# Patient Record
Sex: MALE | Race: WHITE | NOT HISPANIC OR LATINO | Employment: OTHER | URBAN - METROPOLITAN AREA
[De-identification: names, ages, dates, MRNs, and addresses within clinical notes are randomized per-mention and may not be internally consistent; named-entity substitution may affect disease eponyms.]

---

## 2017-02-15 ENCOUNTER — OFFICE VISIT (OUTPATIENT)
Dept: DERMATOLOGY | Facility: CLINIC | Age: 57
End: 2017-02-15

## 2017-02-15 DIAGNOSIS — L82.1 SK (SEBORRHEIC KERATOSIS): ICD-10-CM

## 2017-02-15 DIAGNOSIS — B35.3 TINEA PEDIS OF BOTH FEET: Primary | ICD-10-CM

## 2017-02-15 DIAGNOSIS — L30.9 DERMATITIS: ICD-10-CM

## 2017-02-15 RX ORDER — FLUOCINONIDE 0.5 MG/G
OINTMENT TOPICAL 2 TIMES DAILY
Qty: 60 G | Refills: 1 | Status: SHIPPED | OUTPATIENT
Start: 2017-02-15 | End: 2017-07-20

## 2017-02-15 RX ORDER — ECONAZOLE NITRATE 10 MG/G
CREAM TOPICAL DAILY
Qty: 85 G | Refills: 1 | Status: SHIPPED | OUTPATIENT
Start: 2017-02-15 | End: 2017-07-20

## 2017-02-15 ASSESSMENT — PAIN SCALES - GENERAL: PAINLEVEL: NO PAIN (0)

## 2017-02-15 NOTE — PATIENT INSTRUCTIONS
-Use Econazole on feet 1x daily for month - sides of feet, bottom, and between toes  -Get Anti-fungal spray for feet to prevent return of fungus  -Start Lidex ointment on hands 2x and self-titrate down as tolerated

## 2017-02-15 NOTE — NURSING NOTE
"Dermatology Rooming Note    Brice Trevino's goals for this visit include:   Chief Complaint   Patient presents with     Derm Problem     Eczema on hands and feet. He is currently using triamcinolone with slight improvement. Adan also has some \"bumps\" on his face of concern.     Nalini Carrion, CMA    "

## 2017-02-15 NOTE — MR AVS SNAPSHOT
After Visit Summary   2/15/2017    Brice Trevino    MRN: [id number]           Patient Information     Date Of Birth          1960        Visit Information        Provider Department      2/15/2017 7:45 AM Ryan Bañuelos MD [facility] Dermatology        Today's Diagnoses     Tinea pedis of both feet    -  1    Dermatitis           Follow-ups after your visit        Your next 10 appointments already scheduled     Mar 01, 2017  7:30 AM CST   (Arrive by 7:15 AM)   NEW COSMETIC with Ryan Bañuelos MD   [facility] Dermatology ([facility])    [address] [place] [address] [phone]              Who to contact     Please call your clinic at [phone] to:    Ask questions about your health    Make or cancel appointments    Discuss your medicines    Learn about your test results    Speak to your doctor   If you have compliments or concerns about an experience at your clinic, or if you wish to file a complaint, please contact [facility] Patient Relations at [phone] or email us at [email or web address]         Additional Information About Your Visit        MyChart Information     [employer] gives you secure access to your electronic health record. If you see a primary care provider, you can also send messages to your care team and make appointments. If you have questions, please call your primary care clinic.  If you do not have a primary care provider, please call [phone] and they will assist you.      [employer] is an electronic gateway that provides easy, online access to your medical records. With [employer], you can request a clinic appointment, read your test results, renew a prescription or communicate with your care team.     To access your existing account, please contact your [facility] or call [phone] for assistance.        Care EveryWhere ID     This is your  Care EveryWhere ID. This could be used by other organizations to access your [place] medical records  [id number]         Blood Pressure from Last 3 Encounters:   10/12/16 98/65   02/09/16 (!) 80/54   02/01/16 100/60    Weight from Last 3 Encounters:   10/12/16 96.5 kg (212 lb 12.8 oz)   02/09/16 90.7 kg (200 lb)   02/01/16 92 kg (202 lb 12.8 oz)              Today, you had the following     No orders found for display         Today's Medication Changes          These changes are accurate as of: 2/15/17  8:36 AM.  If you have any questions, ask your nurse or doctor.               Start taking these medicines.        Dose/Directions    econazole nitrate 1 % cream   Used for:  Tinea pedis of both feet   Started by:  Ryan Bañuelos MD        Apply topically daily For one month to the soles, sides of feet, and between toes.   Quantity:  85 g   Refills:  1       fluocinonide 0.05 % ointment   Commonly known as:  LIDEX   Used for:  Dermatitis   Started by:  Ryan Bañuelos MD        Apply topically 2 times daily   Quantity:  60 g   Refills:  1            Where to get your medicines      These medications were sent to [facility] [address] [place] [address] AT [address] [address]     Phone:  [phone]     econazole nitrate 1 % cream    fluocinonide 0.05 % ointment                Primary Care Provider Office Phone # Fax #    Leo Bhatia [phone]       [address]  [facility] [address]        Thank you!     Thank you for choosing [facility] DERMATOLOGY  for your care. Our goal is always to provide you with excellent care. Hearing back from our patients is one way we can continue to improve our services. Please take a few minutes to complete the written survey that you may receive in the mail after your visit with us. Thank you!             Your Updated Medication List - Protect others around  you: Learn how to safely use, store and throw away your medicines at www.disposemymeds.org.          This list is accurate as of: 2/15/17  8:36 AM.  Always use your most recent med list.                   Brand Name Dispense Instructions for use    ciclopirox 0.77 % Gel     45 g    Externally apply topically 2 times daily       econazole nitrate 1 % cream     85 g    Apply topically daily For one month to the soles, sides of feet, and between toes.       fluocinonide 0.05 % ointment    LIDEX    60 g    Apply topically 2 times daily       rizatriptan 5 MG tablet    MAXALT    18 tablet    Take 1-2 tablets (5-10 mg) by mouth at onset of headache for migraine May repeat in 2 hours. Max 6 tablets/24 hours.       triamcinolone 0.1 % cream    KENALOG    80 g    Apply sparingly to affected area three times daily as needed

## 2017-02-15 NOTE — LETTER
"2/15/2017       RE: Brice Trevino  [address]   [place] [address]     Dear Colleague,    Thank you for referring your patient, Brice Trevino, to the [facility] DERMATOLOGY at [facility]. Please see a copy of my visit note below.    [facility] Dermatology Note      Dermatology Problem List:  1. Tinea Pedis, both feet  2. Dyshidrotic eczema, right hand  3. Seborrheic keratosis  4. Onychomycosis    Encounter Date: Feb 15, 2017    CC:  Chief Complaint   Patient presents with     Derm Problem     Eczema on hands and feet. He is currently using triamcinolone with slight improvement. Adan also has some \"bumps\" on his face of concern.         History of Present Illness:  Mr. Brice Trevino is a 56 year old male who presents as a referral from PCP Dr. Pisano for ongoing dry skin, cracking, and itching of right hand. Has been using TAC cream for several years with mild benefit, would like to gain better control today. Also states his toes can become involved and are intermittently itchy and blister. Would also like to have 3 skin lesions removed from his face.    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    Family History:  There is no family history of skin cancer.    Medications:  Current Outpatient Prescriptions   Medication Sig Dispense Refill     fluocinonide (LIDEX) 0.05 % ointment Apply topically 2 times daily 60 g 1     econazole nitrate 1 % cream Apply topically daily For one month to the soles, sides of feet, and between " toes. 85 g 1     ciclopirox 0.77 % GEL Externally apply topically 2 times daily 45 g 1     triamcinolone (KENALOG) 0.1 % cream Apply sparingly to affected area three times daily as needed 80 g 2     rizatriptan (MAXALT) 5 MG tablet Take 1-2 tablets (5-10 mg) by mouth at onset of headache for migraine May repeat in 2 hours. Max 6 tablets/24 hours. 18 tablet 3     Allergies   Allergen Reactions     No Known Drug Allergies          Review of Systems:  -As per HPI  -Constitutional: The patient denies fatigue, fevers, chills, unintended weight loss, and night sweats.  -HEENT: Patient denies nonhealing oral sores.  -Skin: As above in HPI. No additional skin concerns.    Physical exam:  Vitals: There were no vitals taken for this visit.  GEN: This is a well developed, well-nourished male in no acute distress, in a pleasant mood.    SKIN: Focused examination of the face, hands, and feet was performed.  -white crusts/film between toes consistent with tinea  -hyperkeratosis of right palm with some fissuring, dry skin overlying knuckles of right hand  -seborrheic keratoses x3, forehead, under right eye, under left eye  -No other lesions of concern on areas examined.     Impression/Plan:  1. Tinea pedis:    KOH stain of skin shavings positive for fungus in feet, negative in hands    Econazole 1% cream once daily for 1 month    Recommend anti-fungal spray for future prevention    2. Dyshidrotic dermatitis, Right hand    Begin Lidex ointment BID and self-titrate down as tolerated      3. Seborrheic Keratosis:    Plan for return between 2-4 weeks for treatment likely with cryotherapy for 3+ lesions on face    Benign nature of lesions discussed with patient      CC Dr. Bhatia on close of this encounter.  Follow-up in 2 weeks, earlier for new or changing lesions.     Staff Involved:  Scribed by Dion Kaur, MS4 for Dr. Bañuelos.      Again, thank you for allowing me to participate in the care of your patient.      Sincerely,    Ryan ROTH  MD Sarmad

## 2017-02-15 NOTE — PROGRESS NOTES
"[facility] Dermatology Note      Dermatology Problem List:  1. Tinea Pedis, both feet  2. Dyshidrotic eczema, right hand  3. Seborrheic keratosis  4. Onychomycosis    Encounter Date: Feb 15, 2017    CC:  Chief Complaint   Patient presents with     Derm Problem     Eczema on hands and feet. He is currently using triamcinolone with slight improvement. Adan also has some \"bumps\" on his face of concern.         History of Present Illness:  Mr. Brice Trevino is a 56 year old male who presents as a referral from PCP Dr. Pisano for ongoing dry skin, cracking, and itching of right hand. Has been using TAC cream for several years with mild benefit, would like to gain better control today. Also states his toes can become involved and are intermittently itchy and blister. Would also like to have 3 skin lesions removed from his face.    Past Medical History:   Patient Active Problem List   Diagnosis     CARDIOVASCULAR SCREENING; LDL GOAL LESS THAN 160     Indigestion     Obesity     Migraine     Past Medical History   Diagnosis Date     Dermatitis      Past Surgical History   Procedure Laterality Date     Colonoscopy  4/19/2013     Procedure: COLONOSCOPY;  Colonoscopy;  Surgeon: Yovany Alcaraz MD;  Location: [facility]       Social History:  The patient works in [employer]. The patient denies use of tanning beds.    Family History:  There is no family history of skin cancer.    Medications:  Current Outpatient Prescriptions   Medication Sig Dispense Refill     fluocinonide (LIDEX) 0.05 % ointment Apply topically 2 times daily 60 g 1     econazole nitrate 1 % cream Apply topically daily For one month to the soles, sides of feet, and between toes. 85 g 1     ciclopirox 0.77 % GEL Externally apply topically 2 times daily 45 g 1     triamcinolone (KENALOG) 0.1 % cream Apply sparingly to affected area three times daily as needed 80 g 2     rizatriptan (MAXALT) 5 MG tablet Take 1-2 tablets (5-10 mg) by mouth at onset of " headache for migraine May repeat in 2 hours. Max 6 tablets/24 hours. 18 tablet 3     Allergies   Allergen Reactions     No Known Drug Allergies          Review of Systems:  -As per HPI  -Constitutional: The patient denies fatigue, fevers, chills, unintended weight loss, and night sweats.  -HEENT: Patient denies nonhealing oral sores.  -Skin: As above in HPI. No additional skin concerns.    Physical exam:  Vitals: There were no vitals taken for this visit.  GEN: This is a well developed, well-nourished male in no acute distress, in a pleasant mood.    SKIN: Focused examination of the face, hands, and feet was performed.  -white crusts/film between toes consistent with tinea  -hyperkeratosis of right palm with some fissuring, dry skin overlying knuckles of right hand  -seborrheic keratoses x3, forehead, under right eye, under left eye  -No other lesions of concern on areas examined.     Impression/Plan:  1. Tinea pedis:    KOH stain of skin shavings positive for fungus in feet, negative in hands    Econazole 1% cream once daily for 1 month    Recommend anti-fungal spray for future prevention    2. Dyshidrotic dermatitis, Right hand    Begin Lidex ointment BID and self-titrate down as tolerated      3. Seborrheic Keratosis:    Plan for return between 2-4 weeks for treatment likely with cryotherapy for 3+ lesions on face    Benign nature of lesions discussed with patient    Follow-up in 2 weeks, earlier for new or changing lesions.     Staff Involved:  Scribed by Dion Kaur MS4 for Dr. Bañuelos.      Staff attestation:  The documentation recorded by the scribe accurately reflects the services I personally performed and the decisions I personally made.    Ryan Bañuelos MD  Staff Dermatologist    Department of Dermatology

## 2017-03-01 ENCOUNTER — OFFICE VISIT (OUTPATIENT)
Dept: DERMATOLOGY | Facility: CLINIC | Age: 57
End: 2017-03-01

## 2017-03-01 DIAGNOSIS — L82.1 SK (SEBORRHEIC KERATOSIS): Primary | ICD-10-CM

## 2017-03-01 ASSESSMENT — PAIN SCALES - GENERAL
PAINLEVEL: NO PAIN (0)
PAINLEVEL: MILD PAIN (2)

## 2017-03-01 NOTE — MR AVS SNAPSHOT
After Visit Summary   3/1/2017    Brice Trevino    MRN: [id number]           Patient Information     Date Of Birth          1960        Visit Information        Provider Department      3/1/2017 7:30 AM Ryan Bañuelos MD [facility] Dermatology        Today's Diagnoses     SK (seborrheic keratosis)    -  1      Care Instructions    Cryotherapy    What is it?    Use of a very cold liquid, such as liquid nitrogen, to freeze and destroy abnormal skin cells that need to be removed    What should I expect?    Tenderness and redness    A small blister that might grow and fill with dark purple blood. There may be crusting.    More than one treatment may be needed if the lesions do not go away.    How do I care for the treated area?    Gently wash the area with your hands when bathing.    Use a thin layer of Vaseline to help with healing. You may use a Band-Aid.     The area should heal within 7-10 days and may leave behind a pink or lighter color.     Do not use an antibiotic or Neosporin ointment.     You may take acetaminophen (Tylenol) for pain.     Call your Doctor if you have:    Severe pain    Signs of infection (warmth, redness, cloudy yellow drainage, and or a bad smell)    Questions or concerns    Who should I call with questions?       [facility]: [phone]       [facility]: [phone]       For urgent needs outside of business hours call the [facility] at [phone]        and ask for the dermatology resident on call          Follow-ups after your visit        Follow-up notes from your care team     Return in about 1 year (around 3/1/2018).      Your next 10 appointments already scheduled     Apr 07, 2017  8:00 AM CDT   PHYSICAL with Leo Bhatia MD   [facility] ([facility])    [address] [place] [address] [phone]              Who to contact     Please  call your clinic at [phone] to:    Ask questions about your health    Make or cancel appointments    Discuss your medicines    Learn about your test results    Speak to your doctor   If you have compliments or concerns about an experience at your clinic, or if you wish to file a complaint, please contact [facility] Patient Relations at [phone] or email us at [email or web address]         Additional Information About Your Visit        MyChart Information     [employer] gives you secure access to your electronic health record. If you see a primary care provider, you can also send messages to your care team and make appointments. If you have questions, please call your primary care clinic.  If you do not have a primary care provider, please call [phone] and they will assist you.      [employer] is an electronic gateway that provides easy, online access to your medical records. With [employer], you can request a clinic appointment, read your test results, renew a prescription or communicate with your care team.     To access your existing account, please contact your [facility] or call [phone] for assistance.        Care EveryWhere ID     This is your Care EveryWhere ID. This could be used by other organizations to access your [place] medical records  [id number]         Blood Pressure from Last 3 Encounters:   10/12/16 98/65   02/09/16 (!) 80/54   02/01/16 100/60    Weight from Last 3 Encounters:   10/12/16 96.5 kg (212 lb 12.8 oz)   02/09/16 90.7 kg (200 lb)   02/01/16 92 kg (202 lb 12.8 oz)              We Performed the Following     DESTRUCT BENIGN LESION, UP TO 14        Primary Care Provider Office Phone # Fax #    Leo Bhatia [phone]       [place] [address]  [facility] [address]        Thank you!     Thank you for choosing [facility] DERMATOLOGY  for your care. Our goal is always to provide you  with excellent care. Hearing back from our patients is one way we can continue to improve our services. Please take a few minutes to complete the written survey that you may receive in the mail after your visit with us. Thank you!             Your Updated Medication List - Protect others around you: Learn how to safely use, store and throw away your medicines at www.disposemymeds.org.          This list is accurate as of: 3/1/17 11:59 PM.  Always use your most recent med list.                   Brand Name Dispense Instructions for use    ciclopirox 0.77 % Gel     45 g    Externally apply topically 2 times daily       econazole nitrate 1 % cream     85 g    Apply topically daily For one month to the soles, sides of feet, and between toes.       fluocinonide 0.05 % ointment    LIDEX    60 g    Apply topically 2 times daily       rizatriptan 5 MG tablet    MAXALT    18 tablet    Take 1-2 tablets (5-10 mg) by mouth at onset of headache for migraine May repeat in 2 hours. Max 6 tablets/24 hours.       triamcinolone 0.1 % cream    KENALOG    80 g    Apply sparingly to affected area three times daily as needed

## 2017-03-01 NOTE — PROGRESS NOTES
[facility] Dermatology Note        Dermatology Problem List:  1. Tinea Pedis, both feet  2. Dyshidrotic eczema, right hand  3. Seborrheic keratosis  4. Onychomycosis     Encounter Date: Feb 15, 2017     CC:       Chief Complaint   Patient presents with     Derm Problem       SK removal            History of Present Illness:  Mr. Brice Trevino is a 56 year old male who presents for removal of several SKs on the face. He has one on each lower eyelid, one on the right forehead, and two on the left cheek. They bother him cosmetically but not symptomatically. He got the lidex last weekend and has been using it with improvement of the skin of his hands.      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     Family History:  There is no family history of skin cancer.     Medications:   Current Outpatient Prescriptions           Current Outpatient Prescriptions   Medication Sig Dispense Refill     fluocinonide (LIDEX) 0.05 % ointment Apply topically 2 times daily 60 g 1     econazole nitrate 1 % cream Apply topically daily For one month to the soles, sides of feet, and between toes. 85 g 1     ciclopirox 0.77 % GEL Externally apply topically 2 times daily 45 g 1     triamcinolone (KENALOG) 0.1 % cream Apply sparingly to affected area three times daily as needed 80 g 2     rizatriptan (MAXALT) 5 MG tablet Take 1-2 tablets (5-10 mg) by mouth at onset of headache for migraine May repeat in 2 hours. Max 6 tablets/24  hours. 18 tablet 3              Allergies   Allergen Reactions     No Known Drug Allergies              Review of Systems:  -As per HPI  -Constitutional: The patient denies fatigue, fevers, chills, unintended weight loss, and night sweats.  -HEENT: Patient denies nonhealing oral sores.  -Skin: As above in HPI. No additional skin concerns.     Physical exam:  Vitals: There were no vitals taken for this visit.  GEN: This is a well developed, well-nourished male in no acute distress, in a pleasant mood.    SKIN: Focused examination of the face was performed.  -seborrheic keratoses x5, forehead, under right eye, under left eye, left cheek  -No other lesions of concern on areas examined.   -Right palm with a small fissure at the base of the palm     Impression/Plan:  1. Tinea pedis, per prior note:    KOH stain of skin shavings previously positive for fungus in feet, negative in hands    Econazole 1% cream once daily for 1 month    Recommend anti-fungal spray for future prevention     2. Dyshidrotic dermatitis, Right hand    Continue Lidex ointment BID and self-titrate down as tolerated        3. Seborrheic Keratosis:  Cryotherapy procedure note: After verbal consent and discussion of risks and benefits including but no limited to dyspigmentation/scar, blister, and pain, 5 was(were) treated with 1-2mm freeze border for 2 cycles with liquid nitrogen with forceps. Post cryotherapy instructions were provided.     Benign nature of lesions discussed with patient        CC Dr. Bhatia on close of this encounter.  Follow-up in 1 year, earlier for new or changing lesions.    Staff Physician Comments:   I saw and evaluated the patient with the resident and I agree with the assessment and plan.  I was present for the entire minor procedure and examination. SK treatments were cosmetic and payment was collected at time of visit.    Ryan Bañuelos MD  Dermatology Staff Physician  , Department of Dermatology

## 2017-03-01 NOTE — PATIENT INSTRUCTIONS
Cryotherapy    What is it?    Use of a very cold liquid, such as liquid nitrogen, to freeze and destroy abnormal skin cells that need to be removed    What should I expect?    Tenderness and redness    A small blister that might grow and fill with dark purple blood. There may be crusting.    More than one treatment may be needed if the lesions do not go away.    How do I care for the treated area?    Gently wash the area with your hands when bathing.    Use a thin layer of Vaseline to help with healing. You may use a Band-Aid.     The area should heal within 7-10 days and may leave behind a pink or lighter color.     Do not use an antibiotic or Neosporin ointment.     You may take acetaminophen (Tylenol) for pain.     Call your Doctor if you have:    Severe pain    Signs of infection (warmth, redness, cloudy yellow drainage, and or a bad smell)    Questions or concerns    Who should I call with questions?       [facility]: [phone]       [facility]: [phone]       For urgent needs outside of business hours call the [facility] at [phone]        and ask for the dermatology resident on call

## 2017-03-01 NOTE — NURSING NOTE
Dermatology Rooming Note    Brice Trevino's goals for this visit include:   Chief Complaint   Patient presents with     Derm Problem     SK removal     Nalini Carrion, CMA

## 2017-03-01 NOTE — PROGRESS NOTES
[facility] Dermatology Note      Dermatology Problem List:  1. Tinea Pedis, both feet  2. Dyshidrotic eczema, right hand  3. Seborrheic keratosis  4. Onychomycosis    Encounter Date: Mar 1, 2017    CC:  Chief Complaint   Patient presents with     Derm Problem     SK removal         History of Present Illness:  Mr. Brice Trevino is a 56 year old male who presents for follow-up to treat multiple seborrheic keratoses. He was last seen on 2/15/17 for the above problems. ***       Past Medical History:   Patient Active Problem List   Diagnosis     CARDIOVASCULAR SCREENING; LDL GOAL LESS THAN 160     Indigestion     Obesity     Migraine     Past Medical History   Diagnosis Date     Dermatitis      Past Surgical History   Procedure Laterality Date     Colonoscopy  4/19/2013     Procedure: COLONOSCOPY;  Colonoscopy;  Surgeon: Yovany Alcaraz MD;  Location: [facility]       Social History:  The patient works in [employer]. The patient denies use of tanning beds.    Family History:  There is no family history of skin cancer.    Medications:  Current Outpatient Prescriptions   Medication Sig Dispense Refill     fluocinonide (LIDEX) 0.05 % ointment Apply topically 2 times daily 60 g 1     econazole nitrate 1 % cream Apply topically daily For one month to the soles, sides of feet, and between toes. 85 g 1     rizatriptan (MAXALT) 5 MG tablet Take 1-2 tablets (5-10 mg) by mouth at onset of headache for migraine May repeat in 2 hours. Max 6 tablets/24 hours. 18 tablet 3     ciclopirox 0.77 % GEL Externally apply topically 2 times daily 45 g 1     triamcinolone (KENALOG) 0.1 % cream Apply sparingly to affected area three times daily as needed 80 g 2     Allergies   Allergen Reactions     No Known Drug Allergies          Review of Systems:  -As per HPI  -Skin: As above in HPI. No additional skin concerns.    Physical exam:  Vitals: There were no vitals taken for this visit.  GEN: This is a well developed, well-nourished  male in no acute distress, in a pleasant mood.    SKIN: Focused examination of the face, hands, and feet was performed.  -white crusts/film between toes consistent with tinea  -hyperkeratosis of right palm with some fissuring, dry skin overlying knuckles of right hand  -seborrheic keratoses x3, forehead, under right eye, under left eye***  -No other lesions of concern on areas examined.     Impression/Plan:  1. Seborrheic keratosis of the ***  Cryotherapy procedure note: After verbal consent and discussion of risks and benefits including but no limited to dyspigmentation/scar, blister, and pain, ***was(were) treated with 1-2mm freeze border for 2 cycles with liquid nitrogen. Post cryotherapy instructions were provided.     2. Tinea pedis with positive KOH of feet on 2/15, negative in hands - not addressed today    Econazole 1% cream once daily for 1 month total    Recommend anti-fungal spray for future prevention    3. Dyshidrotic dermatitis, Right hand - not addressed today    Begin Lidex ointment BID and self-titrate down as tolerated      CC Dr. Bahtia on close of this encounter.  Follow-up in 2 weeks, earlier for new or changing lesions.     Staff Involved:  ***      Ryan Bañuelos MD  Dermatology Staff    [facility] Department of Dermatology

## 2017-03-13 ENCOUNTER — MYC MEDICAL ADVICE (OUTPATIENT)
Dept: DERMATOLOGY | Facility: CLINIC | Age: 57
End: 2017-03-13

## 2017-03-22 ENCOUNTER — OFFICE VISIT (OUTPATIENT)
Dept: DERMATOLOGY | Facility: CLINIC | Age: 57
End: 2017-03-22

## 2017-03-22 DIAGNOSIS — L82.1 SK (SEBORRHEIC KERATOSIS): Primary | ICD-10-CM

## 2017-03-22 ASSESSMENT — PAIN SCALES - GENERAL: PAINLEVEL: NO PAIN (0)

## 2017-03-22 NOTE — PATIENT INSTRUCTIONS
Cryotherapy    What is it?    Use of a very cold liquid, such as liquid nitrogen, to freeze and destroy abnormal skin cells that need to be removed    What should I expect?    Tenderness and redness    A small blister that might grow and fill with dark purple blood. There may be crusting.    More than one treatment may be needed if the lesions do not go away.    How do I care for the treated area?    Gently wash the area with your hands when bathing.    Use a thin layer of Vaseline to help with healing. You may use a Band-Aid.     The area should heal within 7-10 days and may leave behind a pink or lighter color.     Do not use an antibiotic or Neosporin ointment.     You may take acetaminophen (Tylenol) for pain.     Call your Doctor if you have:    Severe pain    Signs of infection (warmth, redness, cloudy yellow drainage, and or a bad smell)    Questions or concerns    Who should I call with questions?       [facility]: [phone]       [facility]: [phone]       For urgent needs outside of business hours call the [facility] at [phone]        and ask for the dermatology resident on call

## 2017-03-22 NOTE — NURSING NOTE
Dermatology Rooming Note    Brice Trevino's goals for this visit include:   Chief Complaint   Patient presents with     Derm Problem     chuck is here today to have some LN2 done     Kate Donohue MA

## 2017-03-22 NOTE — LETTER
3/22/2017       RE: Brice Trevino  [address]   [place] [address]     Dear Colleague,    Thank you for referring your patient, Brice Trevino, to the [facility] DERMATOLOGY at [facility]. Please see a copy of my visit note below.    HPI:  Mr Trevino is here for a persistent seborrheic keratoses on the eyelids. He is here today for another treatment of cryotherapy for the persistent lesions. He states that the other lesions have healed nicely after cryotherapy. The patient reports no other lesions of concern at this time.    Exam: Focused examination of the face was performed.   - Persistent stuck on tan to brown papules on the inferior eyelids bilaterally.   - Resolution other treated papule with postinflammatory pink macules    Impression/Plan  1. Persistent seborrheic keratoses - inferior eyelids bilaterally.   - Cryotherapy procedure note: After verbal consent and discussion of risks and benefits including but no limited to dyspigmentation/scar, blister, and pain, 2 was(were) treated with 1-2mm freeze border for 2 cycles with liquid nitrogen. Post cryotherapy instructions were provided.     Follow up 1 year.     Staff Involved:  Scribe Disclosure:   I, Edgar Katz, am serving as a scribe to document services personally performed by Dr. Ryan Bañuelos, based on data collection and the provider's statements to me.     Staff attestation:  The documentation recorded by the scribe accurately reflects the services I personally performed and the decisions I personally made.    Ryan Bañuelos MD  Staff Dermatologist    Department of Dermatology    Visit performed free of charge (touch-up).

## 2017-03-22 NOTE — MR AVS SNAPSHOT
After Visit Summary   3/22/2017    Brice Trevino    MRN: [id number]           Patient Information     Date Of Birth          1960        Visit Information        Provider Department      3/22/2017 2:00 PM Ryan Bañuelos MD [facility] Dermatology        Today's Diagnoses     SK (seborrheic keratosis)    -  1      Care Instructions    Cryotherapy    What is it?    Use of a very cold liquid, such as liquid nitrogen, to freeze and destroy abnormal skin cells that need to be removed    What should I expect?    Tenderness and redness    A small blister that might grow and fill with dark purple blood. There may be crusting.    More than one treatment may be needed if the lesions do not go away.    How do I care for the treated area?    Gently wash the area with your hands when bathing.    Use a thin layer of Vaseline to help with healing. You may use a Band-Aid.     The area should heal within 7-10 days and may leave behind a pink or lighter color.     Do not use an antibiotic or Neosporin ointment.     You may take acetaminophen (Tylenol) for pain.     Call your Doctor if you have:    Severe pain    Signs of infection (warmth, redness, cloudy yellow drainage, and or a bad smell)    Questions or concerns    Who should I call with questions?       [facility]: [phone]       [facility]: [phone]       For urgent needs outside of business hours call the [facility] at [phone]        and ask for the dermatology resident on call          Follow-ups after your visit        Follow-up notes from your care team     Return in about 1 year (around 3/22/2018).      Who to contact     Please call your clinic at [phone] to:    Ask questions about your health    Make or cancel appointments    Discuss your medicines    Learn about your test results    Speak to your doctor   If you have compliments or concerns about an experience at  your clinic, or if you wish to file a complaint, please contact [facility] Patient Relations at [phone] or email us at [email or web address]         Additional Information About Your Visit        MyChart Information     [employer] gives you secure access to your electronic health record. If you see a primary care provider, you can also send messages to your care team and make appointments. If you have questions, please call your primary care clinic.  If you do not have a primary care provider, please call [phone] and they will assist you.      [employer] is an electronic gateway that provides easy, online access to your medical records. With [employer], you can request a clinic appointment, read your test results, renew a prescription or communicate with your care team.     To access your existing account, please contact your [facility] or call [phone] for assistance.        Care EveryWhere ID     This is your Care EveryWhere ID. This could be used by other organizations to access your [facility] medical records  [id number]         Blood Pressure from Last 3 Encounters:   04/07/17 97/62   10/12/16 98/65   02/09/16 (!) 80/54    Weight from Last 3 Encounters:   04/07/17 89.8 kg (198 lb)   10/12/16 96.5 kg (212 lb 12.8 oz)   02/09/16 90.7 kg (200 lb)              Today, you had the following     No orders found for display       Primary Care Provider Office Phone # Fax #    Leo Bhatia [phone]       [address]  [facility] [address]        Thank you!     Thank you for choosing [facility] DERMATOLOGY  for your care. Our goal is always to provide you with excellent care. Hearing back from our patients is one way we can continue to improve our services. Please take a few minutes to complete the written survey that you may receive in the mail after your visit with us. Thank you!             Your Updated  Medication List - Protect others around you: Learn how to safely use, store and throw away your medicines at www.disposemymeds.org.          This list is accurate as of: 3/22/17 11:59 PM.  Always use your most recent med list.                   Brand Name Dispense Instructions for use    ciclopirox 0.77 % Gel     45 g    Externally apply topically 2 times daily       econazole nitrate 1 % cream     85 g    Apply topically daily For one month to the soles, sides of feet, and between toes.       fluocinonide 0.05 % ointment    LIDEX    60 g    Apply topically 2 times daily       rizatriptan 5 MG tablet    MAXALT    18 tablet    Take 1-2 tablets (5-10 mg) by mouth at onset of headache for migraine May repeat in 2 hours. Max 6 tablets/24 hours.       triamcinolone 0.1 % cream    KENALOG    80 g    Apply sparingly to affected area three times daily as needed

## 2017-03-22 NOTE — PROGRESS NOTES
HPI:  Mr Trevino is here for a persistent seborrheic keratoses on the eyelids. He is here today for another treatment of cryotherapy for the persistent lesions. He states that the other lesions have healed nicely after cryotherapy. The patient reports no other lesions of concern at this time.    Exam: Focused examination of the face was performed.   - Persistent stuck on tan to brown papules on the inferior eyelids bilaterally.   - Resolution other treated papule with postinflammatory pink macules    Impression/Plan  1. Persistent seborrheic keratoses - inferior eyelids bilaterally.   - Cryotherapy procedure note: After verbal consent and discussion of risks and benefits including but no limited to dyspigmentation/scar, blister, and pain, 2 was(were) treated with 1-2mm freeze border for 2 cycles with liquid nitrogen. Post cryotherapy instructions were provided.     Follow up 1 year.     Staff Involved:  Scribe Disclosure:   I, Edgar Katz, am serving as a scribe to document services personally performed by Dr. Ryan Bañuelos, based on data collection and the provider's statements to me.     Staff attestation:  The documentation recorded by the scribe accurately reflects the services I personally performed and the decisions I personally made.    Ryan Bañuelos MD  Staff Dermatologist    Department of Dermatology    Visit performed free of charge (touch-up).

## 2017-03-24 ENCOUNTER — TRANSFERRED RECORDS (OUTPATIENT)
Dept: HEALTH INFORMATION MANAGEMENT | Facility: CLINIC | Age: 57
End: 2017-03-24

## 2017-04-07 ENCOUNTER — OFFICE VISIT (OUTPATIENT)
Dept: FAMILY MEDICINE | Facility: CLINIC | Age: 57
End: 2017-04-07
Payer: COMMERCIAL

## 2017-04-07 VITALS
TEMPERATURE: 98.8 F | HEART RATE: 84 BPM | HEIGHT: 68 IN | WEIGHT: 198 LBS | DIASTOLIC BLOOD PRESSURE: 62 MMHG | OXYGEN SATURATION: 99 % | BODY MASS INDEX: 30.01 KG/M2 | RESPIRATION RATE: 16 BRPM | SYSTOLIC BLOOD PRESSURE: 97 MMHG

## 2017-04-07 DIAGNOSIS — Z00.01 ENCOUNTER FOR ROUTINE ADULT MEDICAL EXAM WITH ABNORMAL FINDINGS: Primary | ICD-10-CM

## 2017-04-07 DIAGNOSIS — Z13.220 LIPID SCREENING: ICD-10-CM

## 2017-04-07 DIAGNOSIS — R73.9 ELEVATED BLOOD SUGAR: ICD-10-CM

## 2017-04-07 LAB
CHOLEST SERPL-MCNC: 202 MG/DL
HBA1C MFR BLD: 6.2 % (ref 4.3–6)
HDLC SERPL-MCNC: 42 MG/DL
LDLC SERPL CALC-MCNC: 128 MG/DL
NONHDLC SERPL-MCNC: 160 MG/DL
TRIGL SERPL-MCNC: 159 MG/DL

## 2017-04-07 PROCEDURE — 83036 HEMOGLOBIN GLYCOSYLATED A1C: CPT | Performed by: FAMILY MEDICINE

## 2017-04-07 PROCEDURE — 99396 PREV VISIT EST AGE 40-64: CPT | Performed by: FAMILY MEDICINE

## 2017-04-07 PROCEDURE — 36415 COLL VENOUS BLD VENIPUNCTURE: CPT | Performed by: FAMILY MEDICINE

## 2017-04-07 PROCEDURE — 80061 LIPID PANEL: CPT | Performed by: FAMILY MEDICINE
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
## 2017-04-07 NOTE — MR AVS SNAPSHOT
After Visit Summary   4/7/2017    Brice Trevino    MRN: [id number]           Patient Information     Date Of Birth          1960        Visit Information        Provider Department      4/7/2017 8:00 AM Leo Bhatia MD [facility]        Today's Diagnoses     Encounter for routine adult medical exam with abnormal findings    -  1    Elevated blood sugar        Lipid screening          Care Instructions      Preventive Health Recommendations  Male Ages 50 - 64    Yearly exam:             See your health care provider every year in order to  o   Review health changes.   o   Discuss preventive care.    o   Review your medicines if your doctor has prescribed any.     Have a cholesterol test every 5 years, or more frequently if you are at risk for high cholesterol/heart disease.     Have a diabetes test (fasting glucose) every three years. If you are at risk for diabetes, you should have this test more often.     Have a colonoscopy at age 50, or have a yearly FIT test (stool test). These exams will check for colon cancer.      Talk with your health care provider about whether or not a prostate cancer screening test (PSA) is right for you.    You should be tested each year for STDs (sexually transmitted diseases), if you re at risk.     Shots: Get a flu shot each year. Get a tetanus shot every 10 years.     Nutrition:    Eat at least 5 servings of fruits and vegetables daily.     Eat whole-grain bread, whole-wheat pasta and brown rice instead of white grains and rice.     Talk to your provider about Calcium and Vitamin D.     Lifestyle    Exercise for at least 150 minutes a week (30 minutes a day, 5 days a week). This will help you control your weight and prevent disease.     Limit alcohol to one drink per day.     No smoking.     Wear sunscreen to prevent skin cancer.     See your dentist every six months for an exam and cleaning.     See your eye doctor every 1 to 2 years.        "   Follow-ups after your visit        Who to contact     If you have questions or need follow up information about today's clinic visit or your schedule please contact [facility] directly at [phone].  Normal or non-critical lab and imaging results will be communicated to you by MyChart, letter or phone within 4 business days after the clinic has received the results. If you do not hear from us within 7 days, please contact the clinic through MyChart or phone. If you have a critical or abnormal lab result, we will notify you by phone as soon as possible.  Submit refill requests through [employer] or call your pharmacy and they will forward the refill request to us. Please allow 3 business days for your refill to be completed.          Additional Information About Your Visit        [email or web address] Information     [employer] gives you secure access to your electronic health record. If you see a primary care provider, you can also send messages to your care team and make appointments. If you have questions, please call your primary care clinic.  If you do not have a primary care provider, please call [phone] and they will assist you.        Care EveryWhere ID     This is your Care EveryWhere ID. This could be used by other organizations to access your [facility] medical records  [id number]        Your Vitals Were     Pulse Temperature Respirations Height Pulse Oximetry BMI (Body Mass Index)    84 98.8  F (37.1  C) (Oral) 16 5' 8\" (1.727 m) 99% 30.11 kg/m2       Blood Pressure from Last 3 Encounters:   04/07/17 (!) 64/57   10/12/16 98/65   02/09/16 (!) 80/54    Weight from Last 3 Encounters:   04/07/17 198 lb (89.8 kg)   10/12/16 212 lb 12.8 oz (96.5 kg)   02/09/16 200 lb (90.7 kg)              We Performed the Following     Hemoglobin A1c     Lipid panel reflex to direct LDL        Primary Care Provider Office Phone # Fax #    Leo Bhatia [phone]       [facility] " [address]  [facility] [address]        Thank you!     Thank you for choosing [facility]  for your care. Our goal is always to provide you with excellent care. Hearing back from our patients is one way we can continue to improve our services. Please take a few minutes to complete the written survey that you may receive in the mail after your visit with us. Thank you!             Your Updated Medication List - Protect others around you: Learn how to safely use, store and throw away your medicines at www.disposemymeds.org.          This list is accurate as of: 4/7/17  8:42 AM.  Always use your most recent med list.                   Brand Name Dispense Instructions for use    ciclopirox 0.77 % Gel     45 g    Externally apply topically 2 times daily       econazole nitrate 1 % cream     85 g    Apply topically daily For one month to the soles, sides of feet, and between toes.       fluocinonide 0.05 % ointment    LIDEX    60 g    Apply topically 2 times daily       rizatriptan 5 MG tablet    MAXALT    18 tablet    Take 1-2 tablets (5-10 mg) by mouth at onset of headache for migraine May repeat in 2 hours. Max 6 tablets/24 hours.       triamcinolone 0.1 % cream    KENALOG    80 g    Apply sparingly to affected area three times daily as needed

## 2017-04-07 NOTE — NURSING NOTE
"Chief Complaint   Patient presents with     Physical       Initial BP (!) 64/57 (BP Location: Right arm, Patient Position: Chair, Cuff Size: Adult Large)  Pulse 84  Temp 98.8  F (37.1  C) (Oral)  Resp 16  Ht 5' 8\" (1.727 m)  Wt 198 lb (89.8 kg)  SpO2 99%  BMI 30.11 kg/m2 Estimated body mass index is 30.11 kg/(m^2) as calculated from the following:    Height as of this encounter: 5' 8\" (1.727 m).    Weight as of this encounter: 198 lb (89.8 kg).  Medication Reconciliation: complete     Sara Sanchez CMA      "

## 2017-07-20 DIAGNOSIS — B35.3 TINEA PEDIS OF BOTH FEET: ICD-10-CM

## 2017-07-20 DIAGNOSIS — L30.9 DERMATITIS: ICD-10-CM

## 2017-07-21 RX ORDER — FLUOCINONIDE 0.5 MG/G
OINTMENT TOPICAL 2 TIMES DAILY
Qty: 60 G | Refills: 1 | Status: SHIPPED | OUTPATIENT
Start: 2017-07-21 | End: 2018-05-04

## 2017-07-21 RX ORDER — ECONAZOLE NITRATE 10 MG/G
CREAM TOPICAL DAILY
Qty: 85 G | Refills: 1 | Status: SHIPPED | OUTPATIENT
Start: 2017-07-21 | End: 2018-05-04

## 2017-07-21 NOTE — TELEPHONE ENCOUNTER
Refill request received for lidex ointment and econazole 1%. Last note from 3/17 with Dr. Bañuelos reviewed. Initially planned for 1 month of econazole, though if recurrent, additional treatment topically seems reasonable. Will prescribe both as requested.     Prasanth Thomas MD  PGY3 Dermatology  [phone]

## 2017-11-07 ENCOUNTER — OFFICE VISIT (OUTPATIENT)
Dept: FAMILY MEDICINE | Facility: CLINIC | Age: 57
End: 2017-11-07
Payer: COMMERCIAL

## 2017-11-07 VITALS
RESPIRATION RATE: 14 BRPM | SYSTOLIC BLOOD PRESSURE: 102 MMHG | WEIGHT: 199.4 LBS | TEMPERATURE: 98.9 F | DIASTOLIC BLOOD PRESSURE: 66 MMHG | HEART RATE: 80 BPM | BODY MASS INDEX: 30.32 KG/M2 | OXYGEN SATURATION: 95 %

## 2017-11-07 DIAGNOSIS — Z23 NEED FOR PROPHYLACTIC VACCINATION AND INOCULATION AGAINST INFLUENZA: ICD-10-CM

## 2017-11-07 DIAGNOSIS — J20.9 ACUTE BRONCHITIS WITH SYMPTOMS > 10 DAYS: Primary | ICD-10-CM

## 2017-11-07 PROCEDURE — 90471 IMMUNIZATION ADMIN: CPT | Performed by: FAMILY MEDICINE

## 2017-11-07 PROCEDURE — 90686 IIV4 VACC NO PRSV 0.5 ML IM: CPT | Performed by: FAMILY MEDICINE

## 2017-11-07 PROCEDURE — 99213 OFFICE O/P EST LOW 20 MIN: CPT | Mod: 25 | Performed by: FAMILY MEDICINE

## 2017-11-07 RX ORDER — AZITHROMYCIN 250 MG/1
TABLET, FILM COATED ORAL
Qty: 6 TABLET | Refills: 0 | Status: SHIPPED | OUTPATIENT
Start: 2017-11-07 | End: 2018-05-04

## 2017-11-07 RX ORDER — CODEINE PHOSPHATE/GUAIFENESIN 10-100MG/5
5 LIQUID (ML) ORAL EVERY 4 HOURS PRN
COMMUNITY
Start: 2017-03-24 | End: 2018-05-04

## 2017-11-07 NOTE — PROGRESS NOTES
SUBJECTIVE:   Brice Trevino is a 57 year old male who presents to clinic today for the following health issues:    RESPIRATORY SYMPTOMS      Duration: one week    Description  cough and fever    Severity: moderate    Accompanying signs and symptoms: Fever and cough    History (predisposing factors):  none    Precipitating or alleviating factors: None    Therapies tried and outcome:  Guaifenesin for cough.    Worsening symptoms in past 7-10 days.  Pharmacist recommended he be seen today with worsening cough and fever last week.  No fever today.  No known sick contacts.  Unable to take deep breath without cough.  Productive cough- yellow sputum.  Nonsmoker.    Problem list and histories reviewed & adjusted, as indicated.  Additional history: as documented    Patient Active Problem List   Diagnosis     Indigestion     Obesity     Migraine     Past Surgical History:   Procedure Laterality Date     COLONOSCOPY  4/19/2013    Procedure: COLONOSCOPY;  Colonoscopy;  Surgeon: Yovany Alcaraz MD;  Location:  GI       Social History   Substance Use Topics     Smoking status: Never Smoker     Smokeless tobacco: Never Used     Alcohol use No     Family History   Problem Relation Age of Onset     DIABETES Father      type 2     GASTROINTESTINAL DISEASE Mother      reflux     Eye Disorder Maternal Grandfather      cateracts     HEART DISEASE Paternal Grandfather      heart attack age 60's     Melanoma No family hx of      Skin Cancer No family hx of          Current Outpatient Prescriptions   Medication Sig Dispense Refill     guaiFENesin-codeine (GUAIFENESIN AC) 100-10 MG/5ML SYRP syrup Take 5 mLs by mouth every 4 hours as needed       azithromycin (ZITHROMAX) 250 MG tablet Two tablets first day, then one tablet daily for four days. 6 tablet 0     econazole nitrate 1 % cream Apply topically daily For one month to the soles, sides of feet, and between toes. (Patient not taking: Reported on 11/7/2017) 85 g 1     fluocinonide  (LIDEX) 0.05 % ointment Apply topically 2 times daily (Patient not taking: Reported on 11/7/2017) 60 g 1     rizatriptan (MAXALT) 5 MG tablet Take 1-2 tablets (5-10 mg) by mouth at onset of headache for migraine May repeat in 2 hours. Max 6 tablets/24 hours. (Patient not taking: Reported on 11/7/2017) 18 tablet 3     ciclopirox 0.77 % GEL Externally apply topically 2 times daily (Patient not taking: Reported on 11/7/2017) 45 g 1     triamcinolone (KENALOG) 0.1 % cream Apply sparingly to affected area three times daily as needed (Patient not taking: Reported on 11/7/2017) 80 g 2     Allergies   Allergen Reactions     No Known Drug Allergies      BP Readings from Last 3 Encounters:   11/07/17 102/66   04/07/17 97/62   10/12/16 98/65    Wt Readings from Last 3 Encounters:   11/07/17 199 lb 6.4 oz (90.4 kg)   04/07/17 198 lb (89.8 kg)   10/12/16 212 lb 12.8 oz (96.5 kg)        Social History   Nonsmoker.  Works [employer].    Reviewed and updated as needed this visit by clinical staff     Reviewed and updated as needed this visit by Provider         ROS:  Constitutional, HEENT, cardiovascular, pulmonary, gi and gu systems are negative, except as otherwise noted.      OBJECTIVE:   /66 (BP Location: Right arm, Patient Position: Sitting, Cuff Size: Adult Regular)  Pulse 80  Temp 98.9  F (37.2  C) (Oral)  Resp 14  Wt 199 lb 6.4 oz (90.4 kg)  SpO2 95%  BMI 30.32 kg/m2  Body mass index is 30.32 kg/(m^2).     GENERAL: healthy, alert and no distress  NECK: no adenopathy, no asymmetry, masses, or scars and thyroid normal to palpation  RESP: lungs clear to auscultation - no rales, rhonchi or wheezes.  Unable to take deep breath in without cough.  Harsh, productive cough on exam.  CV: regular rate and rhythm, normal S1 S2, no S3 or S4, no murmur, click or rub, no peripheral edema and peripheral pulses strong  ABDOMEN: soft, nontender, no hepatosplenomegaly, no masses and bowel sounds normal  MS: no gross musculoskeletal  defects noted, no edema  SKIN: no suspicious lesions or rashes  PSYCH: mentation appears normal, affect normal/bright    Diagnostic Test Results:  none     ASSESSMENT/PLAN:       1. Acute bronchitis with symptoms > 10 days    Prescribed azithromycin (ZITHROMAX) 250 MG tablet; Two tablets first day, then one tablet daily for four days.  Dispense: 6 tablet; Refill: 0    FU if no change or worsening symptoms prn.    2. Need for prophylactic vaccination and inoculation against influenza    - HC FLU VAC PRESRV FREE QUAD SPLIT VIR 3+YRS IM  [12110]  -      ADMIN VACCINE, FIRST [41501]    Flu vaccine today.    Keyona Whiting MD  [facility]  Injectable Influenza Immunization Documentation    1.  Is the person to be vaccinated sick today?   No    2. Does the person to be vaccinated have an allergy to a component   of the vaccine?   No  Egg Allergy Algorithm Link    3. Has the person to be vaccinated ever had a serious reaction   to influenza vaccine in the past?   No    4. Has the person to be vaccinated ever had Guillain-Barré syndrome?   No    Form completed by Sarah Longoria MA

## 2017-11-07 NOTE — MR AVS SNAPSHOT
After Visit Summary   11/7/2017    Brice Trevino    MRN: [id number]           Patient Information     Date Of Birth          1960        Visit Information        Provider Department      11/7/2017 8:00 AM Keyona Whiting MD [facility]        Today's Diagnoses     Acute bronchitis with symptoms > 10 days    -  1    Need for prophylactic vaccination and inoculation against influenza           Follow-ups after your visit        Follow-up notes from your care team     Return if symptoms worsen or fail to improve.      Who to contact     If you have questions or need follow up information about today's clinic visit or your schedule please contact [facility] directly at [phone].  Normal or non-critical lab and imaging results will be communicated to you by Edventoryhart, letter or phone within 4 business days after the clinic has received the results. If you do not hear from us within 7 days, please contact the clinic through Edventoryhart or phone. If you have a critical or abnormal lab result, we will notify you by phone as soon as possible.  Submit refill requests through Crosswise or call your pharmacy and they will forward the refill request to us. Please allow 3 business days for your refill to be completed.          Additional Information About Your Visit        MyChart Information     Crosswise gives you secure access to your electronic health record. If you see a primary care provider, you can also send messages to your care team and make appointments. If you have questions, please call your primary care clinic.  If you do not have a primary care provider, please call [phone] and they will assist you.        Care EveryWhere ID     This is your Care EveryWhere ID. This could be used by other organizations to access your [facility] medical records  [id number]        Your Vitals Were     Pulse Temperature Respirations Pulse Oximetry BMI (Body  Mass Index)       80 98.9  F (37.2  C) (Oral) 14 95% 30.32 kg/m2        Blood Pressure from Last 3 Encounters:   11/07/17 102/66   04/07/17 97/62   10/12/16 98/65    Weight from Last 3 Encounters:   11/07/17 199 lb 6.4 oz (90.4 kg)   04/07/17 198 lb (89.8 kg)   10/12/16 212 lb 12.8 oz (96.5 kg)              We Performed the Following          ADMIN VACCINE, FIRST [96866]     HC FLU VAC PRESRV FREE QUAD SPLIT VIR 3+YRS IM  [30107]          Today's Medication Changes          These changes are accurate as of: 11/7/17  8:49 AM.  If you have any questions, ask your nurse or doctor.               Start taking these medicines.        Dose/Directions    azithromycin 250 MG tablet   Commonly known as:  ZITHROMAX   Used for:  Acute bronchitis with symptoms > 10 days   Started by:  Keyona Whiting MD        Two tablets first day, then one tablet daily for four days.   Quantity:  6 tablet   Refills:  0            Where to get your medicines      These medications were sent to [facility] - [place], [place] - [address]  [address], [place] [address]     Phone:  [phone]     azithromycin 250 MG tablet                Primary Care Provider Office Phone # Fax #    Leo Grady Bhatia [phone]       [address]  [facility] [address]        Equal Access to Services     KATHERINE JOHNSON AH: Hadii aad ku hadasho Soomaali, waaxda luqadaha, qaybta kaalmada adeegyada, waxay marika treadwell adestanley miller. So [facility] [phone].    ATENCIÓN: Si habla español, tiene a lazo disposición servicios gratuitos de asistencia lingüística. Llame al [phone].    We comply with applicable federal civil rights laws and [place] laws. We do not discriminate on the basis of race, color, national origin, age, disability, sex, sexual orientation, or gender identity.            Thank you!     Thank you for choosing [facility]  for your care. Our goal is always to  provide you with excellent care. Hearing back from our patients is one way we can continue to improve our services. Please take a few minutes to complete the written survey that you may receive in the mail after your visit with us. Thank you!             Your Updated Medication List - Protect others around you: Learn how to safely use, store and throw away your medicines at www.disposemymeds.org.          This list is accurate as of: 11/7/17  8:49 AM.  Always use your most recent med list.                   Brand Name Dispense Instructions for use Diagnosis    azithromycin 250 MG tablet    ZITHROMAX    6 tablet    Two tablets first day, then one tablet daily for four days.    Acute bronchitis with symptoms > 10 days       ciclopirox 0.77 % Gel     45 g    Externally apply topically 2 times daily    Onychomycosis       econazole nitrate 1 % cream     85 g    Apply topically daily For one month to the soles, sides of feet, and between toes.    Tinea pedis of both feet       fluocinonide 0.05 % ointment    LIDEX    60 g    Apply topically 2 times daily    Dermatitis       guaiFENesin-codeine 100-10 MG/5ML Syrp syrup    guaiFENesin AC     Take 5 mLs by mouth every 4 hours as needed        rizatriptan 5 MG tablet    MAXALT    18 tablet    Take 1-2 tablets (5-10 mg) by mouth at onset of headache for migraine May repeat in 2 hours. Max 6 tablets/24 hours.    Migraine with aura and without status migrainosus, not intractable       triamcinolone 0.1 % cream    KENALOG    80 g    Apply sparingly to affected area three times daily as needed    Eczema, unspecified type

## 2017-11-07 NOTE — NURSING NOTE
"Chief Complaint   Patient presents with     URI     cough and congested        Initial /66 (BP Location: Right arm, Patient Position: Sitting, Cuff Size: Adult Regular)  Pulse 80  Temp 98.9  F (37.2  C) (Oral)  Resp 14  Wt 199 lb 6.4 oz (90.4 kg)  SpO2 95%  BMI 30.32 kg/m2 Estimated body mass index is 30.32 kg/(m^2) as calculated from the following:    Height as of 4/7/17: 5' 8\" (1.727 m).    Weight as of this encounter: 199 lb 6.4 oz (90.4 kg).  Medication Reconciliation: complete     Sarah Longoria MA      "

## 2018-05-04 ENCOUNTER — OFFICE VISIT (OUTPATIENT)
Dept: FAMILY MEDICINE | Facility: CLINIC | Age: 58
End: 2018-05-04
Payer: COMMERCIAL

## 2018-05-04 VITALS
HEART RATE: 71 BPM | RESPIRATION RATE: 16 BRPM | HEIGHT: 68 IN | SYSTOLIC BLOOD PRESSURE: 101 MMHG | DIASTOLIC BLOOD PRESSURE: 68 MMHG | TEMPERATURE: 97.9 F | WEIGHT: 200.6 LBS | BODY MASS INDEX: 30.4 KG/M2 | OXYGEN SATURATION: 96 %

## 2018-05-04 DIAGNOSIS — L30.9 DERMATITIS: ICD-10-CM

## 2018-05-04 DIAGNOSIS — M79.675 PAIN OF TOE OF LEFT FOOT: ICD-10-CM

## 2018-05-04 DIAGNOSIS — Z12.5 SPECIAL SCREENING FOR MALIGNANT NEOPLASM OF PROSTATE: ICD-10-CM

## 2018-05-04 DIAGNOSIS — Z00.00 ROUTINE GENERAL MEDICAL EXAMINATION AT A HEALTH CARE FACILITY: Primary | ICD-10-CM

## 2018-05-04 DIAGNOSIS — E66.9 OBESITY WITHOUT SERIOUS COMORBIDITY, UNSPECIFIED CLASSIFICATION, UNSPECIFIED OBESITY TYPE: ICD-10-CM

## 2018-05-04 LAB
ALBUMIN SERPL-MCNC: 4.2 G/DL (ref 3.4–5)
ALP SERPL-CCNC: 71 U/L (ref 40–150)
ALT SERPL W P-5'-P-CCNC: 39 U/L (ref 0–70)
ANION GAP SERPL CALCULATED.3IONS-SCNC: 2 MMOL/L (ref 3–14)
AST SERPL W P-5'-P-CCNC: 18 U/L (ref 0–45)
BILIRUB SERPL-MCNC: 0.5 MG/DL (ref 0.2–1.3)
BUN SERPL-MCNC: 11 MG/DL (ref 7–30)
CALCIUM SERPL-MCNC: 9.4 MG/DL (ref 8.5–10.1)
CHLORIDE SERPL-SCNC: 106 MMOL/L (ref 94–109)
CHOLEST SERPL-MCNC: 221 MG/DL
CO2 SERPL-SCNC: 31 MMOL/L (ref 20–32)
CREAT SERPL-MCNC: 0.93 MG/DL (ref 0.66–1.25)
GFR SERPL CREATININE-BSD FRML MDRD: 84 ML/MIN/1.7M2
GLUCOSE SERPL-MCNC: 108 MG/DL (ref 70–99)
HBA1C MFR BLD: 5.9 % (ref 0–5.6)
HDLC SERPL-MCNC: 43 MG/DL
LDLC SERPL CALC-MCNC: 156 MG/DL
NONHDLC SERPL-MCNC: 178 MG/DL
POTASSIUM SERPL-SCNC: 4.4 MMOL/L (ref 3.4–5.3)
PROT SERPL-MCNC: 7.3 G/DL (ref 6.8–8.8)
PSA SERPL-ACNC: 3.57 UG/L (ref 0–4)
SODIUM SERPL-SCNC: 139 MMOL/L (ref 133–144)
TRIGL SERPL-MCNC: 110 MG/DL

## 2018-05-04 PROCEDURE — 80061 LIPID PANEL: CPT | Performed by: FAMILY MEDICINE

## 2018-05-04 PROCEDURE — 80053 COMPREHEN METABOLIC PANEL: CPT | Performed by: FAMILY MEDICINE

## 2018-05-04 PROCEDURE — 99396 PREV VISIT EST AGE 40-64: CPT | Performed by: FAMILY MEDICINE

## 2018-05-04 PROCEDURE — 36415 COLL VENOUS BLD VENIPUNCTURE: CPT | Performed by: FAMILY MEDICINE

## 2018-05-04 PROCEDURE — 83036 HEMOGLOBIN GLYCOSYLATED A1C: CPT | Performed by: FAMILY MEDICINE

## 2018-05-04 PROCEDURE — G0103 PSA SCREENING: HCPCS | Performed by: FAMILY MEDICINE

## 2018-05-04 RX ORDER — FLUOCINONIDE 0.5 MG/G
OINTMENT TOPICAL 2 TIMES DAILY
Qty: 60 G | Refills: 1 | Status: SHIPPED | OUTPATIENT
Start: 2018-05-04 | End: 2018-11-09

## 2018-05-04 NOTE — PROGRESS NOTES
SUBJECTIVE:   CC: Brice Trevino is an 57 year old male who presents for preventative health visit.     Physical   Annual:     Getting at least 3 servings of Calcium per day::  NO    Bi-annual eye exam::  Yes    Dental care twice a year::  Yes    Sleep apnea or symptoms of sleep apnea::  Daytime drowsiness    Diet::  Low salt, Low fat/cholesterol and Carbohydrate counting    Frequency of exercise::  2-3 days/week    Duration of exercise::  Less than 15 minutes    Taking medications regularly::  Yes    Medication side effects::  None    Additional concerns today::  No              Today's PHQ-2 Score:   PHQ-2 ( 1999 Pfizer) 11/7/2017   Q1: Little interest or pleasure in doing things 0   Q2: Feeling down, depressed or hopeless 0   PHQ-2 Score 0   Q1: Little interest or pleasure in doing things -   Q2: Feeling down, depressed or hopeless -   PHQ-2 Score -       Abuse: Current or Past(Physical, Sexual or Emotional)- No  Do you feel safe in your environment - Yes    Social History   Substance Use Topics     Smoking status: Never Smoker     Smokeless tobacco: Never Used     Alcohol use No     No flowsheet data found.    Last PSA:   PSA   Date Value Ref Range Status   02/09/2016 1.12 0 - 4 ug/L Final       Reviewed orders with patient. Reviewed health maintenance and updated orders accordingly - Yes  Labs reviewed in EPIC    Reviewed and updated as needed this visit by clinical staff         Reviewed and updated as needed this visit by Provider            Review of Systems  C: NEGATIVE for fever, chills, change in weight  I: NEGATIVE for worrisome rashes, moles or lesions  E: NEGATIVE for vision changes or irritation  ENT: NEGATIVE for ear, mouth and throat problems  R: NEGATIVE for significant cough or SOB  CV: NEGATIVE for chest pain, palpitations or peripheral edema  GI: NEGATIVE for nausea, abdominal pain, heartburn, or change in bowel habits   male: negative for dysuria, hematuria, decreased urinary stream, erectile  "dysfunction, urethral discharge  M: NEGATIVE for significant arthralgias or myalgia  N: NEGATIVE for weakness, dizziness or paresthesias  P: NEGATIVE for changes in mood or affect    OBJECTIVE:   /68  Pulse 71  Temp 97.9  F (36.6  C) (Oral)  Resp 16  Ht 5' 8.25\" (1.734 m)  Wt 200 lb 9.6 oz (91 kg)  SpO2 96%  BMI 30.28 kg/m2    Physical Exam  GENERAL: healthy, alert and no distress  EYES: Eyes grossly normal to inspection, PERRL and conjunctivae and sclerae normal  HENT: ear canals and TM's normal, nose and mouth without ulcers or lesions  NECK: no adenopathy, no asymmetry, masses, or scars and thyroid normal to palpation  RESP: lungs clear to auscultation - no rales, rhonchi or wheezes  CV: regular rate and rhythm, normal S1 S2, no S3 or S4, no murmur, click or rub, no peripheral edema and peripheral pulses strong  ABDOMEN: soft, nontender, no hepatosplenomegaly, no masses and bowel sounds normal  MS: no gross musculoskeletal defects noted, no edema  SKIN: no suspicious lesions or rashes  NEURO: Normal strength and tone, mentation intact and speech normal  PSYCH: mentation appears normal, affect normal/bright    ASSESSMENT/PLAN:       ICD-10-CM    1. Routine general medical examination at a health care facility Z00.00 PSA, screen     Lipid panel reflex to direct LDL Fasting     Comprehensive metabolic panel     JUST IN CASE     Hemoglobin A1c   2. Obesity without serious comorbidity, unspecified classification, unspecified obesity type E66.9 Hemoglobin A1c   3. Special screening for malignant neoplasm of prostate Z12.5 PSA, screen   4. Dermatitis L30.9 fluocinonide (LIDEX) 0.05 % ointment   5. Pain of toe of left foot M79.675 PODIATRY/FOOT & ANKLE SURGERY REFERRAL       COUNSELING:   Reviewed preventive health counseling, as reflected in patient instructions       Regular exercise       Healthy diet/nutrition       HIV screeninx in teen years, 1x in adult years, and at intervals if high risk       " "Colon cancer screening       Prostate cancer screening         reports that he has never smoked. He has never used smokeless tobacco.    Estimated body mass index is 30.28 kg/(m^2) as calculated from the following:    Height as of this encounter: 5' 8.25\" (1.734 m).    Weight as of this encounter: 200 lb 9.6 oz (91 kg).   Weight management plan: Discussed healthy diet and exercise guidelines and patient will follow up in 12 months in clinic to re-evaluate.    Counseling Resources:  ATP IV Guidelines  Pooled Cohorts Equation Calculator  FRAX Risk Assessment  ICSI Preventive Guidelines  Dietary Guidelines for Americans, 2010  USDA's MyPlate  ASA Prophylaxis  Lung CA Screening    Leo Bhatia MD  [facility]  "

## 2018-05-04 NOTE — MR AVS SNAPSHOT
After Visit Summary   5/4/2018    Brice Trevino    MRN: [id number]           Patient Information     Date Of Birth          1960        Visit Information        Provider Department      5/4/2018 9:20 AM Leo Bhatia MD [facility]        Today's Diagnoses     Routine general medical examination at a health care facility    -  1    Obesity without serious comorbidity, unspecified classification, unspecified obesity type        Special screening for malignant neoplasm of prostate        Dermatitis        Pain of toe of left foot          Care Instructions      Preventive Health Recommendations  Male Ages 50 - 64    Yearly exam:             See your health care provider every year in order to  o   Review health changes.   o   Discuss preventive care.    o   Review your medicines if your doctor has prescribed any.     Have a cholesterol test every 5 years, or more frequently if you are at risk for high cholesterol/heart disease.     Have a diabetes test (fasting glucose) every three years. If you are at risk for diabetes, you should have this test more often.     Have a colonoscopy at age 50, or have a yearly FIT test (stool test). These exams will check for colon cancer.      Talk with your health care provider about whether or not a prostate cancer screening test (PSA) is right for you.    You should be tested each year for STDs (sexually transmitted diseases), if you re at risk.     Shots: Get a flu shot each year. Get a tetanus shot every 10 years.     Nutrition:    Eat at least 5 servings of fruits and vegetables daily.     Eat whole-grain bread, whole-wheat pasta and brown rice instead of white grains and rice.     Talk to your provider about Calcium and Vitamin D.     Lifestyle    Exercise for at least 150 minutes a week (30 minutes a day, 5 days a week). This will help you control your weight and prevent disease.     Limit alcohol to one drink per day.     No smoking.      Wear sunscreen to prevent skin cancer.     See your dentist every six months for an exam and cleaning.     See your eye doctor every 1 to 2 years.            Follow-ups after your visit        Additional Services     PODIATRY/FOOT & ANKLE SURGERY REFERRAL       Your provider has referred you to: FM[facility]: [facility] [place] [phone]   [email or web address]  FMG: [facility] [place] [phone]   [email or web address]  FMG: [facility] [place] [phone]   [email or web address]  FMG: [facility] [place] [phone]   [email or web address]  FMG: [facility] [place] [phone]   [email or web address]  FMG: [facility] [phone]   [email or web address]  [facility]: [facility]: [address] [place] [address] Patient Clinic Line: [phone]     Please be aware that coverage of these services is subject to the terms and limitations of your health insurance plan.  Call member services at your health plan with any benefit or coverage questions.      Please bring the following to your appointment:  >>   Any x-rays, CTs or MRIs which have been performed.  Contact the facility where they were done to arrange for  prior to your scheduled appointment.    >>   List of current medications   >>   This referral request   >>   Any documents/labs given to you for this referral                  Who to contact     If you have questions or need follow up information about today's clinic visit or your schedule please contact [facility] directly at [phone].  Normal or non-critical lab and imaging results will be communicated to you by MyChart, letter or phone within 4 business days after the  "clinic has received the results. If you do not hear from us within 7 days, please contact the clinic through [employer] or phone. If you have a critical or abnormal lab result, we will notify you by phone as soon as possible.  Submit refill requests through [employer] or call your pharmacy and they will forward the refill request to us. Please allow 3 business days for your refill to be completed.          Additional Information About Your Visit        PhotoBox Information     [employer] gives you secure access to your electronic health record. If you see a primary care provider, you can also send messages to your care team and make appointments. If you have questions, please call your primary care clinic.  If you do not have a primary care provider, please call [phone] and they will assist you.        Care EveryWhere ID     This is your Care EveryWhere ID. This could be used by other organizations to access your [place] medical records  [id number]        Your Vitals Were     Pulse Temperature Respirations Height Pulse Oximetry BMI (Body Mass Index)    71 97.9  F (36.6  C) (Oral) 16 5' 8.25\" (1.734 m) 96% 30.28 kg/m2       Blood Pressure from Last 3 Encounters:   05/04/18 101/68   11/07/17 102/66   04/07/17 97/62    Weight from Last 3 Encounters:   05/04/18 200 lb 9.6 oz (91 kg)   11/07/17 199 lb 6.4 oz (90.4 kg)   04/07/17 198 lb (89.8 kg)              We Performed the Following     Comprehensive metabolic panel     Hemoglobin A1c     JUST IN CASE     Lipid panel reflex to direct LDL Fasting     PODIATRY/FOOT & ANKLE SURGERY REFERRAL     PSA, screen          Today's Medication Changes          These changes are accurate as of 5/4/18 10:10 AM.  If you have any questions, ask your nurse or doctor.               Stop taking these medicines if you haven't already. Please contact your care team if you have questions.     azithromycin 250 MG tablet   Commonly known as:  ZITHROMAX   Stopped by:  Leo Bhatia MD        "    ciclopirox 0.77 % Gel   Stopped by:  Leo Bhatia MD           econazole nitrate 1 % cream   Stopped by:  Leo Bhatia MD           guaiFENesin-codeine 100-10 MG/5ML Syrp syrup   Commonly known as:  guaiFENesin AC   Stopped by:  Leo Bhatia MD                Where to get your medicines      These medications were sent to [facility] [address] [place], [place] [address] [address] [place] [address], [facility] [address]     Phone:  [phone]     fluocinonide 0.05 % ointment                Primary Care Provider Office Phone # Fax #    Leo Bhatia [phone]       [address] [facility] [address]        Equal Access to Services     [facility] : Hadii aad ku hadasho Soomaali, waaxda luqadaha, qaybta kaalmada adeegyada, job hairston hayaagilberto khoury . So [facility] [phone].    ATENCIÓN: Si habla español, tiene a lazo disposición servicios gratuitos de asistencia lingüística. [facility] [phone].    We comply with applicable federal civil rights laws and [place] laws. We do not discriminate on the basis of race, color, national origin, age, disability, sex, sexual orientation, or gender identity.            Thank you!     Thank you for choosing [facility]  for your care. Our goal is always to provide you with excellent care. Hearing back from our patients is one way we can continue to improve our services. Please take a few minutes to complete the written survey that you may receive in the mail after your visit with us. Thank you!             Your Updated Medication List - Protect others around you: Learn how to safely use, store and throw away your medicines at www.disposemymeds.org.          This list is accurate as of 5/4/18 10:10 AM.  Always use your most recent med list.                   Brand Name Dispense Instructions for use Diagnosis    fluocinonide 0.05 % ointment    LIDEX    60 g    Apply  topically 2 times daily    Dermatitis       rizatriptan 5 MG tablet    MAXALT    18 tablet    Take 1-2 tablets (5-10 mg) by mouth at onset of headache for migraine May repeat in 2 hours. Max 6 tablets/24 hours.    Migraine with aura and without status migrainosus, not intractable       triamcinolone 0.1 % cream    KENALOG    80 g    Apply sparingly to affected area three times daily as needed    Eczema, unspecified type

## 2018-07-30 ENCOUNTER — OFFICE VISIT (OUTPATIENT)
Dept: FAMILY MEDICINE | Facility: CLINIC | Age: 58
End: 2018-07-30
Payer: COMMERCIAL

## 2018-07-30 VITALS
DIASTOLIC BLOOD PRESSURE: 68 MMHG | RESPIRATION RATE: 16 BRPM | WEIGHT: 200.2 LBS | HEIGHT: 68 IN | SYSTOLIC BLOOD PRESSURE: 105 MMHG | TEMPERATURE: 98.3 F | BODY MASS INDEX: 30.34 KG/M2 | OXYGEN SATURATION: 94 % | HEART RATE: 96 BPM

## 2018-07-30 DIAGNOSIS — R05.9 COUGH: ICD-10-CM

## 2018-07-30 DIAGNOSIS — J01.90 ACUTE SINUSITIS WITH SYMPTOMS > 10 DAYS: Primary | ICD-10-CM

## 2018-07-30 PROCEDURE — 99213 OFFICE O/P EST LOW 20 MIN: CPT | Performed by: PHYSICIAN ASSISTANT

## 2018-07-30 RX ORDER — BENZONATATE 200 MG/1
200 CAPSULE ORAL 3 TIMES DAILY PRN
Qty: 20 CAPSULE | Refills: 0 | Status: SHIPPED | OUTPATIENT
Start: 2018-07-30 | End: 2018-11-09

## 2018-07-30 RX ORDER — CODEINE PHOSPHATE AND GUAIFENESIN 10; 100 MG/5ML; MG/5ML
1-2 SOLUTION ORAL EVERY 4 HOURS PRN
Qty: 180 ML | Refills: 0 | Status: SHIPPED | OUTPATIENT
Start: 2018-07-30 | End: 2018-11-09

## 2018-07-30 RX ORDER — DOXYCYCLINE 100 MG/1
1 TABLET ORAL EVERY 12 HOURS
Qty: 14 TABLET | Refills: 0 | Status: SHIPPED | OUTPATIENT
Start: 2018-07-30 | End: 2018-08-06

## 2018-07-30 ASSESSMENT — PAIN SCALES - GENERAL: PAINLEVEL: MILD PAIN (2)

## 2018-07-30 NOTE — PATIENT INSTRUCTIONS
At [facility], we strive to deliver an exceptional experience to you, every time we see you.  If you receive a survey in the mail, please send us back your thoughts. We really do value your feedback.    Based on your medical history, these are the current health maintenance/preventive care services that you are due for (some may have been done at this visit.)  Health Maintenance Due   Topic Date Due     ADVANCE DIRECTIVE PLANNING Q5 YRS  08/23/2015       Suggested websites for health information:  Www.[employer].org : Up to date and easily searchable information on multiple topics.  Www.[place].gov : medication info, interactive tutorials, watch real surgeries online  Www.familydoctor.org : good info from the Academy of Family Physicians  Www.cdc.gov : public health info, travel advisories, epidemics (H1N1)  Www.aap.org : children's health info, normal development, vaccinations  Www.health.[facility].[place].us : [place] dept of health, public health issues in [place], N1N1    Your care team:                            Family Medicine Internal Medicine   MD Pepe Berman MD Shantel Branch-Fleming, MD Katya Georgiev PA-C Megan Hill, APRN CNP    Paul Baker MD Pediatrics   Henrry Mcnamara, PAJoeC  Randee Maguire, CNP MD Arelis Garner APRN CNP   MD Manisha Cruz MD Deborah Mielke, MD Kim Thein, APRN CNP      Clinic hours: Monday - Thursday 7 am-7 pm; Fridays 7 am-5 pm.   Urgent care: Monday - Friday 11 am-9 pm; Saturday and Sunday 9 am-5 pm.  Pharmacy : Monday -Thursday 8 am-8 pm; Friday 8 am-6 pm; Saturday and Sunday 9 am-5 pm.     Clinic: [phone]   Pharmacy: [phone]        Sinusitis [Abx Tx]    The sinuses are air-filled spaces within the bones of the face. They connect to the inside of the nose. Sinusitis is an inflammation of the tissue lining the sinus cavity. Sinus inflammation can occur during a cold or hay-fever (allergies to pollens and  other particles in the air) and cause symptoms of sinus congestion and fullness. A sinus infection causes fever, headache and facial pain. There is usually green or yellow drainage from the nose or into the back of the throat (post-nasal drip). Antibiotics are prescribed to treat this condition.  Home Care:  Drink plenty of water, hot tea, and other liquids to stay well hydrated. This thins the mucus and promotes sinus drainage.  Apply heat to the painful areas of the face. Use a towel soaked in hot water. Or,  the shower and direct the hot spray onto your face. This is a good way to inhale warm water vapor and get heat on your face at the same time. (Cover your mouth and nose with your hands so you can still breathe as you do this.)  Use a vaporizer with products such as Vicks VapoRub (contains menthol) at night. Suck on peppermint, menthol or eucalyptus hard candies during the day.  An expectorant containing guaifenesin (such as Robitussin), helps to thin the mucus and promote drainage from the sinuses.  Over-the-counter decongestants may be used unless a similar medicine was prescribed. Nasal sprays work the fastest. Use one that contains phenylephrine (Dmitriy-synephrine, Sinex and others) or oxymetazoline (Afrin). First blow the nose gently to remove mucus, then apply the drops. Do not use these medicines more often than directed on the label or for more than three days or symptoms may worsen. You may also use tablets containing pseudoephedrine (Sudafed). Many sinus remedies combine ingredients, which may increase side effects. Read the labels or ask the pharmacist for help. NOTE: Persons with high blood pressure should not use decongestants. They can raise blood pressure.  Antihistamines are useful if allergies are a cause of your sinusitis. The mildest one is chlorpheniramine (available without a prescription). The dose for adults is 8-12mg three times a day. [NOTE: Do not use chlorpheniramine if you have  glaucoma or if you are a man with trouble urinating due to an enlarged prostate.] Claritin (loratidine) is an antihistamine that causes less drowsiness and is a good alternative for daytime use.  Do not use nasal rinses or irrigation during an acute sinus infection, unless advised by your doctor. Rinsing may spread the infection to other sinuses.  You may use acetaminophen (Tylenol) or ibuprofen (Motrin, Advil) to control pain, unless another pain medicine was prescribed. [ NOTE: If you have chronic liver or kidney disease or ever had a stomach ulcer, talk with your doctor before using these medicines.] (Aspirin should never be used in anyone under 18 years of age who is ill with a fever. It may cause severe liver damage.)  Finish the full course, even if you are feeling better after a few days.  Follow Up  with your doctor or this facility in one week or as instructed by our staff if not improving.  Get Prompt Medical Attention  if any of the following occur:  Facial pain or headache becomes more severe  Stiff neck  Unusual drowsiness or confusion, or not acting like your normal self  Swelling of the forehead or eyelids  Vision problems including blurred or double vision  Fever of 100.4 F (38 C) or higher, or as directed by your healthcare provider  Seizure    [id number] [facility], [address] [place] [address], [place] [address]. All rights reserved. This information is not intended as a substitute for professional medical care. Always follow your healthcare professional's instructions.

## 2018-07-30 NOTE — PROGRESS NOTES
SUBJECTIVE:   Brice Trevino is a 57 year old male who presents to clinic today for the following health issues:        CHEST Problem     Onset: about a week ago Saturday (10 days ago)started with rhinitis, cough, body aches, difficulyy sleeping, initially seemed to be improving but now     Description:   Location:  entire chest  Character: achey  Radiation: none  Duration: 15-20 minutes     Intensity: severe    Progression of Symptoms:  worsening    Accompanying Signs & Symptoms:  Shortness of breath: no  Sweating: no  Nausea/vomiting: no  Lightheadedness: Yes  Palpitations: no  Fever/Chills: no  Cough: YES  Heartburn: YES    History:   Family history of heart disease YES- grandfather  Tobacco use: no    Precipitating factors:   Worse with exertion: no  Worse with deep breaths :  no  Related to food: no         Therapies Tried and outcome: Afrin for 4 4-5 days, no relief          Allergies   Allergen Reactions     No Known Drug Allergies        Patient Active Problem List   Diagnosis     Indigestion     Obesity     Migraine       Past Medical History:   Diagnosis Date     Dermatitis          Current Outpatient Prescriptions on File Prior to Visit:  fluocinonide (LIDEX) 0.05 % ointment Apply topically 2 times daily   rizatriptan (MAXALT) 5 MG tablet Take 1-2 tablets (5-10 mg) by mouth at onset of headache for migraine May repeat in 2 hours. Max 6 tablets/24 hours.   triamcinolone (KENALOG) 0.1 % cream Apply sparingly to affected area three times daily as needed     No current facility-administered medications on file prior to visit.     Social History   Substance Use Topics     Smoking status: Never Smoker     Smokeless tobacco: Never Used     Alcohol use No       Family History   Problem Relation Age of Onset     Diabetes Father      type 2     GASTROINTESTINAL DISEASE Mother      reflux     Eye Disorder Maternal Grandfather      cateracts     HEART DISEASE Paternal Grandfather      heart attack age 60's      "Melanoma No family hx of      Skin Cancer No family hx of        ROS:  Consitutional: As above  ENT: As above  Respiratory: As above    OBJECTIVE:  /68 (BP Location: Right arm, Patient Position: Sitting, Cuff Size: Adult Large)  Pulse 96  Temp 98.3  F (36.8  C) (Oral)  Resp 16  Ht 5' 8.25\" (1.734 m)  Wt 200 lb 3.2 oz (90.8 kg)  SpO2 94%  BMI 30.22 kg/m2  GENERAL APPEARANCE: healthy, alert and no distress  EYES: conjunctiva clear  HENT  TMs w/o erythema, effusion or bulging.  Nose and mouth without ulcers, erythema or lesions.  NO tonsillar enlargement erythema or exudates.   NECK: supple, nontender, no lymphadenopathy  RESP: lungs clear to auscultation - no rales, rhonchi or wheezes  CV: regular rates and rhythm, normal S1 S2, no murmur noted  NEURO: awake, alert          ASSESSMENT: Well appearing.    ICD-10-CM    1. Acute sinusitis with symptoms > 10 days J01.90 doxycycline Monohydrate 100 MG TABS   2. Cough R05 guaiFENesin-codeine (ROBITUSSIN AC) 100-10 MG/5ML SOLN solution     benzonatate (TESSALON) 200 MG capsule         PLAN:  Lots of rest and fluids.  RTC if any worsening symptoms or if not improving.    Henrry Mcnamara PA-C             "

## 2018-07-30 NOTE — MR AVS SNAPSHOT
After Visit Summary   7/30/2018    Brice Trevino    MRN: [id number]           Patient Information     Date Of Birth          1960        Visit Information        Provider Department      7/30/2018 10:00 AM Alfredo Mcnamara PA [facility] [address]        Today's Diagnoses     Acute sinusitis with symptoms > 10 days    -  1    Cough          Care Instructions    At [facility], we strive to deliver an exceptional experience to you, every time we see you.  If you receive a survey in the mail, please send us back your thoughts. We really do value your feedback.    Based on your medical history, these are the current health maintenance/preventive care services that you are due for (some may have been done at this visit.)  Health Maintenance Due   Topic Date Due     ADVANCE DIRECTIVE PLANNING Q5 YRS  08/23/2015       Suggested websites for health information:  Www.T-Quad 22 : Up to date and easily searchable information on multiple topics.  Www.SeeSaw.com.gov : medication info, interactive tutorials, watch real surgeries online  Www.familydoctor.org : good info from the Academy of Family Physicians  Www.cdc.gov : public health info, travel advisories, epidemics (H1N1)  Www.aap.org : children's health info, normal development, vaccinations  Www.health.[facility].[place].us : [place] dept of health, public health issues in [place], N1N1    Your care team:                            Family Medicine Internal Medicine   MD Pepe Berman MD Shantel Branch-Fleming, MD Katya Georgiev PA-C Megan Hill, LENARD Baker MD Pediatrics   DAWOOD Mackay, MD Arelis Sheth APRN CNP   MD Manisha Cruz MD Deborah Mielke, MD Kim Thein, APRN CNP      Clinic hours: Monday - Thursday 7 am-7 pm; Fridays 7 am-5 pm.   Urgent care: Monday - Friday 11 am-9 pm; Saturday and Sunday 9 am-5 pm.  Pharmacy : Monday -Thursday  8 am-8 pm; Friday 8 am-6 pm; Saturday and Sunday 9 am-5 pm.     Clinic: [phone]   Pharmacy: [phone]        Sinusitis [Abx Tx]    The sinuses are air-filled spaces within the bones of the face. They connect to the inside of the nose. Sinusitis is an inflammation of the tissue lining the sinus cavity. Sinus inflammation can occur during a cold or hay-fever (allergies to pollens and other particles in the air) and cause symptoms of sinus congestion and fullness. A sinus infection causes fever, headache and facial pain. There is usually green or yellow drainage from the nose or into the back of the throat (post-nasal drip). Antibiotics are prescribed to treat this condition.  Home Care:  Drink plenty of water, hot tea, and other liquids to stay well hydrated. This thins the mucus and promotes sinus drainage.  Apply heat to the painful areas of the face. Use a towel soaked in hot water. Or,  the shower and direct the hot spray onto your face. This is a good way to inhale warm water vapor and get heat on your face at the same time. (Cover your mouth and nose with your hands so you can still breathe as you do this.)  Use a vaporizer with products such as Vicks VapoRub (contains menthol) at night. Suck on peppermint, menthol or eucalyptus hard candies during the day.  An expectorant containing guaifenesin (such as Robitussin), helps to thin the mucus and promote drainage from the sinuses.  Over-the-counter decongestants may be used unless a similar medicine was prescribed. Nasal sprays work the fastest. Use one that contains phenylephrine (Dmitriy-synephrine, Sinex and others) or oxymetazoline (Afrin). First blow the nose gently to remove mucus, then apply the drops. Do not use these medicines more often than directed on the label or for more than three days or symptoms may worsen. You may also use tablets containing pseudoephedrine (Sudafed). Many sinus remedies combine ingredients, which may increase  side effects. Read the labels or ask the pharmacist for help. NOTE: Persons with high blood pressure should not use decongestants. They can raise blood pressure.  Antihistamines are useful if allergies are a cause of your sinusitis. The mildest one is chlorpheniramine (available without a prescription). The dose for adults is 8-12mg three times a day. [NOTE: Do not use chlorpheniramine if you have glaucoma or if you are a man with trouble urinating due to an enlarged prostate.] Claritin (loratidine) is an antihistamine that causes less drowsiness and is a good alternative for daytime use.  Do not use nasal rinses or irrigation during an acute sinus infection, unless advised by your doctor. Rinsing may spread the infection to other sinuses.  You may use acetaminophen (Tylenol) or ibuprofen (Motrin, Advil) to control pain, unless another pain medicine was prescribed. [ NOTE: If you have chronic liver or kidney disease or ever had a stomach ulcer, talk with your doctor before using these medicines.] (Aspirin should never be used in anyone under 18 years of age who is ill with a fever. It may cause severe liver damage.)  Finish the full course, even if you are feeling better after a few days.  Follow Up  with your doctor or this facility in one week or as instructed by our staff if not improving.  Get Prompt Medical Attention  if any of the following occur:  Facial pain or headache becomes more severe  Stiff neck  Unusual drowsiness or confusion, or not acting like your normal self  Swelling of the forehead or eyelids  Vision problems including blurred or double vision  Fever of 100.4 F (38 C) or higher, or as directed by your healthcare provider  Seizure    [id number] Hammad Faustin, [address] [place] [address], [place] [address]. All rights reserved. This information is not intended as a substitute for professional medical care. Always follow your healthcare professional's instructions.                Follow-ups after your  "visit        Follow-up notes from your care team     Return if symptoms worsen or fail to improve.      Who to contact     If you have questions or need follow up information about today's clinic visit or your schedule please contact [facility] directly at [phone].  Normal or non-critical lab and imaging results will be communicated to you by MyChart, letter or phone within 4 business days after the clinic has received the results. If you do not hear from us within 7 days, please contact the clinic through [email or web address] or phone. If you have a critical or abnormal lab result, we will notify you by phone as soon as possible.  Submit refill requests through [employer] or call your pharmacy and they will forward the refill request to us. Please allow 3 business days for your refill to be completed.          Additional Information About Your Visit        MyChart Information     [employer] gives you secure access to your electronic health record. If you see a primary care provider, you can also send messages to your care team and make appointments. If you have questions, please call your primary care clinic.  If you do not have a primary care provider, please call [phone] and they will assist you.        Care EveryWhere ID     This is your Care EveryWhere ID. This could be used by other organizations to access your [place] medical records  [id number]        Your Vitals Were     Pulse Temperature Respirations Height Pulse Oximetry BMI (Body Mass Index)    96 98.3  F (36.8  C) (Oral) 16 5' 8.25\" (1.734 m) 94% 30.22 kg/m2       Blood Pressure from Last 3 Encounters:   07/30/18 105/68   05/04/18 101/68   11/07/17 102/66    Weight from Last 3 Encounters:   07/30/18 200 lb 3.2 oz (90.8 kg)   05/04/18 200 lb 9.6 oz (91 kg)   11/07/17 199 lb 6.4 oz (90.4 kg)              Today, you had the following     No orders found for display         Today's Medication Changes          These changes are accurate as of " 7/30/18 10:14 AM.  If you have any questions, ask your nurse or doctor.               Start taking these medicines.        Dose/Directions    benzonatate 200 MG capsule   Commonly known as:  TESSALON   Used for:  Cough   Started by:  Alfredo Mcnamara PA        Dose:  200 mg   Take 1 capsule (200 mg) by mouth 3 times daily as needed for cough   Quantity:  20 capsule   Refills:  0       doxycycline Monohydrate 100 MG Tabs   Used for:  Acute sinusitis with symptoms > 10 days   Started by:  Alfredo Mcnamara PA        Dose:  1 tablet   Take 1 tablet by mouth every 12 hours for 7 days   Quantity:  14 tablet   Refills:  0       guaiFENesin-codeine 100-10 MG/5ML Soln solution   Commonly known as:  ROBITUSSIN AC   Used for:  Cough   Started by:  Alfredo Mcnamara PA        Dose:  1-2 tsp.   Take 5-10 mLs by mouth every 4 hours as needed for cough   Quantity:  180 mL   Refills:  0            Where to get your medicines      These medications were sent to [facility] [address] [place], [place] [address] [employer] [address] [place] [address] [employer] [facility] [address]     Phone:  [phone]     benzonatate 200 MG capsule    doxycycline Monohydrate 100 MG Tabs         Some of these will need a paper prescription and others can be bought over the counter.  Ask your nurse if you have questions.     Bring a paper prescription for each of these medications     guaiFENesin-codeine 100-10 MG/5ML Soln solution                Primary Care Provider Office Phone # Fax #    Leo Grady Bhatia [phone]       [facility] [address]        Equal Access to Services     [facility] AH: Hadii fredy curiel hadasho Soomaali, waaxda luqadaha, qaybta kaalmada aderazia, job miller. So [facility] [phone].    ATENCIÓN: Si habla español, tiene a lazo disposición servicios gratuitos de asistencia lingüística. Anam bright [phone].    We comply  with applicable federal civil rights laws and [place] laws. We do not discriminate on the basis of race, color, national origin, age, disability, sex, sexual orientation, or gender identity.            Thank you!     Thank you for choosing [facility]  for your care. Our goal is always to provide you with excellent care. Hearing back from our patients is one way we can continue to improve our services. Please take a few minutes to complete the written survey that you may receive in the mail after your visit with us. Thank you!             Your Updated Medication List - Protect others around you: Learn how to safely use, store and throw away your medicines at www.disposemymeds.org.          This list is accurate as of 7/30/18 10:14 AM.  Always use your most recent med list.                   Brand Name Dispense Instructions for use Diagnosis    benzonatate 200 MG capsule    TESSALON    20 capsule    Take 1 capsule (200 mg) by mouth 3 times daily as needed for cough    Cough       doxycycline Monohydrate 100 MG Tabs     14 tablet    Take 1 tablet by mouth every 12 hours for 7 days    Acute sinusitis with symptoms > 10 days       fluocinonide 0.05 % ointment    LIDEX    60 g    Apply topically 2 times daily    Dermatitis       guaiFENesin-codeine 100-10 MG/5ML Soln solution    ROBITUSSIN AC    180 mL    Take 5-10 mLs by mouth every 4 hours as needed for cough    Cough       rizatriptan 5 MG tablet    MAXALT    18 tablet    Take 1-2 tablets (5-10 mg) by mouth at onset of headache for migraine May repeat in 2 hours. Max 6 tablets/24 hours.    Migraine with aura and without status migrainosus, not intractable       triamcinolone 0.1 % cream    KENALOG    80 g    Apply sparingly to affected area three times daily as needed    Eczema, unspecified type

## 2018-11-09 ENCOUNTER — OFFICE VISIT (OUTPATIENT)
Dept: FAMILY MEDICINE | Facility: CLINIC | Age: 58
End: 2018-11-09
Payer: COMMERCIAL

## 2018-11-09 VITALS
DIASTOLIC BLOOD PRESSURE: 65 MMHG | SYSTOLIC BLOOD PRESSURE: 111 MMHG | OXYGEN SATURATION: 96 % | WEIGHT: 191.5 LBS | HEART RATE: 82 BPM | TEMPERATURE: 98.3 F | RESPIRATION RATE: 16 BRPM | BODY MASS INDEX: 28.9 KG/M2

## 2018-11-09 DIAGNOSIS — R73.03 PREDIABETES: Primary | ICD-10-CM

## 2018-11-09 DIAGNOSIS — R97.20 INCREASING PROSTATE SPECIFIC ANTIGEN LEVEL: ICD-10-CM

## 2018-11-09 DIAGNOSIS — R21 RASH: ICD-10-CM

## 2018-11-09 DIAGNOSIS — E78.2 MIXED HYPERLIPIDEMIA: ICD-10-CM

## 2018-11-09 LAB — HBA1C MFR BLD: 5.7 % (ref 0–5.6)

## 2018-11-09 PROCEDURE — 99213 OFFICE O/P EST LOW 20 MIN: CPT | Performed by: FAMILY MEDICINE

## 2018-11-09 PROCEDURE — 84153 ASSAY OF PSA TOTAL: CPT | Performed by: FAMILY MEDICINE

## 2018-11-09 PROCEDURE — 83036 HEMOGLOBIN GLYCOSYLATED A1C: CPT | Performed by: FAMILY MEDICINE

## 2018-11-09 PROCEDURE — 36415 COLL VENOUS BLD VENIPUNCTURE: CPT | Performed by: FAMILY MEDICINE

## 2018-11-09 PROCEDURE — 80061 LIPID PANEL: CPT | Performed by: FAMILY MEDICINE

## 2018-11-09 RX ORDER — PRENATAL VIT 91/IRON/FOLIC/DHA 28-975-200
COMBINATION PACKAGE (EA) ORAL 2 TIMES DAILY
Qty: 12 G | Refills: 1 | Status: SHIPPED | OUTPATIENT
Start: 2018-11-09 | End: 2019-01-15

## 2018-11-09 NOTE — MR AVS SNAPSHOT
After Visit Summary   11/9/2018    Brice Trevino    MRN: [id number]           Patient Information     Date Of Birth          1960        Visit Information        Provider Department      11/9/2018 2:40 PM Leo Bhatia MD [facility]        Today's Diagnoses     Prediabetes    -  1    Mixed hyperlipidemia        Increasing prostate specific antigen level        Rash           Follow-ups after your visit        Follow-up notes from your care team     Return in about 6 months (around 5/9/2019) for Physical Exam.      Who to contact     If you have questions or need follow up information about today's clinic visit or your schedule please contact [facility] directly at [phone].  Normal or non-critical lab and imaging results will be communicated to you by MyChart, letter or phone within 4 business days after the clinic has received the results. If you do not hear from us within 7 days, please contact the clinic through Zairgehart or phone. If you have a critical or abnormal lab result, we will notify you by phone as soon as possible.  Submit refill requests through [employer] or call your pharmacy and they will forward the refill request to us. Please allow 3 business days for your refill to be completed.          Additional Information About Your Visit        MyChart Information     [employer] gives you secure access to your electronic health record. If you see a primary care provider, you can also send messages to your care team and make appointments. If you have questions, please call your primary care clinic.  If you do not have a primary care provider, please call [phone] and they will assist you.        Care EveryWhere ID     This is your Care EveryWhere ID. This could be used by other organizations to access your [place] medical records  [id number]        Your Vitals Were     Pulse Temperature Respirations Pulse Oximetry BMI (Body Mass Index)        82 98.3  F (36.8  C) (Oral) 16 96% 28.9 kg/m2        Blood Pressure from Last 3 Encounters:   11/09/18 111/65   07/30/18 105/68   05/04/18 101/68    Weight from Last 3 Encounters:   11/09/18 191 lb 8 oz (86.9 kg)   07/30/18 200 lb 3.2 oz (90.8 kg)   05/04/18 200 lb 9.6 oz (91 kg)              We Performed the Following     Hemoglobin A1c     Lipid panel reflex to direct LDL Fasting     PSA, tumor marker          Today's Medication Changes          These changes are accurate as of 11/9/18  3:34 PM.  If you have any questions, ask your nurse or doctor.               Start taking these medicines.        Dose/Directions    terbinafine 1 % cream   Commonly known as:  lamISIL AT   Used for:  Rash   Started by:  Leo Bhatia MD        Apply topically 2 times daily For fungal infection not resolved with other antifungals (e.g. Clotrimazole)   Quantity:  12 g   Refills:  1         Stop taking these medicines if you haven't already. Please contact your care team if you have questions.     benzonatate 200 MG capsule   Commonly known as:  TESSALON   Stopped by:  Leo Bhatia MD           fluocinonide 0.05 % ointment   Commonly known as:  LIDEX   Stopped by:  Leo Bhatia MD           guaiFENesin-codeine 100-10 MG/5ML Soln solution   Commonly known as:  ROBITUSSIN AC   Stopped by:  Leo Bhatia MD           rizatriptan 5 MG tablet   Commonly known as:  MAXALT   Stopped by:  Leo Bhatia MD           triamcinolone 0.1 % cream   Commonly known as:  KENALOG   Stopped by:  Leo Bhatia MD                Where to get your medicines      These medications were sent to [facility] [address] [place], [place] [address] [employer] [address] [place] [address] [employer] [address] [facility] [address]     Phone:  [phone]     terbinafine 1 % cream                Primary Care Provider Office Phone # Fax #    Leo Bhatia [phone]       [address] [place] [address]         Equal Access to Services     [facility] : Hadii aad ku hadgalinashamar Webb, waulyssesda ludeidreleonardha, qagersonnestor hughesdavionjob bedoya. So [facility] [phone].    ATENCIÓN: Si habla español, tiene a lazo disposición servicios gratuitos de asistencia lingüística. Llame al [phone].    We comply with applicable federal civil rights laws and [place] laws. We do not discriminate on the basis of race, color, national origin, age, disability, sex, sexual orientation, or gender identity.            Thank you!     Thank you for choosing [facility]  for your care. Our goal is always to provide you with excellent care. Hearing back from our patients is one way we can continue to improve our services. Please take a few minutes to complete the written survey that you may receive in the mail after your visit with us. Thank you!             Your Updated Medication List - Protect others around you: Learn how to safely use, store and throw away your medicines at www.disposemymeds.org.          This list is accurate as of 11/9/18  3:34 PM.  Always use your most recent med list.                   Brand Name Dispense Instructions for use Diagnosis    terbinafine 1 % cream    lamISIL AT    12 g    Apply topically 2 times daily For fungal infection not resolved with other antifungals (e.g. Clotrimazole)    Rash

## 2018-11-09 NOTE — PROGRESS NOTES
Subjective: Since last visit he has changed his diet to a more keto diet and as a result has lost around 10 pounds.  He feels better. no side effects with this diet.    He is also here to recheck his cholesterol A1c and to recheck his PSA.  Has mild increased frequency since last check.    He has had some rash on his heels and some similar rash on his legs.  These are whitish little plaques that he is noted that he can scrape off.  He thinks they might be fungal in nature.  They are asymptomatic    OBJECTIVE:  /65  Pulse 82  Temp 98.3  F (36.8  C) (Oral)  Resp 16  Wt 191 lb 8 oz (86.9 kg)  SpO2 96%  BMI 28.9 kg/m2   Skin reveals 3-4 mm millimeter stuck on appearing whitish plaques on his heel  Lungs are clear heart regular rate and rhythm without murmur      ICD-10-CM    1. Prediabetes R73.03 Hemoglobin A1c   2. Mixed hyperlipidemia E78.2 Lipid panel reflex to direct LDL Fasting   3. Increasing prostate specific antigen level R97.20 PSA, tumor marker   4. Rash R21 terbinafine (LAMISIL AT) 1 % cream   We will recheck his labs for diabetes lipids and PSA.  The rash seems more consistent with stucco keratoses then fungal.  despite this will treat with antifungal for 2 weeks.  He is due for follow-up with his dermatologist and he plans to see them anyhow.

## 2018-11-10 LAB
CHOLEST SERPL-MCNC: 245 MG/DL
HDLC SERPL-MCNC: 43 MG/DL
LDLC SERPL CALC-MCNC: 157 MG/DL
NONHDLC SERPL-MCNC: 202 MG/DL
PSA SERPL-MCNC: 4.52 UG/L (ref 0–4)
TRIGL SERPL-MCNC: 223 MG/DL

## 2018-11-12 ENCOUNTER — TELEPHONE (OUTPATIENT)
Dept: FAMILY MEDICINE | Facility: CLINIC | Age: 58
End: 2018-11-12

## 2018-11-12 DIAGNOSIS — R97.20 ELEVATED PROSTATE SPECIFIC ANTIGEN (PSA): Primary | ICD-10-CM

## 2018-11-12 NOTE — TELEPHONE ENCOUNTER
Left message for patient to call back and speak with a nurse.    Geri Lopez, JENELLEN, RN  [facility]

## 2018-11-12 NOTE — TELEPHONE ENCOUNTER
Dr. Bhatia --     Pt called back; [facility] and [facility] questions.    Referral placed to urology per Dr. Bhatia. Pt given scheduling phone number.    Pt declining medication for cholesterol at this time. Will continue to work on weight loss and will follow up here in 3-6 months for recheck.    JENELLE SpenceN, RN  [facility]

## 2018-11-12 NOTE — TELEPHONE ENCOUNTER
PSA (prostate cancer screening test) is slightly elevated. I would recommend follow up with a urologist to discuss options for this. My nurse will issue you a referral.     Your hemoglobin A1C level (a measure of your average blood sugar over the past three months.) is in the prediabetic range. This is better than last year.  The best way to prevent the progression to diabetes would be to lose a little weight if overweight (about 7% body weight lose make a huge difference.) and doing aerobic exercise at least 30 minutes the majority of the days of the week. We should recheck this or yourblood sugar approximately a year from now.      Your Total and LDL (bad) cholesterol levels are moderately elevated.     Your calculated risk of having a cardiovascular event such as a heart attack or stroke in the next 10 years is listed below: Usually we recommend medication such as lipitor for people whose calculated risk is above 7.5%. For you I would recommend that we consider medication. I think we could give your another 3-6 months to work on diet and recheck. Another test to consider that is not covered by insurance and not routinely done is a coronary calcium score. Higher levels of calcium are associated with higher risk and sometimes pushes us to be more aggressive with medications. This test costs about 99$ at [facility]. To schedule call [facility] or for [facility] cardiology procedure scheduling [phone]      I will have my nurse try to call you with the results to make sure you get the message. Leo    The 10-year ASCVD risk score (Brenna US Jr, et al., 2013) is: 7.9%    Values used to calculate the score:      Age: 58 years      Sex: Male      Is Non- : No      Diabetic: No      Tobacco smoker: No      Systolic Blood Pressure: 111 mmHg      Is BP treated: No      HDL Cholesterol: 43 mg/dL      Total Cholesterol: 245 mg/dL

## 2018-11-13 ENCOUNTER — PRE VISIT (OUTPATIENT)
Dept: UROLOGY | Facility: CLINIC | Age: 58
End: 2018-11-13

## 2018-11-13 ENCOUNTER — TELEPHONE (OUTPATIENT)
Dept: FAMILY MEDICINE | Facility: CLINIC | Age: 58
End: 2018-11-13

## 2018-11-13 DIAGNOSIS — E78.2 MIXED HYPERLIPIDEMIA: Primary | ICD-10-CM

## 2018-11-13 NOTE — TELEPHONE ENCOUNTER
Reason for Call:  Other referral    Detailed comments: Patient is requesting a referral for a Cardiology Calcium Exam and IAM Nava. Please assist. Thanks!    Phone Number Patient can be reached at: Home number on file [phone] (home)    Best Time: Any    Can we leave a detailed message on this number? YES    Call taken on 11/13/2018 at 12:19 PM by Paulette Love

## 2018-11-13 NOTE — TELEPHONE ENCOUNTER
MEDICAL RECORDS REQUEST   [facility]  Urology Clinic  [address] [place] [address]  PHONE: [phone]  Fax: [phone]        FUTURE VISIT INFORMATION                                                   Brice Trevino, : 1960 scheduled for future visit at [facility]    APPOINTMENT INFORMATION:    Date: 2018    Provider:  Zoe Carrillo    Reason for Visit/Diagnosis: Elevated PSA    REFERRAL INFORMATION:    Referring provider:  Leo Bhatia    Specialty: MD    Referring providers clinic:  [facility] contact number:  [phone];     RECORDS REQUESTED FOR VISIT                                                     NOTES  STATUS/DETAILS   OFFICE NOTE from referring provider  yes   OFFICE NOTE from other specialist  no   DISCHARGE SUMMARY from hospital  no   DISCHARGE REPORT from the ER  no   OPERATIVE REPORT  no   MEDICATION LIST  yes       PRE-VISIT CHECKLIST      Record collection complete Yes   Appointment appropriately scheduled           (right time/right provider) Yes   MyChart activation Yes   Questionnaire complete If no, please explain in process     Completed by: Ina Sierra

## 2018-11-13 NOTE — TELEPHONE ENCOUNTER
It is just self referred for these. No order needed. One seems to already be placed from their end.     Leo Bhatia

## 2018-11-20 ASSESSMENT — ENCOUNTER SYMPTOMS
STIFFNESS: 0
MYALGIAS: 1
BACK PAIN: 0
JOINT SWELLING: 0
MUSCLE WEAKNESS: 0
NECK PAIN: 1
HEMATURIA: 0
DIFFICULTY URINATING: 1
MUSCLE CRAMPS: 0
DYSURIA: 0
ARTHRALGIAS: 0
FLANK PAIN: 0

## 2018-11-27 ENCOUNTER — OFFICE VISIT (OUTPATIENT)
Dept: UROLOGY | Facility: CLINIC | Age: 58
End: 2018-11-27
Payer: COMMERCIAL

## 2018-11-27 VITALS
SYSTOLIC BLOOD PRESSURE: 103 MMHG | WEIGHT: 191.8 LBS | DIASTOLIC BLOOD PRESSURE: 67 MMHG | HEART RATE: 71 BPM | HEIGHT: 69 IN | BODY MASS INDEX: 28.41 KG/M2

## 2018-11-27 DIAGNOSIS — R97.20 ELEVATED PROSTATE SPECIFIC ANTIGEN (PSA): Primary | ICD-10-CM

## 2018-11-27 DIAGNOSIS — R97.20 ELEVATED PROSTATE SPECIFIC ANTIGEN (PSA): ICD-10-CM

## 2018-11-27 LAB
ALBUMIN UR-MCNC: NEGATIVE MG/DL
APPEARANCE UR: ABNORMAL
BILIRUB UR QL STRIP: NEGATIVE
COLOR UR AUTO: YELLOW
GLUCOSE UR STRIP-MCNC: NEGATIVE MG/DL
HGB UR QL STRIP: NEGATIVE
KETONES UR STRIP-MCNC: NEGATIVE MG/DL
LEUKOCYTE ESTERASE UR QL STRIP: NEGATIVE
MUCOUS THREADS #/AREA URNS LPF: PRESENT /LPF
NITRATE UR QL: NEGATIVE
PH UR STRIP: 7 PH (ref 5–7)
RBC #/AREA URNS AUTO: 1 /HPF (ref 0–2)
SOURCE: ABNORMAL
SP GR UR STRIP: 1.02 (ref 1–1.03)
UROBILINOGEN UR STRIP-MCNC: 0 MG/DL (ref 0–2)
WBC #/AREA URNS AUTO: 1 /HPF (ref 0–5)

## 2018-11-27 RX ORDER — FLUOCINONIDE 0.5 MG/G
OINTMENT TOPICAL
Refills: 1 | COMMUNITY
Start: 2018-05-04 | End: 2020-01-21

## 2018-11-27 NOTE — PROGRESS NOTES
"It was my pleasure to meet Mr. Brice Trevino, a 58 year old year old male seen in consultation today (Referred by Leo Bhatia MD) for chief complaint: Consult and Elevated PSA    HPI: Mr. Brice Trevino is an otherwise healthy gentleman who was found to have a rising PSA.     Voiding symptoms:  Over the last couple weeks, nocturia x zero.  In the past had nocturia x 1.   No incontinence.  Hesitancy: not really  Intermittency:  Not really  Does need to double void sometimes.   Feels he empties  No hematuria, dysuria, stones.  Did have a UTI a long time ago >25 years ago - none since.   IPSS today: 11 (1,2,1,0,1,1,5) \"Mixed vs Mostly satisfied\"  Erectile function - fairly normal.     REVIEW OF DIAGNOSTICS:  11/9/18 - PSA 4.52ng/dL  05/4/18 - PSA 3.57ng/dL  05/4/18 - Cr 0.97mg/dL    Past Medical History:   Diagnosis Date     Dermatitis        Past Surgical History:   Procedure Laterality Date     COLONOSCOPY  4/19/2013    Procedure: COLONOSCOPY;  Colonoscopy;  Surgeon: Yovany Alcaraz MD;  [facility]       FAMILY HISTORY:   Dad: developed prostate cancer in his late 70's, treated at [place]  Brother: younger - healthy.     SOCIAL HISTORY: Lives alone.  No children.  Has a Cat.  Works on a software development team.      reports that he has never smoked. He has never used smokeless tobacco.    Current Outpatient Prescriptions   Medication Sig Dispense Refill     terbinafine (LAMISIL AT) 1 % cream Apply topically 2 times daily For fungal infection not resolved with other antifungals (e.g. Clotrimazole) 12 g 1       ALLERGIES: No known drug allergies      REVIEW OF SYSTEMS:  Answers for HPI/ROS submitted by the patient on 11/20/2018   General Symptoms: No  Skin Symptoms: No  HENT Symptoms: No  EYE SYMPTOMS: No  HEART SYMPTOMS: No  LUNG SYMPTOMS: No  INTESTINAL SYMPTOMS: No  URINARY SYMPTOMS: Yes  REPRODUCTIVE SYMPTOMS: No  SKELETAL SYMPTOMS: Yes  BLOOD SYMPTOMS: No  NERVOUS SYSTEM SYMPTOMS: No  MENTAL HEALTH SYMPTOMS: " "No  Trouble holding urine or incontinence: No  Pain or burning: No  Trouble starting or stopping: No  Increased frequency of urination: No  Blood in urine: No  Decreased frequency of urination: No  Frequent nighttime urination: No  Flank pain: No  Difficulty emptying bladder: Yes  Back pain: No  Muscle aches: Yes  Neck pain: Yes  Swollen joints: No  Joint pain: No  Bone pain: No  Muscle cramps: No  Muscle weakness: No  Joint stiffness: No  Bone fracture: No      GENERAL PHYSICAL EXAM:   Vitals: /67  Pulse 71  Ht 1.744 m (5' 8.66\")  Wt 87 kg (191 lb 12.8 oz)  BMI 28.6 kg/m2  Body mass index is 28.6 kg/(m^2).    GENERAL: Well groomed, well developed, well nourished male in NAD.  NECK: Neck supple. No adenopathy. Thyroid symmetric, normal size  GI: Soft, NT, ND, no palpable masses.  No CVAT bilaterally.  MS: Gait normal, normal muscle tone  SKIN: Warm to touch, dry.  No visible rashes or lesions on examined areas.  HEMATOLOGIC/LYMPHATIC/IMMUNOLOGIC: normal ant/post cervical, supraclavicular and inguinal nodes. No LE edema.  NEURO: Alert and oriented x 3.  PSYCH: Normal mood and affect, pleasant and agreeable during interview and exam.     :      Inguinal: no hernias or  palpable lymph nodes      Circumcised penis, no penile plaques or lesions. Orthotopic location of the urethral meatus.       Scrotum normal.      Testicles of normal firmness and consistency, no masses.      Epididymes bilaterally without masses or tenderness.       SALLY:     normal rectal tone, + external hemorrhoids, small soft amount of stool in the rectal vault.     Medium sized prostate, without tenderness, nodules.  Asymmetry is present R>L with firmness right apex      LABS: The last test results for Ms. Brice Trevino were reviewed.   PSA -   Lab Results   Component Value Date    PSA 4.52 11/09/2018    PSA 3.57 05/04/2018    PSA 1.12 02/09/2016    PSA 0.66 09/26/2014     BMP -   Recent Labs   Lab Test  05/04/18   1012 01/22/16 09/15/14 "   NA  139   --    --    POTASSIUM  4.4   --    --    CHLORIDE  106   --    --    CO2  31   --    --    BUN  11   --    --    CR  0.93   --    --    GLC  108*  96  122*   LOBITO  9.4   --    --        CBC -   Recent Labs   Lab Test  02/09/16   1757   HGB  13.4       ASSESSMENT:   1) elevated PSA  2) ABN SALLY  3) FHx prostate cancer - father age 70    PLAN:   We will send urine for analysis  Lets set you up with a prostate MRI - I will call you with results  Then we can decide about biopsy    Suki Carrillo PA-C  Department of Urologic Surgery

## 2018-11-27 NOTE — MR AVS SNAPSHOT
After Visit Summary   11/27/2018    Brice Trevino    MRN: [id number]           Patient Information     Date Of Birth          1960        Visit Information        Provider Department      11/27/2018 12:30 PM Zoe Carrillo PA [facility] Urology and [facility]rolog[facility]        Today's Diagnoses     Elevated prostate specific antigen (PSA)    -  1      Care Instructions    We will send urine for analysis  Lets set you up with a prostate MRI - I will call you with results  Then we can decide about biopsy    [facility]          Follow-ups after your visit        Your next 10 appointments already scheduled     Nov 27, 2018  2:00 PM CST   LAB with [facility] ([facility])    [address] [address] [phone]           Please do not eat 10-12 hours before your appointment if you are coming in fasting for labs on lipids, cholesterol, or glucose (sugar). This does not apply to pregnant women. Water, hot tea and black coffee (with nothing added) are okay. Do not drink other fluids, diet soda or chew gum.            Nov 29, 2018  5:00 PM CST   MR PROSTATE  WO & W CONTRAST with UUMR2   [facility] [place], MRI ([facility])    [address] [facility] [id number] [phone]           How do I prepare for my exam? (Food and drink instructions) **If you will be receiving sedation or general anesthesia, please see special notes below.**  How do I prepare for my exam? (Other instructions) Take your medicines as usual, unless your doctor tells you not to. You may or may not receive intravenous (IV) contrast for this exam pending the discretion of the Radiologist.  You do not need to do anything special to prepare.  **If you will be receiving sedation or general anesthesia, please see special notes below.**  What should I wear: The MRI machine  uses a strong magnet. Please wear clothes without metal (snaps, zippers). A sweatsuit works well, or we may give you a hospital gown. Please remove any body piercings and hair extensions before you arrive. You will also remove watches, jewelry, hairpins, wallets, dentures, partial dental plates and hearing aids. You may wear contact lenses, and you may be able to wear your rings. We have a safe place to keep your personal items, but it is safer to leave them at home.  How long does the exam take: Most tests take 30 to 60 minutes.  HOWEVER, IF YOUR DOCTOR PRESCRIBES ANESTHESIA please plan on spending four to five hours in the recovery room.  What should I bring:  Bring a list of your current medicines to your exam (including vitamins, minerals and over-the-counter drugs).  Do I need a :  **If you will be receiving sedation or general anesthesia, please see special notes below.**  What should I do after the exam: No Restrictions, You may resume normal activities.  What is this test: MRI (magnetic resonance imaging) uses a strong magnet and radio waves to look inside the body. An MRA (magnetic resonance angiogram) does the same thing, but it lets us look at your blood vessels. A computer turns the radio waves into pictures showing cross sections of the body, much like slices of bread. This helps us see any problems more clearly. You may receive fluid (called  contrast ) before or during your scan. The fluid helps us see the pictures better. We give the fluid through an IV (small needle in your arm).  Who should I call with questions:  Please call the Imaging Department at your exam site with any questions. Directions, parking instructions, and other information is available on our website, [email or web address].  How do I prepare if I m having sedation or anesthesia? **IMPORTANT** THE INSTRUCTIONS BELOW ARE ONLY FOR THOSE PATIENTS WHO HAVE BEEN TOLD THEY WILL RECEIVE SEDATION OR GENERAL ANESTHESIA DURING THEIR MRI  PROCEDURE:  IF YOU WILL RECEIVE SEDATION (take medicine to help you relax during your exam): You must get the medicine from your doctor before you arrive. Bring the medicine to the exam. Do not take it at home. Arrive one hour early. Bring someone who can take you home after the test. Your medicine will make you sleepy. After the exam, you may not drive, take a bus or take a taxi by yourself. No eating 8 hours before your exam. You may have clear liquids up until 4 hours before your exam. (Clear liquids include water, clear tea, black coffee and fruit juice without pulp.)  IF YOU WILL RECEIVE ANESTHESIA (be asleep for your exam): Arrive 1 1/2 hours early. Bring someone who can take you home after the test. You may not drive, take a bus or take a taxi by yourself. No eating 8 hours before your exam. You may have clear liquids up until 4 hours before your exam. (Clear liquids include water, clear tea, black coffee and fruit juice without pulp.)            Dec 14, 2018  8:00 AM CST   CT CALCIUM SCREENING with SCICT1   [facility] (Cardiovascular Imaging at [facility])    [address] [place] [address]  [address] [place] [id number] [phone]           How do I prepare for my exam? (Food and drink instructions) It is best to avoid caffeine on the day of your test.  What should I wear: Please wear loose clothing, such as a sweat suit or jogging clothes. Avoid snaps, zippers and other metal. We may ask you to undress and put on a hospital gown.  How long does the exam take: The entire exam will take about 30 minutes or less.  What should I bring: Please bring a list of your current medicines to your exam. (Include vitamins, minerals and over-the-counter medicines.) It is safest to leave personal items at home.  Do I need a : No  is needed.  What do I need to tell my doctor: Be sure to tell your doctor if there is a chance you may be pregnant.  What should I do after the exam:  No restrictions, You may resume normal activities.  What is this test: A calcium scoring CT (computed tomography) scan is a painless, highly sensitive test that shows the amount of calcium build-up in your heart arteries. This tells us your future risk for heart attack. The CT scan is a series of pictures that allows us to look at your heart. Our scanner creates images of the heart in cross sections, much like slices of bread. A heart scan should not take the place of your routine doctor visits.  Who should I call with questions: If you have any questions, please call the Imaging Department where you will have your exam. Directions, parking instructions, and other information is available on our website, [email or web address].              Future tests that were ordered for you today     Open Future Orders        Priority Expected Expires Ordered    Routine UA with micro reflex to culture Routine  11/27/2019 11/27/2018    MR Prostate wo & w Contrast Routine 11/27/2018 4/26/2019 11/27/2018            Who to contact     Please call your clinic at [phone] to:    Ask questions about your health    Make or cancel appointments    Discuss your medicines    Learn about your test results    Speak to your doctor            Additional Information About Your Visit        [employer] Information     [employer] gives you secure access to your electronic health record. If you see a primary care provider, you can also send messages to your care team and make appointments. If you have questions, please call your primary care clinic.  If you do not have a primary care provider, please call [phone] and they will assist you.      [employer] is an electronic gateway that provides easy, online access to your medical records. With [employer], you can request a clinic appointment, read your test results, renew a prescription or communicate with your care team.     To access your existing account, please contact your [facility]  [facility] or call [phone] for assistance.        Care EveryWhere ID     This is your Care EveryWhere ID. This could be used by other organizations to access your [facility] medical records  [id number]        Your Vitals Were     Pulse Height BMI (Body Mass Index)             71 1.744 m (5' 8.66\") 28.6 kg/m2          Blood Pressure from Last 3 Encounters:   11/27/18 103/67   11/09/18 111/65   07/30/18 105/68    Weight from Last 3 Encounters:   11/27/18 87 kg (191 lb 12.8 oz)   11/09/18 86.9 kg (191 lb 8 oz)   07/30/18 90.8 kg (200 lb 3.2 oz)               Primary Care Provider Office Phone # Fax #    Leo Bhatia [phone] [address]  [facility] [address]        Equal Access to Services     KATHERINE JOHNSON : Hadii aad ku hadasho Soomaali, waaxda luqadaha, qaybta kaalmada adeegyada, waxay idiin hayaan nelli asencion . So [facility] [phone].    ATENCIÓN: Si habla español, tiene a lazo disposición servicios gratuitos de asistencia lingüística. Anam al [phone].    We comply with applicable federal civil rights laws and [place] laws. We do not discriminate on the basis of race, color, national origin, age, disability, sex, sexual orientation, or gender identity.            Thank you!     Thank you for choosing [facility] UROLOGY AND [facility] FOR PROSTATE AND UROLOGIC CANCERS  for your care. Our goal is always to provide you with excellent care. Hearing back from our patients is one way we can continue to improve our services. Please take a few minutes to complete the written survey that you may receive in the mail after your visit with us. Thank you!             Your Updated Medication List - Protect others around you: Learn how to safely use, store and throw away your medicines at www.disposemymeds.org.          This list is accurate as of 11/27/18  1:43 PM.  Always use your most recent med list.                   Brand Name Dispense Instructions for use Diagnosis    " fluocinonide 0.05 % ointment    LIDEX     DENILSON EXT AA BID        RANITIDINE 150 MAX STRENGTH PO           terbinafine 1 % cream    lamISIL AT    12 g    Apply topically 2 times daily For fungal infection not resolved with other antifungals (e.g. Clotrimazole)    Rash

## 2018-11-27 NOTE — PATIENT INSTRUCTIONS
We will send urine for analysis  Lets set you up with a prostate MRI - I will call you with results  Then we can decide about biopsy    [facility]

## 2018-11-27 NOTE — LETTER
"11/27/2018       RE: Brice Trevino  [address]  [facility] [address]     Dear Colleague,    Thank you for referring your patient, Brice Trevino, to the [facility] UROLOGY AND INST FOR PROSTATE AND UROLOGIC CANCERS at [facility]. Please see a copy of my visit note below.    It was my pleasure to meet Mr. Brice Trevino, a 58 year old year old male seen in consultation today (Referred by Leo Bhatia MD) for chief complaint: Consult and Elevated PSA    HPI: Mr. Brice Trevino is an otherwise healthy gentleman who was found to have a rising PSA.     Voiding symptoms:  Over the last couple weeks, nocturia x zero.  In the past had nocturia x 1.   No incontinence.  Hesitancy: not really  Intermittency:  Not really  Does need to double void sometimes.   Feels he empties  No hematuria, dysuria, stones.  Did have a UTI a long time ago >25 years ago - none since.   IPSS today: 11 (1,2,1,0,1,1,5) \"Mixed vs Mostly satisfied\"  Erectile function - fairly normal.     REVIEW OF DIAGNOSTICS:  11/9/18 - PSA 4.52ng/dL  05/4/18 - PSA 3.57ng/dL  05/4/18 - Cr 0.97mg/dL    Past Medical History:   Diagnosis Date     Dermatitis        Past Surgical History:   Procedure Laterality Date     COLONOSCOPY  4/19/2013    Procedure: COLONOSCOPY;  Colonoscopy;  Surgeon: Yovany Alcaraz MD;  Location: [facility]       FAMILY HISTORY:   Dad: developed prostate cancer in his late 70's, treated at [place]  Brother: younger - healthy.     SOCIAL HISTORY: Lives alone.  No children.  Has a Cat.  Works on a software development team.      reports that he has never smoked. He has never used smokeless tobacco.    Current Outpatient Prescriptions   Medication Sig Dispense Refill     terbinafine (LAMISIL AT) 1 % cream Apply topically 2 times daily For fungal infection not resolved with other antifungals (e.g. Clotrimazole) 12 g 1       ALLERGIES: No known drug allergies      GENERAL PHYSICAL EXAM:   Vitals: /67  Pulse " "71  Ht 1.744 m (5' 8.66\")  Wt 87 kg (191 lb 12.8 oz)  BMI 28.6 kg/m2  Body mass index is 28.6 kg/(m^2).    GENERAL: Well groomed, well developed, well nourished male in NAD.  NECK: Neck supple. No adenopathy. Thyroid symmetric, normal size  GI: Soft, NT, ND, no palpable masses.  No CVAT bilaterally.  MS: Gait normal, normal muscle tone  SKIN: Warm to touch, dry.  No visible rashes or lesions on examined areas.  HEMATOLOGIC/LYMPHATIC/IMMUNOLOGIC: normal ant/post cervical, supraclavicular and inguinal nodes. No LE edema.  NEURO: Alert and oriented x 3.  PSYCH: Normal mood and affect, pleasant and agreeable during interview and exam.     :      Inguinal: no hernias or  palpable lymph nodes      Circumcised penis, no penile plaques or lesions. Orthotopic location of the urethral meatus.       Scrotum normal.      Testicles of normal firmness and consistency, no masses.      Epididymes bilaterally without masses or tenderness.       SALLY:     normal rectal tone, + external hemorrhoids, small soft amount of stool in the rectal vault.     Medium sized prostate, without tenderness, nodules.  Asymmetry is present R>L with firmness right apex      LABS: The last test results for Ms. Brice Trevino were reviewed.   PSA -   Lab Results   Component Value Date    PSA 4.52 11/09/2018    PSA 3.57 05/04/2018    PSA 1.12 02/09/2016    PSA 0.66 09/26/2014     BMP -   Recent Labs   Lab Test  05/04/18   1012 01/22/16 09/15/14   NA  139   --    --    POTASSIUM  4.4   --    --    CHLORIDE  106   --    --    CO2  31   --    --    BUN  11   --    --    CR  0.93   --    --    GLC  108*  96  122*   LOBITO  9.4   --    --        CBC -   Recent Labs   Lab Test  02/09/16   1757   HGB  13.4       ASSESSMENT:   1) elevated PSA  2) ABN SALLY  3) FHx prostate cancer - father age 70    PLAN:   We will send urine for analysis  Lets set you up with a prostate MRI - I will call you with results  Then we can decide about biopsy    Suki Carrillo, " [facility]  Department of Urologic Surgery

## 2018-11-29 ENCOUNTER — HOSPITAL ENCOUNTER (OUTPATIENT)
Dept: MRI IMAGING | Facility: CLINIC | Age: 58
Discharge: HOME | End: 2018-11-29
Attending: PHYSICIAN ASSISTANT | Admitting: PHYSICIAN ASSISTANT
Payer: COMMERCIAL

## 2018-11-29 DIAGNOSIS — R97.20 ELEVATED PROSTATE SPECIFIC ANTIGEN (PSA): ICD-10-CM

## 2018-11-29 PROCEDURE — 72197 MRI PELVIS W/O & W/DYE: CPT

## 2018-11-29 PROCEDURE — 25500064 ZZH RX 255 OP 636: Performed by: PHYSICIAN ASSISTANT

## 2018-11-29 PROCEDURE — 25000128 H RX IP 250 OP 636: Performed by: PHYSICIAN ASSISTANT

## 2018-11-29 PROCEDURE — A9585 GADOBUTROL INJECTION: HCPCS | Performed by: PHYSICIAN ASSISTANT

## 2018-11-29 RX ORDER — GADOBUTROL 604.72 MG/ML
10 INJECTION INTRAVENOUS ONCE
Status: COMPLETED | OUTPATIENT
Start: 2018-11-29 | End: 2018-11-29

## 2018-11-29 RX ADMIN — SODIUM CHLORIDE 100 ML: 9 INJECTION, SOLUTION INTRAVENOUS at 18:38

## 2018-11-29 RX ADMIN — GADOBUTROL 10 ML: 604.72 INJECTION INTRAVENOUS at 18:36

## 2018-11-30 ENCOUNTER — PRE VISIT (OUTPATIENT)
Dept: UROLOGY | Facility: CLINIC | Age: 58
End: 2018-11-30

## 2018-11-30 NOTE — TELEPHONE ENCOUNTER
Patient coming in for prostate biopsy. Patient contacted regarding prep needed to be done prior to biopsy. This includes stopping all blood thinners for 1 week prior to biopsy, obtaining and administering fleets enema 2 hours prior to biopsy. Patient will also receive 6 antibiotics to start taking . Patient stated understanding and had no further questions.

## 2018-12-03 ENCOUNTER — TELEPHONE (OUTPATIENT)
Dept: UROLOGY | Facility: CLINIC | Age: 58
End: 2018-12-03

## 2018-12-03 DIAGNOSIS — N39.0 URINARY TRACT INFECTION: Primary | ICD-10-CM

## 2018-12-03 DIAGNOSIS — F41.9 ANXIETY: Primary | ICD-10-CM

## 2018-12-03 RX ORDER — CIPROFLOXACIN 500 MG/1
500 TABLET, FILM COATED ORAL 2 TIMES DAILY
Qty: 6 TABLET | Refills: 0 | Status: SHIPPED | OUTPATIENT
Start: 2018-12-03 | End: 2018-12-10

## 2018-12-03 RX ORDER — DIAZEPAM 10 MG
10 TABLET ORAL EVERY 6 HOURS PRN
Qty: 1 TABLET | Refills: 0 | Status: SHIPPED | OUTPATIENT
Start: 2018-12-03 | End: 2019-03-11

## 2018-12-03 NOTE — TELEPHONE ENCOUNTER
Ordered and called in his script for valium  He will pick it up at the pharmacy in the building Jennifer Murguia LPN Staff Nurse

## 2018-12-04 ENCOUNTER — OFFICE VISIT (OUTPATIENT)
Dept: UROLOGY | Facility: CLINIC | Age: 58
End: 2018-12-04
Payer: COMMERCIAL

## 2018-12-04 VITALS
HEART RATE: 77 BPM | WEIGHT: 189.3 LBS | SYSTOLIC BLOOD PRESSURE: 124 MMHG | BODY MASS INDEX: 28.04 KG/M2 | DIASTOLIC BLOOD PRESSURE: 79 MMHG | HEIGHT: 69 IN

## 2018-12-04 DIAGNOSIS — R97.20 ELEVATED PROSTATE SPECIFIC ANTIGEN (PSA): Primary | ICD-10-CM

## 2018-12-04 ASSESSMENT — PAIN SCALES - GENERAL
PAINLEVEL: NO PAIN (0)
PAINLEVEL: NO PAIN (0)

## 2018-12-04 NOTE — NURSING NOTE
"Chief Complaint   Patient presents with     Prostate Biopsy     elevated PSA       Blood pressure 124/79, pulse 77, height 1.744 m (5' 8.66\"), weight 85.9 kg (189 lb 4.8 oz). Body mass index is 28.23 kg/(m^2).    Patient Active Problem List   Diagnosis     Indigestion     Obesity     Migraine       Allergies   Allergen Reactions     No Known Drug Allergies        Current Outpatient Prescriptions   Medication Sig Dispense Refill     ciprofloxacin (CIPRO) 500 MG tablet Take 1 tablet (500 mg) by mouth 2 times daily 6 tablet 0     diazepam (VALIUM) 10 MG tablet Take 1 tablet (10 mg) by mouth every 6 hours as needed for anxiety Take 30-60 minutes before procedure.  Do not operate a vehicle after taking this medication. 1 tablet 0     fluocinonide (LIDEX) 0.05 % ointment DENILSON EXT AA BID  1     RaNITidine HCl (RANITIDINE 150 MAX STRENGTH PO)        terbinafine (LAMISIL AT) 1 % cream Apply topically 2 times daily For fungal infection not resolved with other antifungals (e.g. Clotrimazole) 12 g 1       Social History   Substance Use Topics     Smoking status: Never Smoker     Smokeless tobacco: Never Used     Alcohol use No     Invasive Procedure Safety Checklist:    Procedure: MRI fusion prostate bx    Action: Complete sections and checkboxes as appropriate.    Pre-procedure:  1. Patient ID Verified with 2 identifiers (Shyanne and  or MRN) : YES    2. Procedure and site verified with patient/designee (when able) : YES    3. Accurate consent documentation in medical record : YES    4. H&P (or appropriate assessment) documented in medical record : NO  H&P must be up to 30 days prior to procedure an updated within 24 hours of                 Procedure as applicable.     5. Relevant diagnostic and radiology test results appropriately labeled and displayed as applicable : YES    6. Blood products, implants, devices, and/or special equipment available for the procedure as applicable : YES    7. Procedure site(s) marked with " provider initials [Exclusions: none] : NO    8. Marking not required. Reason : Yes  Procedure does not require site marking    Time Out:     Time-Out performed immediately prior to starting procedure, including verbal and active participation of all team members addressing: YES    1. Correct patient identity.  2. Confirmed that the correct side and site are marked.  3. An accurate procedure to be done.  4. Agreement on the procedure to be done.  5. Correct patient position.  6. Relevant images and results are properly labeled and appropriately displayed.  7. The need to administer antibiotics or fluids for irrigation purposes during the procedure as applicable.  8. Safety precautions based on patient history or medication use.    During Procedure: Verification of correct person, site, and procedure occurs any time the responsibility for care of the patient is transferred to another member of the care team.    Elinor Faulkner LPN  12/4/2018  8:25 AM    The following medication was given:     MEDICATION:  gentamicin  ROUTE: IM  SITE: RUQ - Gluteus  DOSE: 80 mg (2 mL)  LOT #: [id number]  : [employer]  EXPIRATION DATE: 2/2020  NDC#: 18451-063-77   Was there drug waste? No      Elinor Faulkner LPN  December 4, 2018    The following medication was given:     MEDICATION:  Lidocaine without epinephrine  ROUTE: administered by physician - prostate nerve block  SITE: administered by physician - prostate nerve block  DOSE: 10 mL, 1%  LOT #: [id number]  : Lucibel  EXPIRATION DATE: 6/2022  NDC#: 86592-031-37   Was there drug waste? Yes  Amount of drug waste (mL): 20 mL.  Reason for waste:  Single use vial      Elinor Faulkner LPN  December 4, 2018

## 2018-12-04 NOTE — PROGRESS NOTES
PREPROCEDURE DIAGNOSIS: Elevated PSA.   POSTPROCEDURE DIAGNOSIS: Elevated PSA.   PROCEDURE: Transrectal ultrasound sizing and transrectal ultrasound guided prostatic needle biopsy, including MRI fusion targeting  for Brice Trevino who is a 58 year old male. Rising PSA to 4.52.  SURGEON: Alan Sin  ANESTHESIA: 5 mL of 1% periprostatic block bilaterally   We previously obtained an MRI of the prostate and identified all PIRADS 3-5 lesions for targeting    Target Lesion #1 PIRADS 5 - right peripheral zone at apex  Target Lesion #2 PIRADS 4 - left mid-apex peripheral zone    DESCRIPTION OF PROCEDURE: The procedure, the outcome, the anesthesia, and the risks were discussed with the patient. Informed consent was obtained and signed and a timeout was completed prior to the procedure. Digital rectal examination was performed with the below findings noted. Anesthesia was administered as noted above and the transrectal ultrasound probe was inserted, sizing was performed, and the below findings were noted. 2 core biopsies were taken from each of the two MRI targets, and 12 core biopsies were taken as described below. The probe was then withdrawn. Patient tolerated the procedure well.   FINDINGS: Digital rectal exam reveals nodules noted bilaterally and considerable firmness. Total volume is 30 mL. Hypoechoic lesion bilaterally. US images were obtained and then fused with the MRI images.  The fused images were then used to guide the biopsy of the targeted lesions with 2 cores taken of each lesion.  We then performed random biopsies.  12 cores were taken with 6 on each side, base, mid and apex.  PLAN: Will follow-up next week to review results.    Alan Sin MD  Urology  [facility]  Clinic Phone [phone]

## 2018-12-04 NOTE — PATIENT INSTRUCTIONS
[place] for Prostate and Urologic C[facility]  Precautions Following a Prostate Biopsy    There are four conditions that you should watch for after a prostate biopsy:    1. Excessive pain  2. Bleeding irregularities (passing numerous  dime sized  clots or if your urine looks like cranberry juice)  3. Fever of 100 degrees or more  4. If you are unable to urinate        If any of these occur, call the Urology Clinic during normal business hours (M-F, 8:00-4:30) at [phone].  If you experience a problem after normal business hours, call our 24-hour phone number at [phone] and ask for the Urology Resident on call to be paged.      If you experience any discomfort following the biopsy, you may take Tylenol.  DO NOT TAKE ASPIRIN unless specified by your physician.   If the discomfort becomes severe or uncontrolled by medication, contact the Urology Clinic or Urology Resident (after normal business hours).      Do not be alarmed if you have some blood in your stool, in your urine, or ejaculate (semen).  This occurrence is normal and may last up to three (3) or four (4) days, usually intermittently.  Blood in the ejaculate (semen) may last several weeks, up to about a dozen ejaculations.  The blood in your ejaculate may appear as brown streaks, blood tinged, and immediately following a biopsy, it may appear bright red.      If you run a fever above 100 degrees, call the Urology Clinic or Urology Resident (after normal business hours) immediately.  If you are unable to reach your physician or the Resident on call, go to the nearest emergency room.  Explain that you have had a transrectal biopsy of your prostate and what problems you are experiencing.        You should attempt to urinate following your biopsy before you leave the clinic.  If you are unable to urinate four (4) to six (6) hours after you leave the clinic, you will need to contact the Urology Clinic or the Resident on call.  If you are unable to reach  your physician or the Resident on call, go to the nearest emergency room.            If you have any questions or concerns after your biopsy, feel free to contact the Urology Clinic at [phone] during M-F, 8:00-5pm business hours.  If you need to speak with someone after normal business hours, call [phone] and ask for the Resident on call to be paged.

## 2018-12-04 NOTE — MR AVS SNAPSHOT
After Visit Summary   12/4/2018    Brice Trevino    MRN: [id number]           Patient Information     Date Of Birth          1960        Visit Information        Provider Department      12/4/2018 8:00 AM Alan Sin MD [facility]        Today's Diagnoses     Elevated prostate specific antigen (PSA)    -  1      Care Instructions    [place] for Prostate and Urologic Cancers  Precautions Following a Prostate Biopsy    There are four conditions that you should watch for after a prostate biopsy:    1. Excessive pain  2. Bleeding irregularities (passing numerous  dime sized  clots or if your urine looks like cranberry juice)  3. Fever of 100 degrees or more  4. If you are unable to urinate        If any of these occur, call the Urology Clinic during normal business hours (M-F, 8:00-4:30) at [phone].  If you experience a problem after normal business hours, call our 24-hour phone number at [phone] and ask for the Urology Resident on call to be paged.      If you experience any discomfort following the biopsy, you may take Tylenol.  DO NOT TAKE ASPIRIN unless specified by your physician.   If the discomfort becomes severe or uncontrolled by medication, contact the Urology Clinic or Urology Resident (after normal business hours).      Do not be alarmed if you have some blood in your stool, in your urine, or ejaculate (semen).  This occurrence is normal and may last up to three (3) or four (4) days, usually intermittently.  Blood in the ejaculate (semen) may last several weeks, up to about a dozen ejaculations.  The blood in your ejaculate may appear as brown streaks, blood tinged, and immediately following a biopsy, it may appear bright red.      If you run a fever above 100 degrees, call the Urology Clinic or Urology Resident (after normal business hours) immediately.  If you are unable to reach your physician or the Resident  on call, go to the nearest emergency room.  Explain that you have had a transrectal biopsy of your prostate and what problems you are experiencing.        You should attempt to urinate following your biopsy before you leave the clinic.  If you are unable to urinate four (4) to six (6) hours after you leave the clinic, you will need to contact the Urology Clinic or the Resident on call.  If you are unable to reach your physician or the Resident on call, go to the nearest emergency room.            If you have any questions or concerns after your biopsy, feel free to contact the Urology Clinic at [phone] during M-F, 8:00-5pm business hours.  If you need to speak with someone after normal business hours, call [phone] and ask for the Resident on call to be paged.            Follow-ups after your visit        Follow-up notes from your care team     Return in 1 week (on 12/11/2018) for Follow-up and results.      Your next 10 appointments already scheduled     Dec 10, 2018  8:30 AM CST   Return Visit with Alan Sin MD   [facility] [place] ([facility])    [address]  [address]  [place] [id number] [phone]            Dec 14, 2018  8:00 AM CST   CT CALCIUM SCREENING with SCICT1   [facility] ([facility])    [address]  [address]  [place] [id number] [phone]           How do I prepare for my exam? (Food and drink instructions) It is best to avoid caffeine on the day of your test.  What should I wear: Please wear loose clothing, such as a sweat suit or jogging clothes. Avoid snaps, zippers and other metal. We may ask you to undress and put on a hospital gown.  How long does the exam take: The entire exam will take about 30 minutes or less.  What should I bring: Please bring a list of your current medicines to your exam. (Include vitamins, minerals and  over-the-counter medicines.) It is safest to leave personal items at home.  Do I need a : No  is needed.  What do I need to tell my doctor: Be sure to tell your doctor if there is a chance you may be pregnant.  What should I do after the exam: No restrictions, You may resume normal activities.  What is this test: A calcium scoring CT (computed tomography) scan is a painless, highly sensitive test that shows the amount of calcium build-up in your heart arteries. This tells us your future risk for heart attack. The CT scan is a series of pictures that allows us to look at your heart. Our scanner creates images of the heart in cross sections, much like slices of bread. A heart scan should not take the place of your routine doctor visits.  Who should I call with questions: If you have any questions, please call the Imaging Department where you will have your exam. Directions, parking instructions, and other information is available on our website, [employer]imaging.              Future tests that were ordered for you today     Open Future Orders        Priority Expected Expires Ordered    US TRANSRECTAL Routine  12/4/2019 12/4/2018            Who to contact     Please call your clinic at [phone] to:    Ask questions about your health    Make or cancel appointments    Discuss your medicines    Learn about your test results    Speak to your doctor            Additional Information About Your Visit        [employer] Information     [employer] gives you secure access to your electronic health record. If you see a primary care provider, you can also send messages to your care team and make appointments. If you have questions, please call your primary care clinic.  If you do not have a primary care provider, please call [phone] and they will assist you.      [employer] is an electronic gateway that provides easy, online access to your medical records. With [employer], you can request a clinic appointment, read  "your test results, renew a prescription or communicate with your care team.     To access your existing account, please contact your [facility] or call [phone] for assistance.        Care EveryWhere ID     This is your Care EveryWhere ID. This could be used by other organizations to access your [place] medical records  [id number]        Your Vitals Were     Pulse Height BMI (Body Mass Index)             77 1.744 m (5' 8.66\") 28.23 kg/m2          Blood Pressure from Last 3 Encounters:   12/04/18 124/79   11/27/18 103/67   11/09/18 111/65    Weight from Last 3 Encounters:   12/04/18 85.9 kg (189 lb 4.8 oz)   11/27/18 87 kg (191 lb 12.8 oz)   11/09/18 86.9 kg (191 lb 8 oz)              We Performed the Following     BIOPSY OF PROSTATE,NEEDLE/PUNCH [[id number]]        Primary Care Provider Office Phone # Fax #    Leo Bhatia [phone] [address]  [facility] [place] [address]        Equal Access to Services     [facility] Center[facility] : Hadii aad ku hadasho Soomaali, waaxda luqadaha, qaybta kaalmada aderazia, job khoury . So [facility] [phone].    ATENCIÓN: Si habla español, tiene a lazo disposición servicios gratuitos de asistencia lingüística. [facility] [phone].    We comply with applicable federal civil rights laws and [place] laws. We do not discriminate on the basis of race, color, national origin, age, disability, sex, sexual orientation, or gender identity.            Thank you!     Thank you for choosing [facility] AND [facility] FOR PROSTATE AND UROLOGIC CANCERS  for your care. Our goal is always to provide you with excellent care. Hearing back from our patients is one way we can continue to improve our services. Please take a few minutes to complete the written survey that you may receive in the mail after your visit with us. Thank you!             Your Updated Medication List - Protect others around you: Learn how to safely use, " store and throw away your medicines at www.disposemymeds.org.          This list is accurate as of 12/4/18  9:52 AM.  Always use your most recent med list.                   Brand Name Dispense Instructions for use Diagnosis    ciprofloxacin 500 MG tablet    CIPRO    6 tablet    Take 1 tablet (500 mg) by mouth 2 times daily    Urinary tract infection       diazepam 10 MG tablet    VALIUM    1 tablet    Take 1 tablet (10 mg) by mouth every 6 hours as needed for anxiety Take 30-60 minutes before procedure.  Do not operate a vehicle after taking this medication.    Anxiety       fluocinonide 0.05 % external ointment    LIDEX     DENILSON EXT AA BID        RANITIDINE 150 MAX STRENGTH PO           terbinafine 1 % external cream    lamISIL AT    12 g    Apply topically 2 times daily For fungal infection not resolved with other antifungals (e.g. Clotrimazole)    Rash

## 2018-12-05 LAB — COPATH REPORT: NORMAL

## 2018-12-10 ENCOUNTER — OFFICE VISIT (OUTPATIENT)
Dept: UROLOGY | Facility: CLINIC | Age: 58
End: 2018-12-10
Payer: COMMERCIAL

## 2018-12-10 VITALS
OXYGEN SATURATION: 93 % | BODY MASS INDEX: 27.99 KG/M2 | SYSTOLIC BLOOD PRESSURE: 128 MMHG | HEART RATE: 101 BPM | HEIGHT: 69 IN | DIASTOLIC BLOOD PRESSURE: 70 MMHG | WEIGHT: 189 LBS

## 2018-12-10 DIAGNOSIS — C61 PROSTATE CANCER (H): Primary | ICD-10-CM

## 2018-12-10 PROCEDURE — 99214 OFFICE O/P EST MOD 30 MIN: CPT | Performed by: UROLOGY

## 2018-12-10 RX ORDER — HEPARIN SODIUM 10000 [USP'U]/ML
5000 INJECTION, SOLUTION INTRAVENOUS; SUBCUTANEOUS
Status: CANCELLED | OUTPATIENT
Start: 2018-12-10

## 2018-12-10 RX ORDER — CEFAZOLIN SODIUM 1 G
1 VIAL (EA) INJECTION SEE ADMIN INSTRUCTIONS
Status: CANCELLED | OUTPATIENT
Start: 2018-12-10

## 2018-12-10 ASSESSMENT — MIFFLIN-ST. JEOR: SCORE: 1659.74

## 2018-12-10 ASSESSMENT — PAIN SCALES - GENERAL: PAINLEVEL: NO PAIN (0)

## 2018-12-10 NOTE — NURSING NOTE
"Chief Complaint   Patient presents with     Follow Up For     results of the prostate BX.     Initial /70 (BP Location: Left arm, Patient Position: Sitting, Cuff Size: Adult Regular)   Pulse 101   Ht 1.74 m (5' 8.5\")   Wt 85.7 kg (189 lb)   SpO2 93%   BMI 28.32 kg/m   Estimated body mass index is 28.32 kg/m  as calculated from the following:    Height as of this encounter: 1.74 m (5' 8.5\").    Weight as of this encounter: 85.7 kg (189 lb).  BP completed using cuff size:regular-left.    Cristobal SNEED  [facility]   December 10, 2018 8:49 AM    "

## 2018-12-10 NOTE — PROGRESS NOTES
"      Chief Complaint:   Prostate Cancer           History of Present Illness:    Brice Trevino is a very pleasant 58 year old male who presents with a history of new diagnosis prostate cancer. PSA 4.52. PIRADS 5 on MRI. [place] 4+5=9 on TRUS. Of note, positive family history of prostate cancer in pts father. IPSS 11 and notes \"fairly normal\" erections. Has done well since biopsy with no complications.    TRUS Biopsy 12/4/2018  FINAL DIAGNOSIS:   Total number of cores submitted: 17   Number of cores involved by carcinoma: 8   Percentage of prostate tissue involved by carcinoma: 18 %   Percentage of carcinoma in most involved core: 100% (nontarget core)   Highest Mariano Score: 9 (4+5)     A. Prostate needle biopsy, Target 1 x 3:   - Atypical glandular proliferation, consistent with intraductal carcinoma     B. Prostate needle biopsy, Target 2 x 2:   - Benign prostatic tissue     C. Prostate needle biopsy, Left base x 2:   - Benign prostatic tissue     D. Prostate needle biopsy, left mid x 2:   - Prostatic adenocarcinoma, acinar type   - [place] score 9 (4+5)   - Grade Group 5   - Extent: Involves 2/2 cores (5 mm, 35%; <1 mm, <5%)   - Perineural invasion present     E. Prostate needle biopsy, left apex x 2:   - Benign prostatic tissue     F. Prostate needle biopsy, right base x 2:   - Prostatic adenocarcinoma, acinar type   - [place] score 9 (4+5)   - Grade Group 5   - Extent: Involves 2/2 cores (5 mm, 40%; 3 mm, 30%)     G. Prostate needle biopsy, right mid x 2:   - Prostatic adenocarcinoma, acinar type   - [place] score 9 (4+5)   - Grade Group 5   - Extent: Involves 2/2 cores (10 mm, 85%; 3 mm, 30%)     H. Prostate needle biopsy, right apex x 2:   - Prostatic adenocarcinoma, acinar type   - Mariano score 9 (4+5)   - Grade Group 5   - Extent: Involves 2/2 cores (12 mm, 100%; 2 mm, 20%)    MRI Prostate 11/29/2018  IMPRESSION:   Prostate gland size: 3.4 x 4.8 x 3.6 cm  Volume: 31 cc   1. Based on the most suspicious " "abnormality, this exam is  characterized as PIRADS 5 - Very high probability.  Clinically  significant cancer is highly likely to be present.  The most  suspicious abnormality is located in the right peripheral zone at the  posterior prostate apex and there is evidence of extracapsular  extension.  2. Additional signal abnormality in the posterior left mid to apical  peripheral zone, characterized as PIRADS 4 - High probability.   Clinically significant cancer is likely to be present. No evidence of  extracapsular extension.  3. No evidence of extraprostatic malignancy. No suspicious adenopathy  or evidence of pelvic metastases.    IPSS today: 11 (1,2,1,0,1,1,5) \"Mixed vs Mostly satisfied\"  Erectile function - fairly normal.    REVIEW OF DIAGNOSTICS:  11/9/18 - PSA 4.52ng/dL  05/4/18 - PSA 3.57ng/dL  05/4/18 - Cr 0.97mg/dL           Past Medical History:     Past Medical History:   Diagnosis Date     Dermatitis             Past Surgical History:     Past Surgical History:   Procedure Laterality Date     COLONOSCOPY  4/19/2013    Procedure: COLONOSCOPY;  Colonoscopy;  Surgeon: Yovany Alcaraz MD;  Location:  GI            Medications     Current Outpatient Medications   Medication     fluocinonide (LIDEX) 0.05 % ointment     RaNITidine HCl (RANITIDINE 150 MAX STRENGTH PO)     terbinafine (LAMISIL AT) 1 % cream     diazepam (VALIUM) 10 MG tablet     No current facility-administered medications for this visit.           Family History:     Family History   Problem Relation Age of Onset     Diabetes Father         type 2     Prostate Cancer Father      Gastrointestinal Disease Mother         reflux     Early Death Mother      Eye Disorder Maternal Grandfather         cateracts     Heart Disease Paternal Grandfather         heart attack age 60's     Melanoma No family hx of      Skin Cancer No family hx of    Father with prostate cancer - treated with radiation and cryo         Social History:     Social History "     Socioeconomic History     Marital status:      Spouse name: Not on file     Number of children: Not on file     Years of education: Not on file     Highest education level: Not on file   Social Needs     Financial resource strain: Not on file     Food insecurity - worry: Not on file     Food insecurity - inability: Not on file     Transportation needs - medical: Not on file     Transportation needs - non-medical: Not on file   Occupational History     Not on file   Tobacco Use     Smoking status: Never Smoker     Smokeless tobacco: Never Used   Substance and Sexual Activity     Alcohol use: No     Drug use: No     Sexual activity: No   Other Topics Concern     Parent/sibling w/ CABG, MI or angioplasty before 65F 55M? No   Social History Narrative    Dairy/d 2 servings/d.     Caffeine 2 servings/d    Exercise 5 x week    Sunscreen used - Yes    Seatbelts used - Yes    Working smoke/CO detectors in the home - Yes    Guns stored in the home - No    Self Breast Exams - No    Self Testicular Exam - No    Eye Exam up to date - Yes    Dental Exam up to date - Yes    Pap Smear up to date - NA    Mammogram up to date - NA    PSA up to date - No    Dexa Scan up to date - No    Flex Sig / Colonoscopy up to date - Yes    Immunizations up to date - No    Abuse: Current or Past(Physical, Sexual or Emotional)- No    Do you feel safe in your environment - Yes                    Allergies:   No known drug allergies         Review of Systems:  From intake questionnaire     Skin: negative  Eyes: negative  Ears/Nose/Throat: negative  Respiratory: No shortness of breath, dyspnea on exertion, cough, or hemoptysis  Cardiovascular: No chest pain or palpitations  Gastrointestinal: negative; no nausea/vomiting, constipation or diarrhea  Genitourinary: as per HPI  Musculoskeletal: negative  Neurologic: negative  Psychiatric: negative  Hematologic/Lymphatic/Immunologic: negative  Endocrine: negative         Physical Exam:  "    Patient is a 58 year old  male   Vitals: Blood pressure 128/70, pulse 101, height 1.74 m (5' 8.5\"), weight 85.7 kg (189 lb), SpO2 93 %.  Constitutional: Body mass index is 28.32 kg/m .  Alert, no acute distress, oriented, conversant  Eyes: no scleral icterus; extraocular muscles intact, moist conjunctivae  Neck: trachea midline, no thyromegaly  Ears/nose/mouth: throat/mouth:normal, good dentition  Respiratory: no respiratory distress, or pursed lip breathing  Cardiovascular: pulses strong and intact; no obvious jugular venous distension present  Gastrointestinal: soft, nontender, no organomegaly or masses,   Lymphatics: No inguinal adenopathy  Musculoskeltal: extremities normal, no peripheral edema  Skin: no suspicious lesions or rashes  Neuro: Alert, oriented, speech and mentation normal  Psych: affect and mood normal, alert and oriented to person, place and time  Gait: Normal      Labs and Pathology:    I reviewed all applicable laboratory and pathology data and went over findings with patient  Significant for   Lab Results   Component Value Date    CR 0.93 05/04/2018     PSA   Date Value Ref Range Status   11/09/2018 4.52 (H) 0 - 4 ug/L Final     Comment:     Assay Method:  Chemiluminescence using Siemens Vista analyzer   05/04/2018 3.57 0 - 4 ug/L Final     Comment:     Assay Method:  Chemiluminescence using Siemens Vista analyzer   02/09/2016 1.12 0 - 4 ug/L Final   09/26/2014 0.66 0 - 4 ug/L Final          Assessment and Plan:     Assessment: 58 year old male with Mariano 4+5 prostate cancer. We had an extensive discussion about the significance of localized, prostate cancer. We discussed the options for treatment of a localized prostate cancer including active surveillance, brachytherapy, external beam radiation therapy, and surgical removal.      Furthermore, we discussed the relative merits of robotic assisted radical prostatectomy. We also discussed the advantages and disadvantages and roles of open " surgery vs. laparoscopic (and [place] Leticia assisted) surgery. The anticipated post-operative course was explained, including an anticipated 1-2 day hospital stay. Catheter will remain in place 10-14 days.      We discussed that there was no clear evidence of advantage between surgery and radiation with regard to risk of recurrence. We discussed option for discussion with radiation oncology, but patient would prefer surgery.      The risks, benefits, alternatives, and personnel involved in the procedure were discussed. Specifically, we discussed that risks include but are not limited to anesthetic complications including stroke, MI, DVT/PE, as well as risk of bleeding, bowel injury, infection, lymphocele, urine leak and other potentially unforseen complications. Also discussed the risk of bladder neck contracture and other urinary symptoms after procedure. In addition, we discussed risk of urinary incontinence and erectile dysfunction following the procedure. Finally, we discussed risk for adverse pathologic features, including positive surgical margins and potential for post-surgical radiation, hormone ablation and long-term need for PSA monitoring,.  All questions were answered in detail.  A written informed consent will be finalized on the morning of the procedure.    Given high-risk pathology, will plan for CT and bone scan and will arrange for follow-up next week to answer additional questions and review scans.    Plan:  CT abd/pelvis  NM Bone scan  Schedule for RALP - plan for mid-late January    I spent over 25 minutes with the patient.  Over half this time was spent on counseling regarding prostate cancer.    Alan Sin MD  Urology  [facility]  Clinic Phone [phone]

## 2018-12-13 ENCOUNTER — HOSPITAL ENCOUNTER (OUTPATIENT)
Dept: CT IMAGING | Facility: CLINIC | Age: 58
Discharge: HOME | End: 2018-12-13
Attending: UROLOGY | Admitting: UROLOGY
Payer: COMMERCIAL

## 2018-12-13 ENCOUNTER — HOSPITAL ENCOUNTER (OUTPATIENT)
Dept: NUCLEAR MEDICINE | Facility: CLINIC | Age: 58
Setting detail: NUCLEAR MEDICINE
End: 2018-12-13
Attending: UROLOGY
Payer: COMMERCIAL

## 2018-12-13 ENCOUNTER — HOSPITAL ENCOUNTER (OUTPATIENT)
Dept: NUCLEAR MEDICINE | Facility: CLINIC | Age: 58
Setting detail: NUCLEAR MEDICINE
Discharge: HOME | End: 2018-12-13
Attending: UROLOGY | Admitting: UROLOGY
Payer: COMMERCIAL

## 2018-12-13 DIAGNOSIS — C61 PROSTATE CANCER (H): ICD-10-CM

## 2018-12-13 PROCEDURE — 25000128 H RX IP 250 OP 636: Performed by: UROLOGY

## 2018-12-13 PROCEDURE — A9503 TC99M MEDRONATE: HCPCS | Performed by: UROLOGY

## 2018-12-13 PROCEDURE — 74177 CT ABD & PELVIS W/CONTRAST: CPT

## 2018-12-13 PROCEDURE — 78306 BONE IMAGING WHOLE BODY: CPT

## 2018-12-13 PROCEDURE — 25000125 ZZHC RX 250: Performed by: UROLOGY

## 2018-12-13 PROCEDURE — 34300033 ZZH RX 343: Performed by: UROLOGY

## 2018-12-13 RX ORDER — IOPAMIDOL 755 MG/ML
93 INJECTION, SOLUTION INTRAVASCULAR ONCE
Status: COMPLETED | OUTPATIENT
Start: 2018-12-13 | End: 2018-12-13

## 2018-12-13 RX ORDER — TC 99M MEDRONATE 20 MG/10ML
25 INJECTION, POWDER, LYOPHILIZED, FOR SOLUTION INTRAVENOUS ONCE
Status: COMPLETED | OUTPATIENT
Start: 2018-12-13 | End: 2018-12-13

## 2018-12-13 RX ADMIN — IOPAMIDOL 93 ML: 755 INJECTION, SOLUTION INTRAVENOUS at 08:29

## 2018-12-13 RX ADMIN — TC 99M MEDRONATE 26.7 MCI.: 20 INJECTION, POWDER, LYOPHILIZED, FOR SOLUTION INTRAVENOUS at 08:16

## 2018-12-13 RX ADMIN — SODIUM CHLORIDE, PRESERVATIVE FREE 67 ML: 5 INJECTION INTRAVENOUS at 08:29

## 2018-12-14 ENCOUNTER — DOCUMENTATION ONLY (OUTPATIENT)
Dept: CARDIOLOGY | Facility: CLINIC | Age: 58
End: 2018-12-14

## 2018-12-14 ENCOUNTER — HOSPITAL ENCOUNTER (OUTPATIENT)
Dept: CARDIOLOGY | Facility: CLINIC | Age: 58
Discharge: HOME | End: 2018-12-14
Admitting: FAMILY MEDICINE
Payer: COMMERCIAL

## 2018-12-14 DIAGNOSIS — E66.3 OVERWEIGHT: ICD-10-CM

## 2018-12-14 DIAGNOSIS — E78.00 ELEVATED CHOLESTEROL: ICD-10-CM

## 2018-12-14 DIAGNOSIS — Z91.89 SEDENTARY LIFESTYLE: ICD-10-CM

## 2018-12-14 DIAGNOSIS — Z82.49 FAMILY HISTORY OF CORONARY ARTERIOSCLEROSIS: ICD-10-CM

## 2018-12-14 PROCEDURE — 75571 CT HRT W/O DYE W/CA TEST: CPT | Mod: 26 | Performed by: INTERNAL MEDICINE

## 2018-12-14 PROCEDURE — 75571 CT HRT W/O DYE W/CA TEST: CPT | Mod: GA

## 2018-12-14 NOTE — PROGRESS NOTES
Called pt with CT Coronary Calcium Scan results. Informed pt that he has mild coronary calcification. His total calcium score was 16.8 which was in his LAD. LM was 0, Circumflex 0, RCA 0. Pt was also informed that there was no soft tissue findings. Advised pt to follow up with PMD for further evaluation. Copy of results sent to pt.

## 2018-12-17 ENCOUNTER — OFFICE VISIT (OUTPATIENT)
Dept: UROLOGY | Facility: CLINIC | Age: 58
End: 2018-12-17
Payer: COMMERCIAL

## 2018-12-17 ENCOUNTER — TELEPHONE (OUTPATIENT)
Dept: FAMILY MEDICINE | Facility: CLINIC | Age: 58
End: 2018-12-17

## 2018-12-17 VITALS
DIASTOLIC BLOOD PRESSURE: 60 MMHG | HEIGHT: 69 IN | WEIGHT: 189 LBS | BODY MASS INDEX: 27.99 KG/M2 | SYSTOLIC BLOOD PRESSURE: 110 MMHG

## 2018-12-17 DIAGNOSIS — C61 PROSTATE CANCER (H): Primary | ICD-10-CM

## 2018-12-17 PROCEDURE — 99214 OFFICE O/P EST MOD 30 MIN: CPT | Performed by: UROLOGY

## 2018-12-17 ASSESSMENT — PAIN SCALES - GENERAL: PAINLEVEL: MILD PAIN (2)

## 2018-12-17 ASSESSMENT — MIFFLIN-ST. JEOR: SCORE: 1659.74

## 2018-12-17 NOTE — TELEPHONE ENCOUNTER
The CT scan of your coronary arteriess shows some calcium present.     I think starting lipitor at 10mg daily makes sense. Given this and your ascvd risk score of 7.9%.     I will have my nurse try to call you with the results to make sure you get the message.    Leo Bhatia

## 2018-12-17 NOTE — TELEPHONE ENCOUNTER
YUNIER  I called and gave Adan his results, he wants to wait on this until he sees the report himself. He will call back if he decides to start Lipitor.  Mikaela Duggan RN

## 2018-12-17 NOTE — PROGRESS NOTES
"  CHIEF COMPLAINT   It was my pleasure to see Brice Trevino who is a 58 year old male for follow-up of Prostate Cancer.      HPI  Brice Trevino is a very pleasant 58 year old male who presents with a history of new diagnosis prostate cancer. PSA 4.52. PIRADS 5 on MRI. Woodbury 4+5=9 on TRUS. Of note, positive family history of prostate cancer in pts father. IPSS 11 and notes \"fairly normal\" erections.     Here to review scans.    CT abd/pelvis 12/13/2018  IMPRESSION: No definite acute abnormality. Hiatal hernia.    NM Bone Scan 12/13/2018  IMPRESSION: No evidence of skeletal metastatic disease.      TRUS Biopsy 12/4/2018  FINAL DIAGNOSIS:   Total number of cores submitted: 17   Number of cores involved by carcinoma: 8   Percentage of prostate tissue involved by carcinoma: 18 %   Percentage of carcinoma in most involved core: 100% (nontarget core)   Highest Mariano Score: 9 (4+5)      A. Prostate needle biopsy, Target 1 x 3:   - Atypical glandular proliferation, consistent with intraductal carcinoma     B. Prostate needle biopsy, Target 2 x 2:   - Benign prostatic tissue     C. Prostate needle biopsy, Left base x 2:   - Benign prostatic tissue     D. Prostate needle biopsy, left mid x 2:   - Prostatic adenocarcinoma, acinar type   - Mariano score 9 (4+5)   - Grade Group 5   - Extent: Involves 2/2 cores (5 mm, 35%; <1 mm, <5%)   - Perineural invasion present     E. Prostate needle biopsy, left apex x 2:   - Benign prostatic tissue     F. Prostate needle biopsy, right base x 2:   - Prostatic adenocarcinoma, acinar type   - Woodbury score 9 (4+5)   - Grade Group 5   - Extent: Involves 2/2 cores (5 mm, 40%; 3 mm, 30%)     G. Prostate needle biopsy, right mid x 2:   - Prostatic adenocarcinoma, acinar type   - Mariano score 9 (4+5)   - Grade Group 5   - Extent: Involves 2/2 cores (10 mm, 85%; 3 mm, 30%)     H. Prostate needle biopsy, right apex x 2:   - Prostatic adenocarcinoma, acinar type   - Woodbury score 9 (4+5)   - Grade " "Group 5   - Extent: Involves 2/2 cores (12 mm, 100%; 2 mm, 20%)     MRI Prostate 11/29/2018  IMPRESSION:   Prostate gland size: 3.4 x 4.8 x 3.6 cm  Volume: 31 cc   1. Based on the most suspicious abnormality, this exam is  characterized as PIRADS 5 - Very high probability.  Clinically  significant cancer is highly likely to be present.  The most  suspicious abnormality is located in the right peripheral zone at the  posterior prostate apex and there is evidence of extracapsular  extension.  2. Additional signal abnormality in the posterior left mid to apical  peripheral zone, characterized as PIRADS 4 - High probability.   Clinically significant cancer is likely to be present. No evidence of  extracapsular extension.  3. No evidence of extraprostatic malignancy. No suspicious adenopathy  or evidence of pelvic metastases.     IPSS today: 11 (1,2,1,0,1,1,5) \"Mixed vs Mostly satisfied\"  Erectile function - fairly normal.     REVIEW OF DIAGNOSTICS:  11/9/18 - PSA 4.52ng/dL  05/4/18 - PSA 3.57ng/dL  05/4/18 - Cr 0.97mg/dL    PHYSICAL EXAM  Patient is a 58 year old  male   Vitals: Blood pressure 110/60, height 1.74 m (5' 8.5\"), weight 85.7 kg (189 lb).  General Appearance Adult: Body mass index is 28.32 kg/m .  Alert, no acute distress, oriented  HENT: throat/mouth:normal, good dentition  Lungs: no respiratory distress, or pursed lip breathing  Heart: No obvious jugular venous distension present  Abdomen: soft, nontender, no organomegaly or masses  Back: no CVAT  Musculoskeltal: extremities normal, no peripheral edema  Skin: no suspicious lesions or rashes  Neuro: Alert, oriented, speech and mentation normal  Psych: affect and mood normal  Gait: Normal    ASSESSMENT and PLAN  58 year old male with high-risk, Mariano 4+5 prostate adenocarcinoma, PSA 4.52. We again discussed options for treatment. He has elected prostatectomy and we again discussed this in detail. We discussed logistics of surgery and hospital stays well as " potential risks/benfits of procedure. He and his family asked many excellent questions and we had a long discussion about potential future treatment options, and about his negative scans. In the end, we will proceed with surgery, as planned.    - Proceed with RALP     I spent over 25 minutes with the patient.  Over half this time was spent on counseling regarding prostate cancer.    Alan Sin MD  Urology  [facility]  Clinic Phone [phone]

## 2019-01-10 ENCOUNTER — TELEPHONE (OUTPATIENT)
Dept: FAMILY MEDICINE | Facility: CLINIC | Age: 59
End: 2019-01-10

## 2019-01-10 DIAGNOSIS — C61 PROSTATE CANCER (H): Primary | ICD-10-CM

## 2019-01-10 DIAGNOSIS — E78.2 MIXED HYPERLIPIDEMIA: ICD-10-CM

## 2019-01-10 NOTE — TELEPHONE ENCOUNTER
Reason for Call: Request for an order or referral:    Order or referral being requested: blood test    Date needed: as soon as possible    Additional comments: Pt states to call him for further information as to which blood test needs to be ordered and why.    Phone number Patient can be reached at:  Home number on file [phone] (home)    Best Time:  anytime    Can we leave a detailed message on this number?  YES    Call taken on 1/10/2019 at 11:04 AM by Audrey Regalado

## 2019-01-10 NOTE — TELEPHONE ENCOUNTER
LOV: 11/9/2018    Patient requesting PSA and FLP--Please sign off if agree    Patient did follow up with Urology-office visit on 11/27    --Last FLP one completed: 11/9/2018    Last PSA-11/9    Please see notes from last results on 11/9.     Thanks! Hilary Paez RN

## 2019-01-10 NOTE — TELEPHONE ENCOUNTER
Writer called patient and patient asked if he could call clinic right back.   Will await call back to find out which blood test needed,    Thanks! Hilary Paez RN

## 2019-01-11 DIAGNOSIS — E78.2 MIXED HYPERLIPIDEMIA: ICD-10-CM

## 2019-01-11 DIAGNOSIS — C61 PROSTATE CANCER (H): ICD-10-CM

## 2019-01-11 LAB
CHOLEST SERPL-MCNC: 240 MG/DL
HDLC SERPL-MCNC: 51 MG/DL
LDLC SERPL CALC-MCNC: 170 MG/DL
NONHDLC SERPL-MCNC: 189 MG/DL
PSA SERPL-MCNC: 6.24 UG/L (ref 0–4)
TRIGL SERPL-MCNC: 94 MG/DL

## 2019-01-11 PROCEDURE — 36415 COLL VENOUS BLD VENIPUNCTURE: CPT | Performed by: FAMILY MEDICINE

## 2019-01-11 PROCEDURE — 80061 LIPID PANEL: CPT | Performed by: FAMILY MEDICINE

## 2019-01-11 PROCEDURE — 84153 ASSAY OF PSA TOTAL: CPT | Performed by: FAMILY MEDICINE

## 2019-01-14 DIAGNOSIS — Z01.812 PRE-OPERATIVE LABORATORY EXAMINATION: Primary | ICD-10-CM

## 2019-01-14 RX ORDER — FERROUS SULFATE 324(65)MG
324 TABLET, DELAYED RELEASE (ENTERIC COATED) ORAL DAILY
COMMUNITY
End: 2019-03-11

## 2019-01-14 ASSESSMENT — MIFFLIN-ST. JEOR: SCORE: 1641.59

## 2019-01-15 ENCOUNTER — OFFICE VISIT (OUTPATIENT)
Dept: FAMILY MEDICINE | Facility: CLINIC | Age: 59
End: 2019-01-15
Payer: COMMERCIAL

## 2019-01-15 VITALS
BODY MASS INDEX: 27.7 KG/M2 | OXYGEN SATURATION: 97 % | WEIGHT: 187 LBS | HEIGHT: 69 IN | TEMPERATURE: 98.3 F | RESPIRATION RATE: 16 BRPM | SYSTOLIC BLOOD PRESSURE: 115 MMHG | HEART RATE: 74 BPM | DIASTOLIC BLOOD PRESSURE: 62 MMHG

## 2019-01-15 DIAGNOSIS — Z01.818 PREOP GENERAL PHYSICAL EXAM: Primary | ICD-10-CM

## 2019-01-15 DIAGNOSIS — C61 PROSTATE CANCER (H): ICD-10-CM

## 2019-01-15 PROCEDURE — 99214 OFFICE O/P EST MOD 30 MIN: CPT | Performed by: NURSE PRACTITIONER

## 2019-01-15 PROCEDURE — 93000 ELECTROCARDIOGRAM COMPLETE: CPT | Performed by: NURSE PRACTITIONER

## 2019-01-15 ASSESSMENT — MIFFLIN-ST. JEOR: SCORE: 1650.67

## 2019-01-15 NOTE — PROGRESS NOTES
[address] [address]  [phone]  Dept: [phone]    PRE-OP EVALUATION:  Today's date: 1/15/2019    Brice Trevino (: 1960) presents for pre-operative evaluation assessment as requested by Dr. Sin.  He requires evaluation and anesthesia risk assessment prior to undergoing surgery/procedure for treatment of robotic assisted laparoscopic radical prostatectomy bilateral pelvic lymphandectomy, possible open.    Fax number for surgical facility: electronic  Primary Physician: Leo Bhatia  Type of Anesthesia Anticipated: General    Patient has a Health Care Directive or Living Will:  NO    Preop Questions 1/15/2019   Who is doing your surgery? Alan Sin   What are you having done? [facility]   Date of Surgery/Procedure: 2019   Facility or Hospital where procedure/surgery will be performed: [facility]   1.  Do you have a history of Heart attack, stroke, stent, coronary bypass surgery, or other heart surgery? No   2.  Do you ever have any pain or discomfort in your chest? No   3.  Do you have a history of  Heart Failure? No   4.   Are you troubled by shortness of breath when:  walking on a level surface, or up a slight hill, or at night? No   5.  Do you currently have a cold, bronchitis or other respiratory infection? No   6.  Do you have a cough, shortness of breath, or wheezing? No   7.  Do you sometimes get pains in the calves of your legs when you walk? No   8. Do you or anyone in your family have previous history of blood clots? No   9.  Do you or does anyone in your family have a serious bleeding problem such as prolonged bleeding following surgeries or cuts? No   10. Have you ever had problems with anemia or been told to take iron pills? Never anemia, but currently taking iron in his daily MVI   11. Have you had any abnormal blood loss such as black, tarry or bloody stools? No   12. Have you ever had a  blood transfusion? UNKNOWN - decades prior (1980's)   13. Have you or any of your relatives ever had problems with anesthesia? No   14. Do you have sleep apnea, excessive snoring or daytime drowsiness? No   15. Do you have any prosthetic heart valves? No   16. Do you have prosthetic joints? No       HPI:     HPI related to upcoming procedure:   New diagnosis prostate cancer.         MEDICAL HISTORY:     Patient Active Problem List    Diagnosis Date Noted     Prostate cancer (H) 12/11/2018     Priority: High     Migraine 12/09/2016     Priority: Medium     Indigestion 09/26/2014     Priority: Medium     Obesity 09/26/2014     Priority: Medium      Past Medical History:   Diagnosis Date     Blood glucose elevated      Dermatitis      Gastroesophageal reflux disease      Inguinal hernia     right very small     Prostate cancer (H)      Past Surgical History:   Procedure Laterality Date     COLONOSCOPY  4/19/2013    Procedure: COLONOSCOPY;  Colonoscopy;  Surgeon: Yovany Alcaraz MD;  Location:  GI     RHINOPLASTY       Current Outpatient Medications   Medication Sig Dispense Refill     Ferrous Sulfate 324 (65 Fe) MG TBEC Take 324 mg by mouth daily Pt taking last capsule 1/15/19       RaNITidine HCl (RANITIDINE 150 MAX STRENGTH PO) Take 150 mg by mouth daily        diazepam (VALIUM) 10 MG tablet Take 1 tablet (10 mg) by mouth every 6 hours as needed for anxiety Take 30-60 minutes before procedure.  Do not operate a vehicle after taking this medication. (Patient not taking: Reported on 12/10/2018) 1 tablet 0     fluocinonide (LIDEX) 0.05 % ointment DENILSON EXT AA BID  1     terbinafine (LAMISIL AT) 1 % cream Apply topically 2 times daily For fungal infection not resolved with other antifungals (e.g. Clotrimazole) (Patient not taking: Reported on 1/15/2019) 12 g 1     OTC products: None, except as noted above    Allergies   Allergen Reactions     No Known Drug Allergies       Latex Allergy: NO    Social History  "    Tobacco Use     Smoking status: Never Smoker     Smokeless tobacco: Never Used   Substance Use Topics     Alcohol use: No     History   Drug Use No       REVIEW OF SYSTEMS:   CONSTITUTIONAL: NEGATIVE for fever, chills, change in weight  INTEGUMENTARY/SKIN: NEGATIVE for worrisome rashes, moles or lesions  EYES: NEGATIVE for vision changes or irritation  ENT/MOUTH: NEGATIVE for ear, mouth and throat problems  RESP: NEGATIVE for significant cough or SOB  CV: NEGATIVE for chest pain, palpitations or peripheral edema  GI: NEGATIVE for nausea, abdominal pain, heartburn, or change in bowel habits  : NEGATIVE for urgency, dysuria, or hematuria. POSITIVE for frequency (this is typical for him)  MUSCULOSKELETAL: NEGATIVE for significant arthralgias or myalgia  NEURO: NEGATIVE for weakness, dizziness or paresthesias  ENDOCRINE: NEGATIVE for temperature intolerance, skin/hair changes  HEME: NEGATIVE for bleeding or bruising problems  PSYCHIATRIC: NEGATIVE for changes in mood or affect    EXAM:   /62   Pulse 74   Temp 98.3  F (36.8  C) (Oral)   Resp 16   Ht 1.74 m (5' 8.5\")   Wt 84.8 kg (187 lb)   SpO2 97%   BMI 28.02 kg/m      GENERAL APPEARANCE: healthy, alert and no distress     EYES: EOMI,  PERRL     HENT: ear canals and TM's normal and nose and mouth without ulcers or lesions     NECK: no adenopathy, no asymmetry, masses, or scars and thyroid normal to palpation     RESP: lungs clear to auscultation - no rales, rhonchi or wheezes     CV: regular rates and rhythm, normal S1 S2, no S3 or S4 and no murmur, click or rub     ABDOMEN:  soft, nontender, no HSM or masses and bowel sounds normal     MS: extremities normal- no gross deformities noted, no evidence of inflammation in joints, FROM in all extremities.     SKIN: no suspicious lesions or rashes     NEURO: Normal strength and tone, sensory exam grossly normal, mentation intact and speech normal     PSYCH: mentation appears normal. and affect " normal/bright     LYMPHATICS: No cervical adenopathy    DIAGNOSTICS:   EKG: appears normal, NSR, normal axis, normal intervals, no acute ST/T changes c/w ischemia, unchanged from previous tracings    Recent Labs   Lab Test 11/09/18  1534 05/04/18  1012  02/09/16  1757   HGB  --   --   --  13.4   NA  --  139  --   --    POTASSIUM  --  4.4  --   --    CR  --  0.93  --   --    A1C 5.7* 5.9*   < > 6.2*    < > = values in this interval not displayed.        IMPRESSION:   (Z01.818) Preop general physical exam  (primary encounter diagnosis)  Comment:   Plan: EKG 12-lead complete w/read - Clinics        Cleared for surgery    (C61) Prostate cancer (H)  Comment:   Plan: cleared for surgery      The proposed surgical procedure is considered LOW risk.    REVISED CARDIAC RISK INDEX  The patient has the following serious cardiovascular risks for perioperative complications such as (MI, PE, VFib and 3  AV Block):  No serious cardiac risks  INTERPRETATION: 0 risks: Class I (very low risk - 0.4% complication rate)    The patient has the following additional risks for perioperative complications:  No identified additional risks      ICD-10-CM    1. Preop general physical exam Z01.818    2. Prostate cancer (H) C61        RECOMMENDATIONS:     --Consult hospital rounder / IM to assist post-op medical management    --Patient is on no chronic medications    APPROVAL GIVEN to proceed with proposed procedure, without further diagnostic evaluation       Signed Electronically by: LENARD Farah CNP    Copy of this evaluation report is provided to requesting physicianFunmi Brian Preop Guidelines    Revised Cardiac Risk Index

## 2019-01-16 ENCOUNTER — HOSPITAL ENCOUNTER (OUTPATIENT)
Dept: LAB | Facility: CLINIC | Age: 59
Discharge: HOME | End: 2019-01-16
Attending: UROLOGY | Admitting: UROLOGY
Payer: COMMERCIAL

## 2019-01-16 DIAGNOSIS — Z01.812 PRE-OPERATIVE LABORATORY EXAMINATION: ICD-10-CM

## 2019-01-16 LAB
ABO + RH BLD: NORMAL
ABO + RH BLD: NORMAL
BLD GP AB SCN SERPL QL: NORMAL
BLOOD BANK CMNT PATIENT-IMP: NORMAL
HGB BLD-MCNC: 14.3 G/DL (ref 13.3–17.7)
SPECIMEN EXP DATE BLD: NORMAL

## 2019-01-16 PROCEDURE — 85018 HEMOGLOBIN: CPT | Performed by: UROLOGY

## 2019-01-16 PROCEDURE — 86850 RBC ANTIBODY SCREEN: CPT | Performed by: UROLOGY

## 2019-01-16 PROCEDURE — 36415 COLL VENOUS BLD VENIPUNCTURE: CPT | Performed by: UROLOGY

## 2019-01-16 PROCEDURE — 86901 BLOOD TYPING SEROLOGIC RH(D): CPT | Performed by: UROLOGY

## 2019-01-16 PROCEDURE — 86900 BLOOD TYPING SEROLOGIC ABO: CPT | Performed by: UROLOGY

## 2019-01-16 NOTE — PHARMACY-ADMISSION MEDICATION HISTORY
Admission medication history interview status for this patient is complete. See [facility] admission navigator for allergy information, prior to admission medications and immunization status.     PTA med list completed by pre-admitting nurse,   Nurse Rupali Wheeler RN Mon Jan 14, 2019  5:18 PM       Prior to Admission medications    Medication Sig Last Dose Taking? Auth Provider   Ferrous Sulfate 324 (65 Fe) MG TBEC Take 324 mg by mouth daily Pt taking last capsule 1/15/19  Yes Reported, Patient   diazepam (VALIUM) 10 MG tablet Take 1 tablet (10 mg) by mouth every 6 hours as needed for anxiety Take 30-60 minutes before procedure.  Do not operate a vehicle after taking this medication.  Patient not taking: Reported on 12/10/2018   Zoe Carrillo PA   fluocinonide (LIDEX) 0.05 % ointment DENILSON EXT AA BID   Reported, Patient   RaNITidine HCl (RANITIDINE 150 MAX STRENGTH PO) Take 150 mg by mouth daily    Reported, Patient

## 2019-01-17 ENCOUNTER — ANESTHESIA EVENT (OUTPATIENT)
Dept: SURGERY | Facility: CLINIC | Age: 59
End: 2019-01-17
Payer: COMMERCIAL

## 2019-01-18 ENCOUNTER — ANESTHESIA (OUTPATIENT)
Dept: SURGERY | Facility: CLINIC | Age: 59
End: 2019-01-18
Payer: COMMERCIAL

## 2019-01-18 ENCOUNTER — HOSPITAL ENCOUNTER (OUTPATIENT)
Facility: CLINIC | Age: 59
LOS: 1 days | Discharge: HOME | End: 2019-01-20
Attending: UROLOGY | Admitting: UROLOGY
Payer: COMMERCIAL

## 2019-01-18 DIAGNOSIS — C61 PROSTATE CANCER (H): Primary | ICD-10-CM

## 2019-01-18 LAB
ANION GAP SERPL CALCULATED.3IONS-SCNC: 8 MMOL/L (ref 3–14)
BUN SERPL-MCNC: 15 MG/DL (ref 7–30)
CALCIUM SERPL-MCNC: 8.2 MG/DL (ref 8.5–10.1)
CHLORIDE SERPL-SCNC: 107 MMOL/L (ref 94–109)
CO2 SERPL-SCNC: 25 MMOL/L (ref 20–32)
CREAT SERPL-MCNC: 0.75 MG/DL (ref 0.66–1.25)
ERYTHROCYTE [DISTWIDTH] IN BLOOD BY AUTOMATED COUNT: 12.5 % (ref 10–15)
GFR SERPL CREATININE-BSD FRML MDRD: >90 ML/MIN/{1.73_M2}
GLUCOSE SERPL-MCNC: 150 MG/DL (ref 70–99)
HCT VFR BLD AUTO: 38.7 % (ref 40–53)
HGB BLD-MCNC: 12.8 G/DL (ref 13.3–17.7)
MCH RBC QN AUTO: 31.9 PG (ref 26.5–33)
MCHC RBC AUTO-ENTMCNC: 33.1 G/DL (ref 31.5–36.5)
MCV RBC AUTO: 97 FL (ref 78–100)
PLATELET # BLD AUTO: 156 10E9/L (ref 150–450)
POTASSIUM SERPL-SCNC: 4.1 MMOL/L (ref 3.4–5.3)
RBC # BLD AUTO: 4.01 10E12/L (ref 4.4–5.9)
SODIUM SERPL-SCNC: 140 MMOL/L (ref 133–144)
WBC # BLD AUTO: 10.8 10E9/L (ref 4–11)

## 2019-01-18 PROCEDURE — 71000013 ZZH RECOVERY PHASE 1 LEVEL 1 EA ADDTL HR: Performed by: UROLOGY

## 2019-01-18 PROCEDURE — 55866 LAPS SURG PRST8ECT RPBIC RAD: CPT | Performed by: UROLOGY

## 2019-01-18 PROCEDURE — 88305 TISSUE EXAM BY PATHOLOGIST: CPT | Performed by: UROLOGY

## 2019-01-18 PROCEDURE — 88309 TISSUE EXAM BY PATHOLOGIST: CPT | Performed by: UROLOGY

## 2019-01-18 PROCEDURE — 80048 BASIC METABOLIC PNL TOTAL CA: CPT | Performed by: UROLOGY

## 2019-01-18 PROCEDURE — 25800025 ZZH RX 258: Performed by: UROLOGY

## 2019-01-18 PROCEDURE — 36415 COLL VENOUS BLD VENIPUNCTURE: CPT | Performed by: UROLOGY

## 2019-01-18 PROCEDURE — 88331 PATH CONSLTJ SURG 1 BLK 1SPC: CPT | Performed by: UROLOGY

## 2019-01-18 PROCEDURE — 85027 COMPLETE CBC AUTOMATED: CPT | Performed by: UROLOGY

## 2019-01-18 PROCEDURE — 37000008 ZZH ANESTHESIA TECHNICAL FEE, 1ST 30 MIN: Performed by: UROLOGY

## 2019-01-18 PROCEDURE — 37000009 ZZH ANESTHESIA TECHNICAL FEE, EACH ADDTL 15 MIN: Performed by: UROLOGY

## 2019-01-18 PROCEDURE — 88305 TISSUE EXAM BY PATHOLOGIST: CPT | Mod: 26,59 | Performed by: UROLOGY

## 2019-01-18 PROCEDURE — 88309 TISSUE EXAM BY PATHOLOGIST: CPT | Mod: 26 | Performed by: UROLOGY

## 2019-01-18 PROCEDURE — 25000128 H RX IP 250 OP 636: Performed by: ANESTHESIOLOGY

## 2019-01-18 PROCEDURE — 25000128 H RX IP 250 OP 636: Performed by: NURSE ANESTHETIST, CERTIFIED REGISTERED

## 2019-01-18 PROCEDURE — 38571 LAPAROSCOPY LYMPHADENECTOMY: CPT | Mod: 51 | Performed by: UROLOGY

## 2019-01-18 PROCEDURE — 71000012 ZZH RECOVERY PHASE 1 LEVEL 1 FIRST HR: Performed by: UROLOGY

## 2019-01-18 PROCEDURE — 25000128 H RX IP 250 OP 636: Performed by: UROLOGY

## 2019-01-18 PROCEDURE — 88307 TISSUE EXAM BY PATHOLOGIST: CPT | Performed by: UROLOGY

## 2019-01-18 PROCEDURE — 25000125 ZZHC RX 250: Performed by: NURSE ANESTHETIST, CERTIFIED REGISTERED

## 2019-01-18 PROCEDURE — 25000125 ZZHC RX 250: Performed by: ANESTHESIOLOGY

## 2019-01-18 PROCEDURE — 88331 PATH CONSLTJ SURG 1 BLK 1SPC: CPT | Mod: 26,59 | Performed by: UROLOGY

## 2019-01-18 PROCEDURE — 88307 TISSUE EXAM BY PATHOLOGIST: CPT | Mod: 26 | Performed by: UROLOGY

## 2019-01-18 PROCEDURE — 40000306 ZZH STATISTIC PRE PROC ASSESS II: Performed by: UROLOGY

## 2019-01-18 PROCEDURE — 36000085 ZZH SURGERY LEVEL 8 1ST 30 MIN: Performed by: UROLOGY

## 2019-01-18 PROCEDURE — 36000087 ZZH SURGERY LEVEL 8 EA 15 ADDTL MIN: Performed by: UROLOGY

## 2019-01-18 PROCEDURE — 27210794 ZZH OR GENERAL SUPPLY STERILE: Performed by: UROLOGY

## 2019-01-18 RX ORDER — PROMETHAZINE HYDROCHLORIDE 25 MG/ML
25 INJECTION, SOLUTION INTRAMUSCULAR; INTRAVENOUS EVERY 6 HOURS PRN
Status: DISCONTINUED | OUTPATIENT
Start: 2019-01-18 | End: 2019-01-20 | Stop reason: HOSPADM

## 2019-01-18 RX ORDER — PROCHLORPERAZINE MALEATE 10 MG
10 TABLET ORAL EVERY 6 HOURS PRN
Status: DISCONTINUED | OUTPATIENT
Start: 2019-01-18 | End: 2019-01-20 | Stop reason: HOSPADM

## 2019-01-18 RX ORDER — HYDRALAZINE HYDROCHLORIDE 20 MG/ML
2.5-5 INJECTION INTRAMUSCULAR; INTRAVENOUS EVERY 10 MIN PRN
Status: DISCONTINUED | OUTPATIENT
Start: 2019-01-18 | End: 2019-01-18 | Stop reason: HOSPADM

## 2019-01-18 RX ORDER — SODIUM CHLORIDE, SODIUM LACTATE, POTASSIUM CHLORIDE, CALCIUM CHLORIDE 600; 310; 30; 20 MG/100ML; MG/100ML; MG/100ML; MG/100ML
INJECTION, SOLUTION INTRAVENOUS CONTINUOUS
Status: DISCONTINUED | OUTPATIENT
Start: 2019-01-18 | End: 2019-01-18 | Stop reason: HOSPADM

## 2019-01-18 RX ORDER — HEPARIN SODIUM 5000 [USP'U]/.5ML
5000 INJECTION, SOLUTION INTRAVENOUS; SUBCUTANEOUS
Status: COMPLETED | OUTPATIENT
Start: 2019-01-18 | End: 2019-01-18

## 2019-01-18 RX ORDER — LIDOCAINE 40 MG/G
CREAM TOPICAL
Status: DISCONTINUED | OUTPATIENT
Start: 2019-01-18 | End: 2019-01-18 | Stop reason: HOSPADM

## 2019-01-18 RX ORDER — PROPOFOL 10 MG/ML
INJECTION, EMULSION INTRAVENOUS PRN
Status: DISCONTINUED | OUTPATIENT
Start: 2019-01-18 | End: 2019-01-18

## 2019-01-18 RX ORDER — HYDROMORPHONE HYDROCHLORIDE 1 MG/ML
.3-.5 INJECTION, SOLUTION INTRAMUSCULAR; INTRAVENOUS; SUBCUTANEOUS EVERY 5 MIN PRN
Status: DISCONTINUED | OUTPATIENT
Start: 2019-01-18 | End: 2019-01-18 | Stop reason: HOSPADM

## 2019-01-18 RX ORDER — ALBUTEROL SULFATE 0.83 MG/ML
2.5 SOLUTION RESPIRATORY (INHALATION) EVERY 4 HOURS PRN
Status: DISCONTINUED | OUTPATIENT
Start: 2019-01-18 | End: 2019-01-18 | Stop reason: HOSPADM

## 2019-01-18 RX ORDER — DIMENHYDRINATE 50 MG/ML
25 INJECTION, SOLUTION INTRAMUSCULAR; INTRAVENOUS
Status: DISCONTINUED | OUTPATIENT
Start: 2019-01-18 | End: 2019-01-18 | Stop reason: HOSPADM

## 2019-01-18 RX ORDER — FENTANYL CITRATE 50 UG/ML
INJECTION, SOLUTION INTRAMUSCULAR; INTRAVENOUS PRN
Status: DISCONTINUED | OUTPATIENT
Start: 2019-01-18 | End: 2019-01-18

## 2019-01-18 RX ORDER — DEXAMETHASONE SODIUM PHOSPHATE 4 MG/ML
INJECTION, SOLUTION INTRA-ARTICULAR; INTRALESIONAL; INTRAMUSCULAR; INTRAVENOUS; SOFT TISSUE PRN
Status: DISCONTINUED | OUTPATIENT
Start: 2019-01-18 | End: 2019-01-18

## 2019-01-18 RX ORDER — ONDANSETRON 2 MG/ML
INJECTION INTRAMUSCULAR; INTRAVENOUS PRN
Status: DISCONTINUED | OUTPATIENT
Start: 2019-01-18 | End: 2019-01-18

## 2019-01-18 RX ORDER — CEFAZOLIN SODIUM 2 G/100ML
2 INJECTION, SOLUTION INTRAVENOUS
Status: COMPLETED | OUTPATIENT
Start: 2019-01-18 | End: 2019-01-18

## 2019-01-18 RX ORDER — ONDANSETRON 2 MG/ML
4 INJECTION INTRAMUSCULAR; INTRAVENOUS EVERY 30 MIN PRN
Status: DISCONTINUED | OUTPATIENT
Start: 2019-01-18 | End: 2019-01-18 | Stop reason: HOSPADM

## 2019-01-18 RX ORDER — ONDANSETRON 2 MG/ML
4 INJECTION INTRAMUSCULAR; INTRAVENOUS EVERY 6 HOURS PRN
Status: DISCONTINUED | OUTPATIENT
Start: 2019-01-18 | End: 2019-01-20 | Stop reason: HOSPADM

## 2019-01-18 RX ORDER — NALOXONE HYDROCHLORIDE 0.4 MG/ML
.1-.4 INJECTION, SOLUTION INTRAMUSCULAR; INTRAVENOUS; SUBCUTANEOUS
Status: ACTIVE | OUTPATIENT
Start: 2019-01-18 | End: 2019-01-19

## 2019-01-18 RX ORDER — EPHEDRINE SULFATE 50 MG/ML
INJECTION, SOLUTION INTRAMUSCULAR; INTRAVENOUS; SUBCUTANEOUS PRN
Status: DISCONTINUED | OUTPATIENT
Start: 2019-01-18 | End: 2019-01-18

## 2019-01-18 RX ORDER — LABETALOL HYDROCHLORIDE 5 MG/ML
10 INJECTION, SOLUTION INTRAVENOUS
Status: DISCONTINUED | OUTPATIENT
Start: 2019-01-18 | End: 2019-01-18 | Stop reason: HOSPADM

## 2019-01-18 RX ORDER — ONDANSETRON 4 MG/1
4 TABLET, ORALLY DISINTEGRATING ORAL EVERY 30 MIN PRN
Status: DISCONTINUED | OUTPATIENT
Start: 2019-01-18 | End: 2019-01-18 | Stop reason: HOSPADM

## 2019-01-18 RX ORDER — PROPOFOL 10 MG/ML
INJECTION, EMULSION INTRAVENOUS CONTINUOUS PRN
Status: DISCONTINUED | OUTPATIENT
Start: 2019-01-18 | End: 2019-01-18

## 2019-01-18 RX ORDER — NALOXONE HYDROCHLORIDE 0.4 MG/ML
.1-.4 INJECTION, SOLUTION INTRAMUSCULAR; INTRAVENOUS; SUBCUTANEOUS
Status: DISCONTINUED | OUTPATIENT
Start: 2019-01-18 | End: 2019-01-18

## 2019-01-18 RX ORDER — ACETAMINOPHEN 325 MG/1
975 TABLET ORAL EVERY 6 HOURS
Status: DISCONTINUED | OUTPATIENT
Start: 2019-01-18 | End: 2019-01-20 | Stop reason: HOSPADM

## 2019-01-18 RX ORDER — LIDOCAINE 40 MG/G
CREAM TOPICAL
Status: DISCONTINUED | OUTPATIENT
Start: 2019-01-18 | End: 2019-01-20 | Stop reason: HOSPADM

## 2019-01-18 RX ORDER — CEFAZOLIN SODIUM 1 G/3ML
1 INJECTION, POWDER, FOR SOLUTION INTRAMUSCULAR; INTRAVENOUS SEE ADMIN INSTRUCTIONS
Status: DISCONTINUED | OUTPATIENT
Start: 2019-01-18 | End: 2019-01-18 | Stop reason: HOSPADM

## 2019-01-18 RX ORDER — KETOROLAC TROMETHAMINE 30 MG/ML
15 INJECTION, SOLUTION INTRAMUSCULAR; INTRAVENOUS EVERY 6 HOURS
Status: COMPLETED | OUTPATIENT
Start: 2019-01-18 | End: 2019-01-19

## 2019-01-18 RX ORDER — SODIUM CHLORIDE 9 MG/ML
INJECTION, SOLUTION INTRAVENOUS CONTINUOUS
Status: DISCONTINUED | OUTPATIENT
Start: 2019-01-18 | End: 2019-01-20 | Stop reason: HOSPADM

## 2019-01-18 RX ORDER — EPHEDRINE SULFATE 50 MG/ML
25 INJECTION, SOLUTION INTRAVENOUS ONCE
Status: COMPLETED | OUTPATIENT
Start: 2019-01-18 | End: 2019-01-18

## 2019-01-18 RX ORDER — GLYCOPYRROLATE 0.2 MG/ML
INJECTION, SOLUTION INTRAMUSCULAR; INTRAVENOUS PRN
Status: DISCONTINUED | OUTPATIENT
Start: 2019-01-18 | End: 2019-01-18

## 2019-01-18 RX ORDER — NEOSTIGMINE METHYLSULFATE 1 MG/ML
VIAL (ML) INJECTION PRN
Status: DISCONTINUED | OUTPATIENT
Start: 2019-01-18 | End: 2019-01-18

## 2019-01-18 RX ORDER — LIDOCAINE HYDROCHLORIDE 10 MG/ML
INJECTION, SOLUTION INFILTRATION; PERINEURAL PRN
Status: DISCONTINUED | OUTPATIENT
Start: 2019-01-18 | End: 2019-01-18

## 2019-01-18 RX ORDER — ALUMINA, MAGNESIA, AND SIMETHICONE 2400; 2400; 240 MG/30ML; MG/30ML; MG/30ML
30 SUSPENSION ORAL EVERY 4 HOURS PRN
Status: DISCONTINUED | OUTPATIENT
Start: 2019-01-18 | End: 2019-01-20 | Stop reason: HOSPADM

## 2019-01-18 RX ORDER — FENTANYL CITRATE 50 UG/ML
25-50 INJECTION, SOLUTION INTRAMUSCULAR; INTRAVENOUS
Status: DISCONTINUED | OUTPATIENT
Start: 2019-01-18 | End: 2019-01-18 | Stop reason: HOSPADM

## 2019-01-18 RX ORDER — BUPIVACAINE HYDROCHLORIDE 2.5 MG/ML
INJECTION, SOLUTION INFILTRATION; PERINEURAL PRN
Status: DISCONTINUED | OUTPATIENT
Start: 2019-01-18 | End: 2019-01-18 | Stop reason: HOSPADM

## 2019-01-18 RX ORDER — HYDROMORPHONE HYDROCHLORIDE 1 MG/ML
.2-.4 INJECTION, SOLUTION INTRAMUSCULAR; INTRAVENOUS; SUBCUTANEOUS
Status: DISCONTINUED | OUTPATIENT
Start: 2019-01-18 | End: 2019-01-20 | Stop reason: HOSPADM

## 2019-01-18 RX ORDER — OXYCODONE HYDROCHLORIDE 5 MG/1
5-10 TABLET ORAL
Status: DISCONTINUED | OUTPATIENT
Start: 2019-01-18 | End: 2019-01-20 | Stop reason: HOSPADM

## 2019-01-18 RX ORDER — KETAMINE HYDROCHLORIDE 10 MG/ML
INJECTION INTRAMUSCULAR; INTRAVENOUS PRN
Status: DISCONTINUED | OUTPATIENT
Start: 2019-01-18 | End: 2019-01-18

## 2019-01-18 RX ORDER — ONDANSETRON 4 MG/1
4 TABLET, ORALLY DISINTEGRATING ORAL EVERY 6 HOURS PRN
Status: DISCONTINUED | OUTPATIENT
Start: 2019-01-18 | End: 2019-01-20 | Stop reason: HOSPADM

## 2019-01-18 RX ORDER — NEOMYCIN/BACITRACIN/POLYMYXINB 3.5-400-5K
OINTMENT (GRAM) TOPICAL 4 TIMES DAILY PRN
Status: DISCONTINUED | OUTPATIENT
Start: 2019-01-18 | End: 2019-01-20 | Stop reason: HOSPADM

## 2019-01-18 RX ADMIN — FENTANYL CITRATE 50 MCG: 50 INJECTION, SOLUTION INTRAMUSCULAR; INTRAVENOUS at 19:55

## 2019-01-18 RX ADMIN — GLYCOPYRROLATE 0.2 MG: 0.2 INJECTION, SOLUTION INTRAMUSCULAR; INTRAVENOUS at 17:40

## 2019-01-18 RX ADMIN — SODIUM CHLORIDE, POTASSIUM CHLORIDE, SODIUM LACTATE AND CALCIUM CHLORIDE: 600; 310; 30; 20 INJECTION, SOLUTION INTRAVENOUS at 13:45

## 2019-01-18 RX ADMIN — ONDANSETRON 4 MG: 2 INJECTION INTRAMUSCULAR; INTRAVENOUS at 21:52

## 2019-01-18 RX ADMIN — PROMETHAZINE HYDROCHLORIDE 25 MG: 25 INJECTION INTRAMUSCULAR; INTRAVENOUS at 20:38

## 2019-01-18 RX ADMIN — Medication 5 MG: at 17:42

## 2019-01-18 RX ADMIN — GLYCOPYRROLATE 0.3 MG: 0.2 INJECTION, SOLUTION INTRAMUSCULAR; INTRAVENOUS at 13:10

## 2019-01-18 RX ADMIN — Medication 2 MG: at 17:40

## 2019-01-18 RX ADMIN — HYDROMORPHONE HYDROCHLORIDE 1 MG: 1 INJECTION, SOLUTION INTRAMUSCULAR; INTRAVENOUS; SUBCUTANEOUS at 13:40

## 2019-01-18 RX ADMIN — SODIUM CHLORIDE, POTASSIUM CHLORIDE, SODIUM LACTATE AND CALCIUM CHLORIDE: 600; 310; 30; 20 INJECTION, SOLUTION INTRAVENOUS at 17:12

## 2019-01-18 RX ADMIN — LIDOCAINE HYDROCHLORIDE 50 MG: 10 INJECTION, SOLUTION INFILTRATION; PERINEURAL at 13:08

## 2019-01-18 RX ADMIN — HEPARIN SODIUM 5000 UNITS: 5000 INJECTION, SOLUTION INTRAVENOUS; SUBCUTANEOUS at 12:11

## 2019-01-18 RX ADMIN — PROPOFOL: 10 INJECTION, EMULSION INTRAVENOUS at 15:00

## 2019-01-18 RX ADMIN — KETOROLAC TROMETHAMINE 15 MG: 30 INJECTION, SOLUTION INTRAMUSCULAR at 21:53

## 2019-01-18 RX ADMIN — Medication 10 MG: at 17:45

## 2019-01-18 RX ADMIN — FENTANYL CITRATE 100 MCG: 50 INJECTION, SOLUTION INTRAMUSCULAR; INTRAVENOUS at 13:08

## 2019-01-18 RX ADMIN — PROPOFOL 50 MCG/KG/MIN: 10 INJECTION, EMULSION INTRAVENOUS at 13:05

## 2019-01-18 RX ADMIN — CEFAZOLIN SODIUM 1 G: 2 INJECTION, SOLUTION INTRAVENOUS at 15:00

## 2019-01-18 RX ADMIN — Medication 5 MG: at 17:38

## 2019-01-18 RX ADMIN — SODIUM CHLORIDE: 9 INJECTION, SOLUTION INTRAVENOUS at 21:52

## 2019-01-18 RX ADMIN — ROCURONIUM BROMIDE 50 MG: 10 INJECTION INTRAVENOUS at 13:09

## 2019-01-18 RX ADMIN — DEXAMETHASONE SODIUM PHOSPHATE 4 MG: 4 INJECTION, SOLUTION INTRA-ARTICULAR; INTRALESIONAL; INTRAMUSCULAR; INTRAVENOUS; SOFT TISSUE at 13:09

## 2019-01-18 RX ADMIN — EPHEDRINE SULFATE 25 MG: 50 INJECTION, SOLUTION INTRAVENOUS at 20:38

## 2019-01-18 RX ADMIN — MIDAZOLAM 2 MG: 1 INJECTION INTRAMUSCULAR; INTRAVENOUS at 12:58

## 2019-01-18 RX ADMIN — ONDANSETRON 4 MG: 2 INJECTION INTRAMUSCULAR; INTRAVENOUS at 19:44

## 2019-01-18 RX ADMIN — ONDANSETRON 4 MG: 2 INJECTION INTRAMUSCULAR; INTRAVENOUS at 15:00

## 2019-01-18 RX ADMIN — Medication 50 MG: at 13:40

## 2019-01-18 RX ADMIN — DEXMEDETOMIDINE HYDROCHLORIDE 0.4 MCG/KG/HR: 100 INJECTION, SOLUTION INTRAVENOUS at 13:05

## 2019-01-18 RX ADMIN — PROPOFOL 200 MG: 10 INJECTION, EMULSION INTRAVENOUS at 13:08

## 2019-01-18 RX ADMIN — SODIUM CHLORIDE, POTASSIUM CHLORIDE, SODIUM LACTATE AND CALCIUM CHLORIDE: 600; 310; 30; 20 INJECTION, SOLUTION INTRAVENOUS at 12:58

## 2019-01-18 RX ADMIN — DEXMEDETOMIDINE HYDROCHLORIDE: 100 INJECTION, SOLUTION INTRAVENOUS at 15:58

## 2019-01-18 RX ADMIN — CEFAZOLIN SODIUM 2 G: 2 INJECTION, SOLUTION INTRAVENOUS at 12:58

## 2019-01-18 RX ADMIN — CEFAZOLIN 1 G: 1 INJECTION, POWDER, FOR SOLUTION INTRAMUSCULAR; INTRAVENOUS at 17:00

## 2019-01-18 RX ADMIN — Medication 5 MG: at 17:10

## 2019-01-18 RX ADMIN — SODIUM CHLORIDE, POTASSIUM CHLORIDE, SODIUM LACTATE AND CALCIUM CHLORIDE: 600; 310; 30; 20 INJECTION, SOLUTION INTRAVENOUS at 19:04

## 2019-01-18 ASSESSMENT — MIFFLIN-ST. JEOR: SCORE: 1642.73

## 2019-01-18 NOTE — OP NOTE
PREPROCEDURE DIAGNOSIS: Prostate cancer.   POSTPROCEDURE DIAGNOSIS: Prostate cancer.   PROCEDURES PERFORMED: Robotic-assisted laparoscopic radical prostatectomy, bilateral pelvic lymphadenectomy  SURGEON: Alan Sin MD - Present and scrubbed for the entire procedure  ASSISTANT: Jhonny Peters MD  SECOND ASSISTANT: Nona Wells  ANESTHESIA: General with endotracheal intubation.   INDICATIONS FOR PROCEDURE: Brice Trevino is a 58 year-old gentleman with a history of Osseo 4+5 = 9 adenocarcinoma of the prostate. He has been counseled regarding treatment options and presents to undergo surgery.   Findings: Prostate and seminal vesicles removed without complication. Lymph nodes grossly negative. Water-tight anastomosis.  Specimens:   ID Type Source Tests Collected by Time Destination   A : right base margin of prostate  Tissue Prostate SURGICAL PATHOLOGY EXAM Alan Sin MD 1/18/2019  3:59 PM    B : right neurovascular bundle  Tissue Other SURGICAL PATHOLOGY EXAM Alan Sin MD 1/18/2019  4:09 PM    C : posterior margin prostate  Tissue Prostate SURGICAL PATHOLOGY EXAM Alan Sin MD 1/18/2019  4:10 PM    D : Prostate and seminal vesicles Tissue Prostate SURGICAL PATHOLOGY EXAM Alan Sin MD 1/18/2019  5:25 PM    E : right bladder neck margin  Tissue Bladder SURGICAL PATHOLOGY EXAM Alan Sin MD 1/18/2019  5:02 PM    F : pelvic lymph nodes  Tissue Pelvis SURGICAL PATHOLOGY EXAM Alan Sin MD 1/18/2019  5:21 PM      DESCRIPTION OF PROCEDURE: After fully informed voluntary consent was obtained, the patient was brought into the operating room, properly identified and placed in a dorsal lithotomy position on the table. General anesthesia was induced, endotracheal intubation performed, IV Ancef administered. The patient was then positioned appropriately on the table and all pressure points were padded and he  was secured to the table with tape. Once the positioning was performed, we proceeded with shaving, prepping and draping the abdomen in the usual sterile fashion. Pedraza was placed in the bladder in a sterile manner on the field. A midline incision about 2 cm above the umbilicus was made and a Veress needle introduced into the abdomen through this incision. Once the abdomen was accessed, it was confirmed using a saline drop test and the abdomen was insufflated. Once the abdomen was insufflated, additional robotic ports, 2 on the left and 1 on the right, were placed. A 12mm right-sided assistant port lateral to the robotic ports. Robot was then docked and surgery was commenced.   The sigmoid was adherent to the lateral pelvic sidewall and these adhesions were taken down sharply. A transverse incision was made posterior to the seminal vesicles. Denonvilliers' fascia was incised the rectum was mobilized way from the posterior prostate. The vas deferens on both sides was identified and divided with electrocautery and the seminal vesicles were carefully dissected out with the vasculature being controlled using Lapro clips. Once seminal vesicle and vas deferens were dissected all the way down to the prostate, attention was turned to bringing the bladder down off the anterior wall of the abdomen. Incisions were made lateral the medial umbilical ligaments and carried all the way up to the umbilicus and the urachus was divided horizontally. The bladder was mobilized away from the anterior abdominal wall and the the pelvic sidewalls on both sides. Endopelvic fascia was opened on each side and lateral prostate was dissected away from the levator musculature. An 0-Vicryl suture was placed to ligate the dorsal venous complex.  The anterior bladder neck was then incised at the base of the prostate and dissection was carried through the anterior urethra to identify the Pedraza catheter. The Pedraza was then pulled out through this  opening in the urethra to give upward traction on the prostate. Posterior bladder neck was then divided. Attention was then turned to the prostatic pedicles. A non-nerve-sparing procedure was performed. Partial nerve-sparing on the left. Patient's josh-prostatic tissues were notable for significant induration, thus nerve-sparing was deemed unfeasible in this operation. The prostatic pedicles were then divided using clips on both sides all the way to the apex. The dorsal venous complex was then divided with cautery. The anterior urethra and apex of prostate were dissected free. Of note, the posterior plane between the prostate and rectum was rather indurated. Urethra was divided and prostate was free. It was placed in an endocatch bag. Given josh-prostatic induration, several tissue margins were sent from the right base, right neurovascular bundle, and posterior plane. These all returned negative for malignancy and no prostatic tissue was present.  Prostatic fossa was carefully inspected for hemostasis. Pelvic lymph node dissection was performed removing all dante tissue between the external iliac vein, the pelvic floor, inguinal ligament up to the bifurcation of the common iliac vessels on both sides. Vesicourethral anastomosis was then performed in a standard fashion using two 3-0 V-lock sutures, the tail ends of which had been tied together. The first suture was taken at the 5 o'clock position through the bladder neck and taken through the posterior urethral plate and up the left side of the urethra and bladder to the 11 o'clock position. We then brought the right sided anastomotic suture through the urethra and up the right side of the urethra and bladder to the midline. The sutures were then tied down. A final Pedraza catheter was then placed and the bladder irrigated.  The anastomosis was tested with saline and found it to be leak free. An EndoCatch bag was used to retrieve the specimen and a 19 Lul drain was  placed in the pelvis. Robot was undocked and ports removed. Midline camera port incision was enlarged and the specimen retrieved. The fascia was reapproximated using interrupted figure-of-eight 0 Vicryl sutures on UR-6 needle. The skin of all the incisions was closed using running subcuticular closure with 4-0 Monocryl. Skin glue was placed.      The patient tolerated the procedure well. There were no complications. Blood loss was 250 mL. All sponge, needle and instrument counts were correct and doubly verified prior to closure. I was present and involved in the entire procedure.      Alan Sin MD  Urology  [facility]  Clinic Phone [phone]

## 2019-01-18 NOTE — PROGRESS NOTES
SPIRITUAL HEALTH SERVICES  SPIRITUAL ASSESSMENT Progress Note  [facility] Pre-Surgical    Visited pt per request for prayer prior to surgery.  Adan named a degree of apprehension about whether surgery will indicate the need for further interventions, such as radiation.  He named his hope for a good result.  Shared in prayer.    Rod Mcnamara M.Div.  Staff   Pager [phone]

## 2019-01-18 NOTE — ANESTHESIA PREPROCEDURE EVALUATION
Anesthesia Pre-Procedure Evaluation    Patient: Brice Trevino   MRN: [id number] : 1960          Preoperative Diagnosis: prostate cancer    Procedure(s):  robotic assisted laparoscopic radical prostatectomy bilateral pelvic lymphandectomy, possible open    Past Medical History:   Diagnosis Date     Blood glucose elevated      Dermatitis      Gastroesophageal reflux disease      Inguinal hernia     right very small     Prostate cancer (H)      Past Surgical History:   Procedure Laterality Date     COLONOSCOPY  2013    Procedure: COLONOSCOPY;  Colonoscopy;  Surgeon: Yovany Alcaraz MD;  Location:  GI     RHINOPLASTY       Anesthesia Evaluation     . Pt has had prior anesthetic. Type: General    No history of anesthetic complications          ROS/MED HX    ENT/Pulmonary:  - neg pulmonary ROS     Neurologic:  - neg neurologic ROS     Cardiovascular:  - neg cardiovascular ROS       METS/Exercise Tolerance:     Hematologic:  - neg hematologic  ROS       Musculoskeletal:  - neg musculoskeletal ROS       GI/Hepatic:  - neg GI/hepatic ROS   (+) GERD Asymptomatic on medication,       Renal/Genitourinary:  - ROS Renal section negative       Endo:  - neg endo ROS       Psychiatric:  - neg psychiatric ROS       Infectious Disease:         Malignancy:   (+) Malignancy History of Prostate  Prostate CA Active status post,         Other:    - neg other ROS                      Physical Exam  Normal systems: cardiovascular, pulmonary and dental    Airway   Mallampati: II  TM distance: >3 FB  Neck ROM: full    Dental     Cardiovascular       Pulmonary             Lab Results   Component Value Date    HGB 14.3 2019     2018    POTASSIUM 4.4 2018    CHLORIDE 106 2018    CO2 31 2018    BUN 11 2018    CR 0.93 2018     (H) 2018    LOBITO 9.4 2018    ALBUMIN 4.2 2018    PROTTOTAL 7.3 2018    ALT 39 2018    AST 18 2018    ALKPHOS 71  "05/04/2018    BILITOTAL 0.5 05/04/2018       Preop Vitals  BP Readings from Last 3 Encounters:   01/18/19 132/82   01/15/19 115/62   12/17/18 110/60    Pulse Readings from Last 3 Encounters:   01/18/19 78   01/15/19 74   12/10/18 101      Resp Readings from Last 3 Encounters:   01/18/19 18   01/15/19 16   11/09/18 16    SpO2 Readings from Last 3 Encounters:   01/18/19 98%   01/15/19 97%   12/10/18 93%      Temp Readings from Last 1 Encounters:   01/18/19 97.7  F (36.5  C) (Temporal)    Ht Readings from Last 1 Encounters:   01/18/19 1.727 m (5' 8\")      Wt Readings from Last 1 Encounters:   01/18/19 84.8 kg (187 lb)    Estimated body mass index is 28.43 kg/m  as calculated from the following:    Height as of this encounter: 1.727 m (5' 8\").    Weight as of this encounter: 84.8 kg (187 lb).       Anesthesia Plan      History & Physical Review  History and physical reviewed and following examination; no interval change.    ASA Status:  2 .    NPO Status:  > 8 hours    Plan for General and ETT with Intravenous and Propofol induction. Maintenance will be Balanced.    PONV prophylaxis:  Ondansetron (or other 5HT-3) and Dexamethasone or Solumedrol       Postoperative Care  Postoperative pain management:  IV analgesics.      Consents  Anesthetic plan, risks, benefits and alternatives discussed with:  Patient or representative and Patient..                 Michael Scott MD                    .  "

## 2019-01-19 LAB
ANION GAP SERPL CALCULATED.3IONS-SCNC: 6 MMOL/L (ref 3–14)
BUN SERPL-MCNC: 12 MG/DL (ref 7–30)
CALCIUM SERPL-MCNC: 8.3 MG/DL (ref 8.5–10.1)
CHLORIDE SERPL-SCNC: 107 MMOL/L (ref 94–109)
CO2 SERPL-SCNC: 29 MMOL/L (ref 20–32)
CREAT FLD-MCNC: 0.7 MG/DL
CREAT SERPL-MCNC: 0.84 MG/DL (ref 0.66–1.25)
ERYTHROCYTE [DISTWIDTH] IN BLOOD BY AUTOMATED COUNT: 12.4 % (ref 10–15)
GFR SERPL CREATININE-BSD FRML MDRD: >90 ML/MIN/{1.73_M2}
GLUCOSE SERPL-MCNC: 133 MG/DL (ref 70–99)
HCT VFR BLD AUTO: 37.5 % (ref 40–53)
HGB BLD-MCNC: 12.4 G/DL (ref 13.3–17.7)
MCH RBC QN AUTO: 31.9 PG (ref 26.5–33)
MCHC RBC AUTO-ENTMCNC: 33.1 G/DL (ref 31.5–36.5)
MCV RBC AUTO: 96 FL (ref 78–100)
PLATELET # BLD AUTO: 169 10E9/L (ref 150–450)
POTASSIUM SERPL-SCNC: 4.3 MMOL/L (ref 3.4–5.3)
RBC # BLD AUTO: 3.89 10E12/L (ref 4.4–5.9)
SODIUM SERPL-SCNC: 142 MMOL/L (ref 133–144)
SPECIMEN SOURCE FLD: NORMAL
WBC # BLD AUTO: 10.1 10E9/L (ref 4–11)

## 2019-01-19 PROCEDURE — 82570 ASSAY OF URINE CREATININE: CPT | Performed by: UROLOGY

## 2019-01-19 PROCEDURE — 25000128 H RX IP 250 OP 636: Performed by: UROLOGY

## 2019-01-19 PROCEDURE — 25000132 ZZH RX MED GY IP 250 OP 250 PS 637: Performed by: UROLOGY

## 2019-01-19 PROCEDURE — 80048 BASIC METABOLIC PNL TOTAL CA: CPT | Performed by: UROLOGY

## 2019-01-19 PROCEDURE — 36415 COLL VENOUS BLD VENIPUNCTURE: CPT | Performed by: UROLOGY

## 2019-01-19 PROCEDURE — 85027 COMPLETE CBC AUTOMATED: CPT | Performed by: UROLOGY

## 2019-01-19 RX ORDER — HEPARIN SODIUM 5000 [USP'U]/.5ML
5000 INJECTION, SOLUTION INTRAVENOUS; SUBCUTANEOUS EVERY 8 HOURS
Status: DISCONTINUED | OUTPATIENT
Start: 2019-01-19 | End: 2019-01-20 | Stop reason: HOSPADM

## 2019-01-19 RX ORDER — AMOXICILLIN 250 MG
1 CAPSULE ORAL 2 TIMES DAILY PRN
Qty: 20 TABLET | Refills: 0 | Status: SHIPPED | OUTPATIENT
Start: 2019-01-19 | End: 2020-11-03

## 2019-01-19 RX ORDER — FLUORIDE TOOTHPASTE
15 TOOTHPASTE DENTAL 4 TIMES DAILY PRN
Status: DISCONTINUED | OUTPATIENT
Start: 2019-01-19 | End: 2019-01-20 | Stop reason: HOSPADM

## 2019-01-19 RX ORDER — CIPROFLOXACIN 500 MG/1
500 TABLET, FILM COATED ORAL ONCE
Qty: 1 TABLET | Refills: 0 | Status: SHIPPED | OUTPATIENT
Start: 2019-01-28 | End: 2019-07-12

## 2019-01-19 RX ORDER — AMOXICILLIN 250 MG
1 CAPSULE ORAL 2 TIMES DAILY
Status: DISCONTINUED | OUTPATIENT
Start: 2019-01-19 | End: 2019-01-20 | Stop reason: HOSPADM

## 2019-01-19 RX ORDER — OXYCODONE HYDROCHLORIDE 5 MG/1
5-10 TABLET ORAL
Qty: 20 TABLET | Refills: 0 | Status: SHIPPED | OUTPATIENT
Start: 2019-01-19 | End: 2019-07-12

## 2019-01-19 RX ADMIN — ACETAMINOPHEN 975 MG: 325 TABLET, FILM COATED ORAL at 20:33

## 2019-01-19 RX ADMIN — ACETAMINOPHEN 975 MG: 325 TABLET, FILM COATED ORAL at 13:37

## 2019-01-19 RX ADMIN — KETOROLAC TROMETHAMINE 15 MG: 30 INJECTION, SOLUTION INTRAMUSCULAR at 16:52

## 2019-01-19 RX ADMIN — Medication 0.4 MG: at 00:17

## 2019-01-19 RX ADMIN — PROCHLORPERAZINE EDISYLATE 10 MG: 5 INJECTION INTRAMUSCULAR; INTRAVENOUS at 03:34

## 2019-01-19 RX ADMIN — RANITIDINE 150 MG: 150 TABLET ORAL at 09:29

## 2019-01-19 RX ADMIN — STANDARDIZED SENNA CONCENTRATE AND DOCUSATE SODIUM 1 TABLET: 8.6; 5 TABLET, FILM COATED ORAL at 13:37

## 2019-01-19 RX ADMIN — ACETAMINOPHEN 975 MG: 325 TABLET, FILM COATED ORAL at 09:29

## 2019-01-19 RX ADMIN — Medication 1 MG: at 23:28

## 2019-01-19 RX ADMIN — HEPARIN SODIUM 5000 UNITS: 10000 INJECTION, SOLUTION INTRAVENOUS; SUBCUTANEOUS at 09:29

## 2019-01-19 RX ADMIN — HEPARIN SODIUM 5000 UNITS: 10000 INJECTION, SOLUTION INTRAVENOUS; SUBCUTANEOUS at 18:58

## 2019-01-19 RX ADMIN — KETOROLAC TROMETHAMINE 15 MG: 30 INJECTION, SOLUTION INTRAMUSCULAR at 09:29

## 2019-01-19 RX ADMIN — KETOROLAC TROMETHAMINE 15 MG: 30 INJECTION, SOLUTION INTRAMUSCULAR at 03:34

## 2019-01-19 RX ADMIN — STANDARDIZED SENNA CONCENTRATE AND DOCUSATE SODIUM 1 TABLET: 8.6; 5 TABLET, FILM COATED ORAL at 20:33

## 2019-01-19 NOTE — PROGRESS NOTES
Urology   No acute events overnight  Pain well controlled.  Tolerating clear liquids.  No n/v.  Not passing flatus.   Has not yet ambulated    AVSS  UOP 2150  BEBE 245    NAD  Abd soft.  NT.  ND.  Inc CDI.  No erythema or drainage.  Pedraza draining clear, yellow urine.  BEBE serosang  Ext NT, no edema    A/P 58 year old male POD #1 s/p RALP with bPLDN. Doing well.    - Pain control: PO pain medications prn  - Diet: as tolerated  -  drains/tubes: BEBE Cr pending; continue Pedraza  - Dispo: Home later today or tomorrow pending ROBF and pain control    Alan Sin MD  Urology  [facility]  Pager [phone]

## 2019-01-19 NOTE — PLAN OF CARE
Pt up SBA. LS clear. Weaned off O2. BS hypo. Some flatus, no BM. Incisions with derma bond. Pain controlled with sched tyl & toradol. Pedraza with adequate output, urine clear. BEBE. IVF d/c'd. Advanced to regular diet, tolerating, denies nausea. Amb in hallways, up in chair. Doesn't feel comfortable going home today d/t still very sleepy.

## 2019-01-19 NOTE — PLAN OF CARE
Pt returned from PACU 2200, very drowsy and still sedated from OR. VSS, pain intermittent, relief from alternating dilaudid and toradol. C/O intermittent nausea as well with some belching, no emesis. Compazine and zofran given. Pedraza patent, pink tinged. No gas yet, BS not audible.  Oriented to room and call light. Arousable and more alert towards morning, using call light appropriately.

## 2019-01-19 NOTE — ANESTHESIA CARE TRANSFER NOTE
Patient: Brice Trevino    Procedure(s):  robotic assisted laparoscopic radical prostatectomy bilateral pelvic lymphandectomy    Diagnosis: prostate cancer  Diagnosis Additional Information: No value filed.    Anesthesia Type:   General, ETT     Note:  Airway :ETT and T-piece  Patient transferred to:PACU  Comments: Michael Report: Identifed the Patient, Identified the Reponsible Provider, Reviewed the pertinent medical history, Discussed the surgical course, Reviewed Intra-OP anesthesia mangement and issues during anesthesia, Set expectations for post-procedure period and Allowed opportunity for questions and acknowledgement of understanding      Vitals: (Last set prior to Anesthesia Care Transfer)    CRNA VITALS  1/18/2019 1729 - 1/18/2019 1810      1/18/2019             Pulse:  71    SpO2:  98 %    Resp Rate (observed):  25                Electronically Signed By: LENARD Baca CRNA  January 18, 2019  6:10 PM

## 2019-01-19 NOTE — ANESTHESIA POSTPROCEDURE EVALUATION
Patient: Brice Trevino    Procedure(s):  robotic assisted laparoscopic radical prostatectomy bilateral pelvic lymphandectomy    Diagnosis:prostate cancer  Diagnosis Additional Information: Prostate cancer    Anesthesia Type:  General, ETT    Note:  Anesthesia Post Evaluation    Patient location during evaluation: PACU  Patient participation: Able to fully participate in evaluation  Level of consciousness: awake  Pain management: adequate  Airway patency: patent  Cardiovascular status: acceptable  Respiratory status: acceptable  Hydration status: acceptable  PONV: controlled     Anesthetic complications: None          Last vitals:  Vitals:    01/18/19 1845 01/18/19 1900 01/18/19 1915   BP: 118/76 113/70 117/70   Pulse: 57 57 52   Resp: 12 (!) 6 8   Temp:      SpO2: 100% 98% 99%         Electronically Signed By: Kirby Reilly MD  January 18, 2019  7:36 PM

## 2019-01-20 VITALS
RESPIRATION RATE: 16 BRPM | SYSTOLIC BLOOD PRESSURE: 117 MMHG | DIASTOLIC BLOOD PRESSURE: 69 MMHG | TEMPERATURE: 98.5 F | HEIGHT: 68 IN | BODY MASS INDEX: 28.34 KG/M2 | OXYGEN SATURATION: 97 % | HEART RATE: 84 BPM | WEIGHT: 187 LBS

## 2019-01-20 PROCEDURE — 25000128 H RX IP 250 OP 636: Performed by: UROLOGY

## 2019-01-20 PROCEDURE — 25000132 ZZH RX MED GY IP 250 OP 250 PS 637: Performed by: UROLOGY

## 2019-01-20 RX ADMIN — RANITIDINE 150 MG: 150 TABLET ORAL at 08:05

## 2019-01-20 RX ADMIN — ACETAMINOPHEN 975 MG: 325 TABLET, FILM COATED ORAL at 10:49

## 2019-01-20 RX ADMIN — ACETAMINOPHEN 975 MG: 325 TABLET, FILM COATED ORAL at 01:59

## 2019-01-20 RX ADMIN — HEPARIN SODIUM 5000 UNITS: 10000 INJECTION, SOLUTION INTRAVENOUS; SUBCUTANEOUS at 01:59

## 2019-01-20 RX ADMIN — STANDARDIZED SENNA CONCENTRATE AND DOCUSATE SODIUM 1 TABLET: 8.6; 5 TABLET, FILM COATED ORAL at 08:05

## 2019-01-20 NOTE — PLAN OF CARE
Nausea resolved. Pain controlled with scheduled tylenol. No BM, feels constipated. Renetta diet, aguila patent. BEBE-90mL. Incisions dermabond- CDI. Uneventful night. Slept poorly per report but no event or change in condition. Anticipate home today with assist of parents.

## 2019-01-20 NOTE — PROGRESS NOTES
Pt d/c'd home with stepmom. Discharge instructions given & verbalized understanding. Pedraza care instructions given. Discharge meds sent with pt.

## 2019-01-20 NOTE — PROGRESS NOTES
Urology   No acute events overnight  Pain well controlled.  Tolerating regular diet.  No n/v.  Passing flatus  Has not yet ambulated    AVSS  UOP 2450  BEBE 275    NAD  Abd soft.  NT.  ND.  Inc CDI.  No erythema or drainage.  Pedraza draining clear, yellow urine.  BEBE serosang  Ext NT, no edema    A/P 58 year old male POD #2 s/p RALP with bPLDN. Doing well.    - Pain control: PO pain medications prn  - Diet: as tolerated  -  drains/tubes: Remove BEBE; continue Pedraza  - Dispo: Home today    Alan Sin MD  Urology  [facility]  Pager [phone]

## 2019-01-23 LAB — COPATH REPORT: NORMAL

## 2019-01-24 ENCOUNTER — TELEPHONE (OUTPATIENT)
Dept: UROLOGY | Facility: CLINIC | Age: 59
End: 2019-01-24

## 2019-01-24 NOTE — TELEPHONE ENCOUNTER
Patient is calling with an up-date for Dr. Sin: Everything is going good.  No pain medication since yesterday, eating, drinking, voiding, passing stool, wound look good. Recovering good.  Ana Rosa SNEED  [facility]  January 24, 2019 10:46 AM

## 2019-01-28 ENCOUNTER — OFFICE VISIT (OUTPATIENT)
Dept: UROLOGY | Facility: CLINIC | Age: 59
End: 2019-01-28
Payer: COMMERCIAL

## 2019-01-28 VITALS
HEIGHT: 68 IN | WEIGHT: 187 LBS | DIASTOLIC BLOOD PRESSURE: 70 MMHG | SYSTOLIC BLOOD PRESSURE: 120 MMHG | BODY MASS INDEX: 28.34 KG/M2 | OXYGEN SATURATION: 97 %

## 2019-01-28 DIAGNOSIS — C61 PROSTATE CANCER (H): Primary | ICD-10-CM

## 2019-01-28 PROCEDURE — 99024 POSTOP FOLLOW-UP VISIT: CPT | Performed by: UROLOGY

## 2019-01-28 RX ORDER — PRENATAL VIT 91/IRON/FOLIC/DHA 28-975-200
COMBINATION PACKAGE (EA) ORAL
Refills: 1 | COMMUNITY
Start: 2018-11-09

## 2019-01-28 ASSESSMENT — MIFFLIN-ST. JEOR: SCORE: 1642.73

## 2019-01-28 NOTE — LETTER
1/28/2019     RE: Brice Trevino  [address]  [place] [address]     Dear Colleague,    Thank you for referring your patient, Brice Trevino, to the [facility] [place] [facility]. Please see a copy of my visit note below.      CHIEF COMPLAINT   It was my pleasure to see Brice Trevino who is a 58 year old male for follow-up of Prostate Cancer.      HPI   Brice Trevino is a very pleasant 58 year old male who presents with a history of Prostate Cancer.  RALP completed 1/18/2019. Mariano 5+4, pT3b with negative lymph nodes. Negative margins. SVI and EPE present. Intraductal carcinoma noted. Doing well since surgery. Pain controlled. Eating well.    Tumor        Histologic Type:   Adenocarcinoma (acinar, not otherwise specified)        [place] Pattern:             Primary Pattern:   Grade 5 (60%)             Secondary Pattern:   Grade 4 (40%).             Total Mariano Score:   9 (Grade group: 5)     Extent        Tumor Quantitation             Proportion (p%) of prostatic tissue involved by tumor:  Tumor   involving right > left lobes and   approximately 15% of the prostate area.             Tumor Size (dominant nodule): Approximately 4.5 cm.        Extraprostatic Extension:   Present: Focal  (0.8 mm) - Right   posterior inferior.        Seminal Vesicle Invasion:   Present: Right - muscle layer and   angiolymphatic invasion.     Margins:   Margins uninvolved by invasive carcinoma     Accessory Findings        Treatment Effect on Carcinoma:   Not identified        Lymph-Vascular Invasion:   Present        Perineural Invasion:   Present     Pathologic Staging (pTNM)        Primary Tumor (pT):    pT3b:  Seminal vesicle invasion        Regional Lymph Nodes (pN):   pN0: No regional lymph node metastasis             Number of Lymph Nodes Examined:   8             Number of Lymph Nodes Involved:     0        Distant Metastasis  "(pM):    pM N/A     Additional Pathologic Findings:  Intraductal carcinoma present.     PHYSICAL EXAM  Patient is a 58 year old  male   Vitals: Blood pressure 120/70, height 1.727 m (5' 8\"), weight 84.8 kg (187 lb), SpO2 97 %.  General Appearance Adult: Body mass index is 28.43 kg/m .  Alert, no acute distress, oriented  HENT: throat/mouth:normal, good dentition  Lungs: no respiratory distress, or pursed lip breathing  Heart: No obvious jugular venous distension present  Abdomen: soft, nontender, no organomegaly or masses; incisions healing well  Back: no CVAT  Musculoskeltal: extremities normal, no peripheral edema  Skin: no suspicious lesions or rashes  Neuro: Alert, oriented, speech and mentation normal  Psych: affect and mood normal  Gait: Normal  : Pedraza with clear urine return    ASSESSMENT and PLAN  58 year old male with Mariano 5+4=9 prostate cancer, pT3bN0, with +SVI, +EPE, but negative margins. Doing well. Pedraza removed today. Discussed expected ongoing recovery and activity restrictions. Patient would like referral to pelvic floor PT. We discussed his pathology report in detail, including adverse pathologic features of EPE, and SVI. We discussed briefly adjuvant vs salvage radiotherapy, but given negative margins and nodes, will defer for now and plan to discuss further with recovery of continence. He expresses understanding and will follow-up in 6 weeks with PSA.    - Pelvic floor PT  - Follow-up 6 weeks with PSA    Alan Sin MD  Urology  [facility]  Clinic Phone [phone]    "

## 2019-01-28 NOTE — NURSING NOTE
Chief Complaint   Patient presents with     Post-op Visit     pt is her for post op prostatectomy   Shira Corea CMA

## 2019-01-28 NOTE — PROGRESS NOTES
"  CHIEF COMPLAINT   It was my pleasure to see Brice Trevino who is a 58 year old male for follow-up of Prostate Cancer.      HPI   Brice Trevino is a very pleasant 58 year old male who presents with a history of Prostate Cancer.  RALP completed 1/18/2019. Mariano 5+4, pT3b with negative lymph nodes. Negative margins. SVI and EPE present. Intraductal carcinoma noted. Doing well since surgery. Pain controlled. Eating well.    Tumor        Histologic Type:   Adenocarcinoma (acinar, not otherwise specified)        [place] Pattern:             Primary Pattern:   Grade 5 (60%)             Secondary Pattern:   Grade 4 (40%).             Total Mariano Score:   9 (Grade group: 5)     Extent        Tumor Quantitation             Proportion (p%) of prostatic tissue involved by tumor:  Tumor   involving right > left lobes and   approximately 15% of the prostate area.             Tumor Size (dominant nodule): Approximately 4.5 cm.        Extraprostatic Extension:   Present: Focal  (0.8 mm) - Right   posterior inferior.        Seminal Vesicle Invasion:   Present: Right - muscle layer and   angiolymphatic invasion.     Margins:   Margins uninvolved by invasive carcinoma     Accessory Findings        Treatment Effect on Carcinoma:   Not identified        Lymph-Vascular Invasion:   Present        Perineural Invasion:   Present     Pathologic Staging (pTNM)        Primary Tumor (pT):    pT3b:  Seminal vesicle invasion        Regional Lymph Nodes (pN):   pN0: No regional lymph node metastasis             Number of Lymph Nodes Examined:   8             Number of Lymph Nodes Involved:     0        Distant Metastasis (pM):    pM N/A     Additional Pathologic Findings:  Intraductal carcinoma present.     PHYSICAL EXAM  Patient is a 58 year old  male   Vitals: Blood pressure 120/70, height 1.727 m (5' 8\"), weight 84.8 kg (187 lb), SpO2 97 %.  General Appearance Adult: Body mass index is 28.43 kg/m .  Alert, no acute distress, " oriented  HENT: throat/mouth:normal, good dentition  Lungs: no respiratory distress, or pursed lip breathing  Heart: No obvious jugular venous distension present  Abdomen: soft, nontender, no organomegaly or masses; incisions healing well  Back: no CVAT  Musculoskeltal: extremities normal, no peripheral edema  Skin: no suspicious lesions or rashes  Neuro: Alert, oriented, speech and mentation normal  Psych: affect and mood normal  Gait: Normal  : Pedraza with clear urine return    ASSESSMENT and PLAN  58 year old male with [place] 5+4=9 prostate cancer, pT3bN0, with +SVI, +EPE, but negative margins. Doing well. Pedraza removed today. Discussed expected ongoing recovery and activity restrictions. Patient would like referral to pelvic floor PT. We discussed his pathology report in detail, including adverse pathologic features of EPE, and SVI. We discussed briefly adjuvant vs salvage radiotherapy, but given negative margins and nodes, will defer for now and plan to discuss further with recovery of continence. He expresses understanding and will follow-up in 6 weeks with PSA.    - Pelvic floor PT  - Follow-up 6 weeks with PSA    Alan Sin MD  Urology  [facility]  Clinic Phone [phone]

## 2019-02-22 ENCOUNTER — THERAPY VISIT (OUTPATIENT)
Dept: PHYSICAL THERAPY | Facility: CLINIC | Age: 59
End: 2019-02-22
Payer: COMMERCIAL

## 2019-02-22 DIAGNOSIS — N39.46 MIXED STRESS AND URGE URINARY INCONTINENCE: ICD-10-CM

## 2019-02-22 DIAGNOSIS — M99.05 SOMATIC DYSFUNCTION OF PELVIS REGION: ICD-10-CM

## 2019-02-22 DIAGNOSIS — Z90.79 S/P PROSTATECTOMY: ICD-10-CM

## 2019-02-22 PROCEDURE — 97161 PT EVAL LOW COMPLEX 20 MIN: CPT | Mod: GP | Performed by: PHYSICAL THERAPIST

## 2019-02-22 PROCEDURE — 97110 THERAPEUTIC EXERCISES: CPT | Mod: GP | Performed by: PHYSICAL THERAPIST

## 2019-02-22 PROCEDURE — 97535 SELF CARE MNGMENT TRAINING: CPT | Mod: GP | Performed by: PHYSICAL THERAPIST

## 2019-02-22 NOTE — PROGRESS NOTES
[facility] Initial Evaluation  Subjective:  The history is provided by the patient. No  was used.   Brice Trevino is a 58 year old male with a incontinence and other (S/P prostatectomy) condition.  Condition occurred with:  After surgery.    This is a new condition  Reports being diagnosed with prostate cancer in 11/2018. Reports  onset of urinary incontinence following a prostatectomy on 1/18/19. He reports using 1 pull on pad in daytime and 1 at night. He is voiding on toilet every 1 1/2 hours, able to start urine flow easily with a steady urine flow.   MD order for PT dated 1/28/19.    Patient reports pain:  Other (none).           Symptoms are exacerbated by coughing, sneezing, walking and other (sit to stand) and relieved by other (doing kegel).  Since onset symptoms are gradually improving.        General health as reported by patient is good.  Pertinent medical history includes:  Cancer, depression, incontinence and migraines/headaches.  Medical allergies: none.  Other surgeries include:  None.  Current medications:  Pain medication.  Current occupation is .  Patient is working in normal job without restrictions.      Barriers include:  None as reported by the patient.    Red flags:  None as reported by the patient.                        Objective:  System                                 Pelvic Dysfunction Evaluation:    Bladder/Pelvic Problems:    Storage Problem:  Mixed incontinence            Abdominal Wall:  Abdominal wall pelvic: fair TrA activation in supine with verbal cues.      Scar Mobility:  Scars well healed, non tender        External Assessment:    Skin Condition:  Normal      Tissue Symmetry:  Normal    Muscle Contraction/Perineal Mobility:  Elevation and urogential triangle descent    Additional History:        Caffeine Consumption:  12 oz                      General     ROS    Assessment/Plan:    Patient is a 58 year old male with  pelvic complaints.    Patient has the following significant findings with corresponding treatment plan.                Diagnosis 1:  Urinary incontinence, S/P prostatectomy  Decreased strength - therapeutic exercise, therapeutic activities and home program  Impaired muscle performance - biofeedback, neuro re-education and home program  Decreased function - therapeutic activities and home program    Therapy Evaluation Codes:   1) History comprised of:   Personal factors that impact the plan of care:      None.    Comorbidity factors that impact the plan of care are:      None.     Medications impacting care: None.  2) Examination of Body Systems comprised of:   Body structures and functions that impact the plan of care:      Pelvis.   Activity limitations that impact the plan of care are:      Frequency, Stress incontinence, Urge incontinence and Urinary incontinence.  3) Clinical presentation characteristics are:   Stable/Uncomplicated.  4) Decision-Making    Low complexity using standardized patient assessment instrument and/or measureable assessment of functional outcome.  Cumulative Therapy Evaluation is: Low complexity.    Previous and current functional limitations:  (See Goal Flow Sheet for this information)    Short term and Long term goals: (See Goal Flow Sheet for this information)     Communication ability:  Patient appears to be able to clearly communicate and understand verbal and written communication and follow directions correctly.  Treatment Explanation - The following has been discussed with the patient:   RX ordered/plan of care  Anticipated outcomes  Possible risks and side effects  This patient would benefit from PT intervention to resume normal activities.   Rehab potential is good.    Frequency:  1 X week, once daily  Duration:  for 2 weeks then 2x month for 2 months  Discharge Plan:  Achieve all LTG.  Independent in home treatment program.  Reach maximal therapeutic benefit.    Please refer to  the daily flowsheet for treatment today, total treatment time and time spent performing 1:1 timed codes.

## 2019-03-08 ENCOUNTER — THERAPY VISIT (OUTPATIENT)
Dept: PHYSICAL THERAPY | Facility: CLINIC | Age: 59
End: 2019-03-08
Payer: COMMERCIAL

## 2019-03-08 DIAGNOSIS — M99.05 SOMATIC DYSFUNCTION OF PELVIS REGION: ICD-10-CM

## 2019-03-08 DIAGNOSIS — N39.46 MIXED STRESS AND URGE URINARY INCONTINENCE: ICD-10-CM

## 2019-03-08 DIAGNOSIS — Z90.79 S/P PROSTATECTOMY: ICD-10-CM

## 2019-03-08 PROCEDURE — 97110 THERAPEUTIC EXERCISES: CPT | Mod: GP | Performed by: PHYSICAL THERAPIST

## 2019-03-08 PROCEDURE — 97530 THERAPEUTIC ACTIVITIES: CPT | Mod: GP | Performed by: PHYSICAL THERAPIST

## 2019-03-08 NOTE — PROGRESS NOTES
Subjective:  HPI                    Objective:  System    Physical Exam    General     ROS    Assessment/Plan:    SUBJECTIVE  Subjective changes as noted by pt: I'm having a lot less urine leakage in the past week. I'm using only 1 pull-on pad in a 24 hour period. I'm voiding about every 2 hours with a steady stream. I was doing the kegels 5x daily and noticed I was getting sore in the pelvic area so have not done them for a day.      Current pain level: 0/10     Changes in function:  Yes (See Goal flowsheet attached for changes in current functional level)     Adverse reaction to treatment or activity:  None    OBJECTIVE  Changes in objective findings: Increased pelvic floor muscle control with kegels and increased endurance.  Instructed in further PFM strengthening with roll outs, plie and core abdominal training.  Reviewed bladder diary and instruct in fluid/ diet management.           ASSESSMENT  Brice continues to require intervention to meet STG and LTG's: PT  Patient's symptoms are resolving.  Patient is progressing as expected.  Patient is becoming more independent in home exercise program  Response to therapy has shown an improvement in  muscle control, incontinence and function  Progress made towards STG/LTG?  Yes (See Goal flowsheet attached for updates on achievement of STG and LTG)    PLAN  Current treatment program is being advanced to more complex exercises.  The following procedures have been added:  strengthening, therapeutic activities and self cares/management    PTA/ATC plan:  N/A    Please refer to the daily flowsheet for treatment today, total treatment time and time spent performing 1:1 timed codes.    PROGRESS  REPORT    Progress reporting period is from 2/22/19 to 3/8/19.     SUBJECTIVE                   ;   ,     The subjective and objective information are from the last SOAP note on this patient.    OBJECTIVE   see objective findings in  SOAP note for this patient       ASSESSMENT/PLAN  Updated problem list and treatment plan: Diagnosis 1:  Urinary incontinence, S/P prostatectomy  Decreased strength - therapeutic exercise, therapeutic activities and home program  Impaired muscle performance - biofeedback, neuro re-education and home program  Decreased function - therapeutic activities and home program  Impaired posture - neuro re-education and home program  STG/LTGs have been met or progress has been made towards goals:  Yes (See Goal flow sheet completed today.)  Assessment of Progress: The patient's condition is improving.  Patient is meeting short term goals and is progressing towards long term goals.  Self Management Plans:  Patient has been instructed in a home treatment program.  Patient  has been instructed in self management of symptoms.  I have re-evaluated this patient and find that the nature, scope, duration and intensity of the therapy is appropriate for the medical condition of the patient.  Brice continues to require the following intervention to meet STG and LTG's: PT  The patient is returning to your office for a recheck appointment.    Recommendations:  This patient would benefit from continued therapy.     Frequency:  1 X a month, once daily  Duration:  for 2 months        Please refer to the daily flowsheet for treatment today, total treatment time and time spent performing 1:1 timed codes.

## 2019-03-11 ENCOUNTER — OFFICE VISIT (OUTPATIENT)
Dept: UROLOGY | Facility: CLINIC | Age: 59
End: 2019-03-11
Payer: COMMERCIAL

## 2019-03-11 VITALS
DIASTOLIC BLOOD PRESSURE: 68 MMHG | SYSTOLIC BLOOD PRESSURE: 118 MMHG | HEIGHT: 68 IN | BODY MASS INDEX: 28.34 KG/M2 | WEIGHT: 187 LBS

## 2019-03-11 DIAGNOSIS — Z90.79 S/P PROSTATECTOMY: ICD-10-CM

## 2019-03-11 DIAGNOSIS — C61 PROSTATE CANCER (H): Primary | ICD-10-CM

## 2019-03-11 DIAGNOSIS — Z85.46 PERSONAL HISTORY OF MALIGNANT NEOPLASM OF PROSTATE: Primary | ICD-10-CM

## 2019-03-11 LAB — PSA SERPL-MCNC: <0.04 NG/ML (ref 0–4)

## 2019-03-11 PROCEDURE — 99024 POSTOP FOLLOW-UP VISIT: CPT | Performed by: UROLOGY

## 2019-03-11 PROCEDURE — 36415 COLL VENOUS BLD VENIPUNCTURE: CPT | Performed by: UROLOGY

## 2019-03-11 PROCEDURE — 84153 ASSAY OF PSA TOTAL: CPT | Performed by: UROLOGY

## 2019-03-11 RX ORDER — SILDENAFIL CITRATE 20 MG/1
20 TABLET ORAL DAILY PRN
Qty: 30 TABLET | Refills: 3 | Status: SHIPPED | OUTPATIENT
Start: 2019-03-11 | End: 2020-06-12

## 2019-03-11 ASSESSMENT — PAIN SCALES - GENERAL: PAINLEVEL: NO PAIN (0)

## 2019-03-11 ASSESSMENT — MIFFLIN-ST. JEOR: SCORE: 1642.73

## 2019-03-11 NOTE — PROGRESS NOTES
"  CHIEF COMPLAINT   It was my pleasure to see Brice Trevino who is a 58 year old male for follow-up of Prostate Cancer.      HPI  Brice Trevino is a very pleasant 58 year old male who presents with a history of Prostate Cancer.  RALP completed 1/18/2019. [place] 5+4, pT3b with negative lymph nodes. Negative margins. SVI and EPE present. Intraductal carcinoma noted. Doing well since surgery. Significant improvements noted in urinary continence. Mild leakage. Doing well with PT. No significant erectile function yet.    PSA   3/11/2019 - <0.04     Tumor        Histologic Type:   Adenocarcinoma (acinar, not otherwise specified)        [place] Pattern:             Primary Pattern:   Grade 5 (60%)             Secondary Pattern:   Grade 4 (40%).             Total [place] Score:   9 (Grade group: 5)     Extent        Tumor Quantitation             Proportion (p%) of prostatic tissue involved by tumor:  Tumor   involving right > left lobes and   approximately 15% of the prostate area.             Tumor Size (dominant nodule): Approximately 4.5 cm.        Extraprostatic Extension:   Present: Focal  (0.8 mm) - Right   posterior inferior.        Seminal Vesicle Invasion:   Present: Right - muscle layer and   angiolymphatic invasion.     Margins:   Margins uninvolved by invasive carcinoma     Accessory Findings        Treatment Effect on Carcinoma:   Not identified        Lymph-Vascular Invasion:   Present        Perineural Invasion:   Present     Pathologic Staging (pTNM)        Primary Tumor (pT):    pT3b:  Seminal vesicle invasion        Regional Lymph Nodes (pN):   pN0: No regional lymph node metastasis             Number of Lymph Nodes Examined:   8             Number of Lymph Nodes Involved:     0        Distant Metastasis (pM):    pM N/A     Additional Pathologic Findings:  Intraductal carcinoma present.     PHYSICAL EXAM  Patient is a 58 year old  male   Vitals: Blood pressure 118/68, height 1.727 m (5' 8\"), weight " 84.8 kg (187 lb).  General Appearance Adult: Body mass index is 28.43 kg/m .  Alert, no acute distress, oriented  HENT: throat/mouth:normal, good dentition  Lungs: no respiratory distress, or pursed lip breathing  Heart: No obvious jugular venous distension present  Abdomen: soft, nontender, no organomegaly or masses; incisions well-healed  Back: no CVAT  Musculoskeltal: extremities normal, no peripheral edema  Skin: no suspicious lesions or rashes  Neuro: Alert, oriented, speech and mentation normal  Psych: affect and mood normal  Gait: Normal    ASSESSMENT and PLAN  58 year old male with [place] 5+4=9 prostate cancer, pT3bN0, with +SVI, +EPE, but negative margins. RALP 1/18/2019.  Doing well. No evidence of recurrence. Good progress with regard to continence. Will start sildenafil for ED and risks/side effects were discussed today.     - Sildenafil  - Follow-up 3 months with PSA    Alan Sin MD  Urology  [facility]  Clinic Phone [phone]

## 2019-04-05 ENCOUNTER — THERAPY VISIT (OUTPATIENT)
Dept: PHYSICAL THERAPY | Facility: CLINIC | Age: 59
End: 2019-04-05
Payer: COMMERCIAL

## 2019-04-05 DIAGNOSIS — N39.46 MIXED STRESS AND URGE URINARY INCONTINENCE: ICD-10-CM

## 2019-04-05 DIAGNOSIS — Z90.79 S/P PROSTATECTOMY: ICD-10-CM

## 2019-04-05 DIAGNOSIS — M99.05 SOMATIC DYSFUNCTION OF PELVIS REGION: ICD-10-CM

## 2019-04-05 PROCEDURE — 97530 THERAPEUTIC ACTIVITIES: CPT | Mod: GP | Performed by: PHYSICAL THERAPIST

## 2019-04-05 PROCEDURE — 97110 THERAPEUTIC EXERCISES: CPT | Mod: GP | Performed by: PHYSICAL THERAPIST

## 2019-04-05 NOTE — PROGRESS NOTES
"Subjective:  HPI                    Objective:  System    Physical Exam    General     ROS    Assessment/Plan:    SUBJECTIVE  Subjective changes as noted by pt:  I'm using 1 pad daily- min saturated pad. I have slept thru night a few nights and only up 1x night most to void. Steady stream of urine now, voiding every 2-3 hours. I'm doing the kegel 3x daily but not the other exercises.   Current pain level: 0/10     Changes in function:  Yes (See Goal flowsheet attached for changes in current functional level)     Adverse reaction to treatment or activity:  None    OBJECTIVE  Changes in objective findings:  Yes, well healed, non tender scars.  Reviewed HEP 15' with review of kegel 5-10\" holds 5x daily- standing more than sitting, instruct in bridge(gluts) and emphasis to neutral spine posture in standing and walking.       ASSESSMENT  Brice continues to require intervention to meet STG and LTG's: PT  Patient's symptoms are resolving.  Patient is progressing as expected.  Patient is becoming more independent in home exercise program  Response to therapy has shown an improvement in  strength, muscle control, incontinence and function  Progress made towards STG/LTG?  Yes (See Goal flowsheet attached for updates on achievement of STG and LTG)    PLAN  Current treatment program is being advanced to more complex exercises.  The following procedures have been added:  strengthening    PTA/ATC plan:  N/A    Please refer to the daily flowsheet for treatment today, total treatment time and time spent performing 1:1 timed codes.        "

## 2019-06-04 DIAGNOSIS — C61 PROSTATE CANCER (H): Primary | ICD-10-CM

## 2019-06-07 ENCOUNTER — THERAPY VISIT (OUTPATIENT)
Dept: PHYSICAL THERAPY | Facility: CLINIC | Age: 59
End: 2019-06-07
Payer: COMMERCIAL

## 2019-06-07 DIAGNOSIS — N39.46 MIXED STRESS AND URGE URINARY INCONTINENCE: ICD-10-CM

## 2019-06-07 DIAGNOSIS — M99.05 SOMATIC DYSFUNCTION OF PELVIS REGION: ICD-10-CM

## 2019-06-07 DIAGNOSIS — Z90.79 S/P PROSTATECTOMY: ICD-10-CM

## 2019-06-07 PROCEDURE — 97530 THERAPEUTIC ACTIVITIES: CPT | Mod: GP | Performed by: PHYSICAL THERAPIST

## 2019-06-07 PROCEDURE — 97110 THERAPEUTIC EXERCISES: CPT | Mod: GP | Performed by: PHYSICAL THERAPIST

## 2019-06-07 NOTE — PROGRESS NOTES
"Subjective:  HPI                    Objective:  System    Physical Exam    General     ROS    Assessment/Plan:    Discharge  REPORT    Progress reporting period is from 4/5/19 to 6/7/19.       SUBJECTIVE  Subjective changes noted by patient:  The urine leaking is very minimal now and I'm using only 1 pull on over a 1-2 day period with minimal wetness on the pad. I'm dry over night. I'm doing the kegels 1-2 times per day and the other exercises less frequently.       Current pain level is NA  .     Previous pain level was  NA  .   Changes in function:  Yes (See Goal flowsheet attached for changes in current functional level)  Adverse reaction to treatment or activity: None    OBJECTIVE  Changes noted in objective findings:  Yes, increased pelvic floor muscle endurance, holding kegels 10\" in sitting or standing positions.  Improved neutral sitting and standing posture without cues.  Improved activation of lower abdominal muscles with core exercises.  Reviewed HEP with emphasis to 3x daily kegels and 3-4x wkly core/gluteal exercises.  Advised increasing daily fluids to 30-50 oz.         ASSESSMENT/PLAN  Updated problem list and treatment plan: Diagnosis 1: urinary incontinence, S/P prostatectomy  Decreased function - therapeutic activities and home program  STG/LTGs have been met or progress has been made towards goals:  Yes (See Goal flow sheet completed today.)  Assessment of Progress: The patient's condition is improving.  Patient is meeting short term goals and is progressing towards long term goals.  Self Management Plans:  Patient has been instructed in a home treatment program.  Patient  has been instructed in self management of symptoms.  I have re-evaluated this patient and find that the nature, scope, duration and intensity of the therapy is appropriate for the medical condition of the patient.  Brice continues to require the following intervention to meet STG and LTG's:  PT    Recommendations:  This patient " is ready to be discharged from therapy and continue their home treatment program.    Please refer to the daily flowsheet for treatment today, total treatment time and time spent performing 1:1 timed codes.

## 2019-06-10 ENCOUNTER — OFFICE VISIT (OUTPATIENT)
Dept: UROLOGY | Facility: CLINIC | Age: 59
End: 2019-06-10
Payer: COMMERCIAL

## 2019-06-10 VITALS
HEIGHT: 69 IN | BODY MASS INDEX: 27.55 KG/M2 | DIASTOLIC BLOOD PRESSURE: 78 MMHG | WEIGHT: 186 LBS | OXYGEN SATURATION: 97 % | HEART RATE: 84 BPM | SYSTOLIC BLOOD PRESSURE: 100 MMHG

## 2019-06-10 DIAGNOSIS — C61 PROSTATE CANCER (H): ICD-10-CM

## 2019-06-10 LAB — PSA SERPL-MCNC: <0.04 NG/ML (ref 0–4)

## 2019-06-10 PROCEDURE — 84153 ASSAY OF PSA TOTAL: CPT | Performed by: UROLOGY

## 2019-06-10 PROCEDURE — 99213 OFFICE O/P EST LOW 20 MIN: CPT | Performed by: UROLOGY

## 2019-06-10 PROCEDURE — 36415 COLL VENOUS BLD VENIPUNCTURE: CPT | Performed by: UROLOGY

## 2019-06-10 ASSESSMENT — MIFFLIN-ST. JEOR: SCORE: 1646.13

## 2019-06-10 ASSESSMENT — PAIN SCALES - GENERAL: PAINLEVEL: NO PAIN (0)

## 2019-06-10 NOTE — PROGRESS NOTES
"  CHIEF COMPLAINT   It was my pleasure to see Brice Trevino who is a 58 year old male for follow-up of Prostate Cancer.      HPI  Brice Trevino is a very pleasant 58 year old male who presents with a history of Prostate Cancer.  RALP completed 1/18/2019. [place] 5+4, pT3b with negative lymph nodes. Negative margins. SVI and EPE present. Intraductal carcinoma noted. Doing well since surgery. Significant improvements noted in urinary continence. Mild leakage. Doing well with PT. Some early return of erectile function.     PSA   6/10/2019 - <0.04  3/11/2019 - <0.04     RALP 1/18/2019  Tumor        Histologic Type:   Adenocarcinoma (acinar, not otherwise specified)        [place] Pattern:             Primary Pattern:   Grade 5 (60%)             Secondary Pattern:   Grade 4 (40%).             Total [place] Score:   9 (Grade group: 5)   Margins:   Margins uninvolved by invasive carcinoma   Pathologic Staging (pTNM)        Primary Tumor (pT):    pT3b:  Seminal vesicle invasion        Regional Lymph Nodes (pN):   pN0: No regional lymph node metastasis             Number of Lymph Nodes Examined:   8             Number of Lymph Nodes Involved:     0        Distant Metastasis (pM):    pM N/A     Additional Pathologic Findings:  Intraductal carcinoma present.     PHYSICAL EXAM  Patient is a 58 year old  male   Vitals: Blood pressure 100/78, pulse 84, height 1.74 m (5' 8.5\"), weight 84.4 kg (186 lb), SpO2 97 %.  General Appearance Adult: Body mass index is 27.87 kg/m .  Alert, no acute distress, oriented  HENT: throat/mouth:normal, good dentition  Lungs: no respiratory distress, or pursed lip breathing  Heart: No obvious jugular venous distension present  Abdomen: soft, nontender, no organomegaly or masses; incisions well-healed  Back: no CVAT  Musculoskeltal: extremities normal, no peripheral edema  Skin: no suspicious lesions or rashes  Neuro: Alert, oriented, speech and mentation normal  Psych: affect and mood " normal  Gait: Normal    ASSESSMENT and PLAN  58 year old male with [place] 5+4=9 prostate cancer, pT3bN0, with +SVI, +EPE, but negative margins. RALP 1/18/2019.  Doing well. No evidence of recurrence. We again discussed today the implications of his high-risk disease and option of adjuvant radiotherapy, vs ongoing observation with early salvage radiation, if needed. At this point, he would prefer to avoid radiation, if possible. Good progress with regard to continence. Using sildenafil for ED and risks/side effects were discussed today.     - Follow-up 3 months with PSA    I spent over 15 minutes with the patient.  Over half this time was spent on counseling regarding Prostate Cancer.    Alan Sin MD  Urology  [facility]  Clinic Phone [phone]

## 2019-06-10 NOTE — NURSING NOTE
Chief Complaint   Patient presents with     Clinic Care Coordination - Follow-up     Pt here for same day PSA     Princess Astudillo

## 2019-07-12 ENCOUNTER — OFFICE VISIT (OUTPATIENT)
Dept: FAMILY MEDICINE | Facility: CLINIC | Age: 59
End: 2019-07-12
Payer: COMMERCIAL

## 2019-07-12 VITALS
TEMPERATURE: 98.4 F | HEIGHT: 69 IN | DIASTOLIC BLOOD PRESSURE: 69 MMHG | OXYGEN SATURATION: 96 % | RESPIRATION RATE: 16 BRPM | HEART RATE: 93 BPM | BODY MASS INDEX: 28.58 KG/M2 | WEIGHT: 193 LBS | SYSTOLIC BLOOD PRESSURE: 107 MMHG

## 2019-07-12 DIAGNOSIS — J20.9 ACUTE BRONCHITIS, UNSPECIFIED ORGANISM: Primary | ICD-10-CM

## 2019-07-12 PROCEDURE — 99213 OFFICE O/P EST LOW 20 MIN: CPT | Performed by: NURSE PRACTITIONER

## 2019-07-12 RX ORDER — AZITHROMYCIN 250 MG/1
TABLET, FILM COATED ORAL
Qty: 6 TABLET | Refills: 0 | Status: SHIPPED | OUTPATIENT
Start: 2019-07-12 | End: 2019-07-26

## 2019-07-12 RX ORDER — CODEINE PHOSPHATE AND GUAIFENESIN 10; 100 MG/5ML; MG/5ML
1-2 SOLUTION ORAL EVERY 6 HOURS PRN
Qty: 120 ML | Refills: 0 | Status: SHIPPED | OUTPATIENT
Start: 2019-07-12 | End: 2019-07-26

## 2019-07-12 ASSESSMENT — PAIN SCALES - GENERAL: PAINLEVEL: NO PAIN (0)

## 2019-07-12 ASSESSMENT — MIFFLIN-ST. JEOR: SCORE: 1677.88

## 2019-07-12 NOTE — PROGRESS NOTES
Subjective     Brice Trevino is a 58 year old male who presents to clinic today for the following health issues:    HPI   RESPIRATORY SYMPTOMS      Duration: one week    Description  rhinorrhea, headache, cough and fatigue/malaise    Severity: moderate    Accompanying signs and symptoms: patient states chest feels congested.    History (predisposing factors):  None. Just got back from [place]    Precipitating or alleviating factors: None    Therapies tried and outcome:  Robitussin with codeine ( old rx ) , benadryl    No chest pain or shortness of breath  Occasional wheezing  No fever      Patient Active Problem List   Diagnosis     Indigestion     Obesity     Migraine     Prostate cancer (H)     Past Surgical History:   Procedure Laterality Date     COLONOSCOPY  4/19/2013    Procedure: COLONOSCOPY;  Colonoscopy;  Surgeon: Yovany Alcaraz MD;  Location:  GI     DAVINCI PROSTATECTOMY, LYMPHADENECTOMY Bilateral 1/18/2019    Procedure: robotic assisted laparoscopic radical prostatectomy bilateral pelvic lymphandectomy;  Surgeon: Alan Sin MD;  Location: [facility] OR     PROSTATE SURGERY       RHINOPLASTY         Social History     Tobacco Use     Smoking status: Never Smoker     Smokeless tobacco: Never Used   Substance Use Topics     Alcohol use: No     Family History   Problem Relation Age of Onset     Diabetes Father         type 2     Prostate Cancer Father      Gastrointestinal Disease Mother         reflux     Early Death Mother      Eye Disorder Maternal Grandfather         cateracts     Heart Disease Paternal Grandfather         heart attack age 60's     Melanoma No family hx of      Skin Cancer No family hx of          Current Outpatient Medications   Medication Sig Dispense Refill     Acetaminophen (TYLENOL PO)        azithromycin (ZITHROMAX) 250 MG tablet Take 2 tablets (500 mg) by mouth daily for 1 day, THEN 1 tablet (250 mg) daily for 4 days. 6 tablet 0     fluocinonide (LIDEX) 0.05 %  "ointment DENILSON EXT AA BID  1     guaiFENesin-codeine (ROBITUSSIN AC) 100-10 MG/5ML solution Take 5-10 mLs by mouth every 6 hours as needed for cough 120 mL 0     RaNITidine HCl (RANITIDINE 150 MAX STRENGTH PO) Take 150 mg by mouth daily        senna-docusate (SENOKOT-S/PERICOLACE) 8.6-50 MG tablet Take 1 tablet by mouth 2 times daily as needed for constipation 20 tablet 0     sildenafil (REVATIO) 20 MG tablet Take 1 tablet (20 mg) by mouth daily as needed (sexual activity) Take 1-3 tabs, 30-60 mins prior to sexual activity. Do not take with nitrates. (Patient not taking: Reported on 7/12/2019) 30 tablet 3     terbinafine (LAMISIL) 1 % external cream DENILSON AA BID FOR FUNGAL INFECTION  1     Allergies   Allergen Reactions     No Known Drug Allergies          Reviewed and updated as needed this visit by Provider  Tobacco  Allergies  Meds  Problems  Med Hx  Surg Hx  Fam Hx         Review of Systems   ROS COMP: Constitutional, HEENT, cardiovascular, pulmonary, gi and gu systems are negative, except as otherwise noted.      Objective    /69 (BP Location: Left arm, Patient Position: Chair, Cuff Size: Adult Large)   Pulse 93   Temp 98.4  F (36.9  C) (Tympanic)   Resp 16   Ht 1.74 m (5' 8.5\")   Wt 87.5 kg (193 lb)   SpO2 96%   BMI 28.92 kg/m    Body mass index is 28.92 kg/m .  Physical Exam   GENERAL: healthy, alert and no distress  EYES: Eyes grossly normal to inspection, PERRL and conjunctivae and sclerae normal  HENT: ear canals and TM's normal, nose and mouth without ulcers or lesions  NECK: no adenopathy, no asymmetry, masses, or scars and thyroid normal to palpation  RESP: lungs clear to auscultation - no rales, rhonchi or wheezes  CV: regular rate and rhythm, normal S1 S2, no S3 or S4, no murmur, click or rub, no peripheral edema and peripheral pulses strong  MS: no gross musculoskeletal defects noted, no edema  PSYCH: mentation appears normal, affect normal/bright    Diagnostic Test Results:  none     " "    Assessment & Plan     1. Acute bronchitis, unspecified organism  Push fluids, rest  Start antibiotic today. Know that your cough may not be completely gone when you're done with your antibiotics  Can use Mucinex DM for productive cough or Robitussin with codeine  Follow up if not improving in 5 days  - azithromycin (ZITHROMAX) 250 MG tablet; Take 2 tablets (500 mg) by mouth daily for 1 day, THEN 1 tablet (250 mg) daily for 4 days.  Dispense: 6 tablet; Refill: 0  - guaiFENesin-codeine (ROBITUSSIN AC) 100-10 MG/5ML solution; Take 5-10 mLs by mouth every 6 hours as needed for cough  Dispense: 120 mL; Refill: 0     BMI:   Estimated body mass index is 28.92 kg/m  as calculated from the following:    Height as of this encounter: 1.74 m (5' 8.5\").    Weight as of this encounter: 87.5 kg (193 lb).           See Patient Instructions    Return in about 5 days (around 7/17/2019), or if symptoms worsen or fail to improve.    The benefits, risks and potential side effects were discussed in detail. Black box warnings discussed as relevant. All patient questions were answered. The patient was instructed to follow up immediately if any adverse reactions develop.    Return precautions discussed, including when to seek urgent/emergent care.    Patient verbalizes understanding and agrees with plan of care. Patient stable for discharge.      LENARD Love [facility]      "

## 2019-07-12 NOTE — PATIENT INSTRUCTIONS
Push fluids, rest  Start antibiotic today. Know that your cough may not be completely gone when you're done with your antibiotics  Can use Mucinex DM for productive cough or Robitussin with codeine  Follow up if not improving in 5 days    Patient Education     Patient Education     Bronchitis, Antibiotic Treatment (Adult)    Bronchitis is an infection of the air passages (bronchial tubes) in your lungs. It often occurs when you have a cold. This illness is contagious during the first few days and is spread through the air by coughing and sneezing, or by direct contact (touching the sick person and then touching your own eyes, nose, or mouth).  Symptoms of bronchitis include cough with mucus (phlegm) and low-grade fever. Bronchitis usually lasts 7 to 14 days. Mild cases can be treated with simple home remedies. More severe infection is treated with an antibiotic.  Home care  Follow these guidelines when caring for yourself at home:    If your symptoms are severe, rest at home for the first 2 to 3 days. When you go back to your usual activities, don't let yourself get too tired.    Don't smoke. Also stay away from secondhand smoke.    You may use over-the-counter medicines to control fever or pain, unless another medicine was prescribed. If you have chronic liver or kidney disease or have ever had a stomach ulcer or gastrointestinal bleeding, talk with your healthcare provider before using these medicines. Also talk to your provider if you are taking medicine to prevent blood clots. Aspirin should never be given to anyone younger than 18 who is ill with a viral infection or fever. It may cause severe liver or brain damage.    Your appetite may be low, so a light diet is fine. Stay well hydrated by drinking 6 to 8 glasses of fluids per day. This includes water, soft drinks, sports drinks, juices, tea, or soup. Extra fluids will help loosen mucus in your nose and lungs.    Over-the-counter cough, cold, and sore-throat  medicines will not shorten the length of the illness, but they may be helpful to reduce your symptoms. Don't use decongestants if you have high blood pressure.    Finish all antibiotic medicine. Do this even if you are feeling better after only a few days.  Follow-up care  Follow up with your healthcare provider, or as advised. If you had an X-ray or ECG (electrocardiogram), a specialist will review it. You will be told of any new test results that may affect your care.  If you are age 65 or older, if you smoke, or if you have a chronic lung disease or condition that affects your immune system, ask your healthcare provider about getting a pneumococcal vaccine and a yearly flu shot (influenza vaccine).  When to seek medical advice  Call your healthcare provider right away if any of these occur:    Fever of 100.4 F (38 C) or higher, or as directed by your healthcare provider    Coughing up more sputum    Weakness, drowsiness, headache, facial pain, ear pain, or a stiff neck  Call 911  Call 911 if any of these occur.    Coughing up blood    Weakness, drowsiness, headache, or stiff neck that get worse    Trouble breathing, wheezing, or pain with breathing  Date Last Reviewed: 6/1/2018 2000-2018 [employer]. [address] [place] [address], [place] [address]. All rights reserved. This information is not intended as a substitute for professional medical care. Always follow your healthcare professional's instructions.

## 2019-07-26 ENCOUNTER — OFFICE VISIT (OUTPATIENT)
Dept: FAMILY MEDICINE | Facility: CLINIC | Age: 59
End: 2019-07-26
Payer: COMMERCIAL

## 2019-07-26 VITALS
BODY MASS INDEX: 28.14 KG/M2 | TEMPERATURE: 99 F | RESPIRATION RATE: 16 BRPM | WEIGHT: 190 LBS | HEIGHT: 69 IN | DIASTOLIC BLOOD PRESSURE: 65 MMHG | HEART RATE: 74 BPM | OXYGEN SATURATION: 96 % | SYSTOLIC BLOOD PRESSURE: 104 MMHG

## 2019-07-26 DIAGNOSIS — J06.9 VIRAL URI WITH COUGH: ICD-10-CM

## 2019-07-26 PROCEDURE — 99213 OFFICE O/P EST LOW 20 MIN: CPT | Performed by: NURSE PRACTITIONER

## 2019-07-26 RX ORDER — CODEINE PHOSPHATE AND GUAIFENESIN 10; 100 MG/5ML; MG/5ML
1-2 SOLUTION ORAL EVERY 6 HOURS PRN
Qty: 120 ML | Refills: 0 | Status: SHIPPED | OUTPATIENT
Start: 2019-07-26 | End: 2020-01-21

## 2019-07-26 ASSESSMENT — PAIN SCALES - GENERAL: PAINLEVEL: NO PAIN (0)

## 2019-07-26 ASSESSMENT — MIFFLIN-ST. JEOR: SCORE: 1664.27

## 2019-07-26 NOTE — PATIENT INSTRUCTIONS
Push fluids, rest  Can try loratidine (Claritin) or cetirizine (Zyrtec)  If not improving my early next week, please MyChart me and we'll send in antibiotics

## 2019-07-26 NOTE — PROGRESS NOTES
Subjective     Brice Trevino is a 58 year old male who presents to clinic today for the following health issues:    HPI     Acute Illness   Acute illness concerns: cold sx's - runny nose, tired,weak, coughing, sneezing  Onset: 7/21/19    Fever: no    Chills/Sweats: no    Headache (location?): YES    Sinus Pressure:no    Conjunctivitis:  no    Ear Pain: no    Rhinorrhea: YES    Congestion: YES    Sore Throat: no     Cough: YES-has improved    Wheeze: no    Decreased Appetite: no    Nausea: no    Vomiting: no    Diarrhea:  no    Dysuria/Freq.: no    Fatigue/Achiness: YES    Sick/Strep Exposure: no     Therapies Tried and outcome: Zpack, Robitussin with Codeine, Mucinex, Benadryl    Runny nose, sneeze, some cough. Had bronchitis 2 weeks ago. Felt like he got mostly better. Worried about drainage into his lungs leading to pneumonia. He's been sleeping more which is helping.    Patient Active Problem List   Diagnosis     Indigestion     Obesity     Migraine     Prostate cancer (H)     Past Surgical History:   Procedure Laterality Date     COLONOSCOPY  4/19/2013    Procedure: COLONOSCOPY;  Colonoscopy;  Surgeon: Yovany Alcaraz MD;  Location:  GI     DAVINCI PROSTATECTOMY, LYMPHADENECTOMY Bilateral 1/18/2019    Procedure: robotic assisted laparoscopic radical prostatectomy bilateral pelvic lymphandectomy;  Surgeon: Alan Sin MD;  Location:  OR     PROSTATE SURGERY       RHINOPLASTY         Social History     Tobacco Use     Smoking status: Never Smoker     Smokeless tobacco: Never Used   Substance Use Topics     Alcohol use: No     Family History   Problem Relation Age of Onset     Diabetes Father         type 2     Prostate Cancer Father      Gastrointestinal Disease Mother         reflux     Early Death Mother      Eye Disorder Maternal Grandfather         cateracts     Heart Disease Paternal Grandfather         heart attack age 60's     Melanoma No family hx of      Skin Cancer No family  "hx of          Current Outpatient Medications   Medication Sig Dispense Refill     Acetaminophen (TYLENOL PO)        fluocinonide (LIDEX) 0.05 % ointment DENILSON EXT AA BID  1     guaiFENesin-codeine (ROBITUSSIN AC) 100-10 MG/5ML solution Take 5-10 mLs by mouth every 6 hours as needed for cough 120 mL 0     RaNITidine HCl (RANITIDINE 150 MAX STRENGTH PO) Take 150 mg by mouth daily        senna-docusate (SENOKOT-S/PERICOLACE) 8.6-50 MG tablet Take 1 tablet by mouth 2 times daily as needed for constipation 20 tablet 0     sildenafil (REVATIO) 20 MG tablet Take 1 tablet (20 mg) by mouth daily as needed (sexual activity) Take 1-3 tabs, 30-60 mins prior to sexual activity. Do not take with nitrates. 30 tablet 3     terbinafine (LAMISIL) 1 % external cream DENILSON AA BID FOR FUNGAL INFECTION  1     Allergies   Allergen Reactions     No Known Drug Allergies          Reviewed and updated as needed this visit by Provider  Tobacco  Allergies  Meds  Problems  Med Hx  Surg Hx  Fam Hx         Review of Systems   ROS COMP: Constitutional, HEENT, cardiovascular, pulmonary, GI, , musculoskeletal, neuro, skin, endocrine and psych systems are negative, except as otherwise noted.      Objective    /65 (BP Location: Left arm, Patient Position: Sitting, Cuff Size: Adult Regular)   Pulse 74   Temp 99  F (37.2  C) (Oral)   Resp 16   Ht 1.74 m (5' 8.5\")   Wt 86.2 kg (190 lb)   SpO2 96%   BMI 28.47 kg/m    Body mass index is 28.47 kg/m .  Physical Exam   GENERAL: healthy, alert and no distress  EYES: Eyes grossly normal to inspection, PERRL and conjunctivae and sclerae normal  HENT: ear canals and TM's normal, nose and mouth without ulcers or lesions  NECK: no adenopathy, no asymmetry, masses, or scars and thyroid normal to palpation  RESP: lungs clear to auscultation - no rales, rhonchi or wheezes  CV: regular rate and rhythm, normal S1 S2, no S3 or S4, no murmur, click or rub, no peripheral edema and peripheral pulses " "strong  MS: no gross musculoskeletal defects noted, no edema  PSYCH: mentation appears normal, affect normal/bright    Diagnostic Test Results:  Labs reviewed in Epic        Assessment & Plan     1. Viral URI with cough  Push fluids, rest  Can try loratidine (Claritin) or cetirizine (Zyrtec)  If not improving my early next week, please MyChart me and we'll send in antibiotics. I will do doxycycline if not improving as he'll have been sick >7 days and could be related to recent bronchitis.  - guaiFENesin-codeine (ROBITUSSIN AC) 100-10 MG/5ML solution; Take 5-10 mLs by mouth every 6 hours as needed for cough  Dispense: 120 mL; Refill: 0     BMI:   Estimated body mass index is 28.47 kg/m  as calculated from the following:    Height as of this encounter: 1.74 m (5' 8.5\").    Weight as of this encounter: 86.2 kg (190 lb).           See Patient Instructions    Return in about 5 days (around 7/31/2019), or if symptoms worsen or fail to improve.    The benefits, risks and potential side effects were discussed in detail. Black box warnings discussed as relevant. All patient questions were answered. The patient was instructed to follow up immediately if any adverse reactions develop.    Return precautions discussed, including when to seek urgent/emergent care.    Patient verbalizes understanding and agrees with plan of care. Patient stable for discharge.      LENARD Love [facility]        "

## 2019-08-15 PROBLEM — C61 PROSTATE CANCER (H): Status: ACTIVE | Noted: 2018-12-04

## 2019-09-06 DIAGNOSIS — C61 PROSTATE CANCER (H): Primary | ICD-10-CM

## 2019-09-09 ENCOUNTER — OFFICE VISIT (OUTPATIENT)
Dept: UROLOGY | Facility: CLINIC | Age: 59
End: 2019-09-09
Payer: COMMERCIAL

## 2019-09-09 VITALS
HEART RATE: 81 BPM | BODY MASS INDEX: 27.11 KG/M2 | OXYGEN SATURATION: 97 % | DIASTOLIC BLOOD PRESSURE: 78 MMHG | HEIGHT: 69 IN | WEIGHT: 183 LBS | SYSTOLIC BLOOD PRESSURE: 110 MMHG

## 2019-09-09 DIAGNOSIS — C61 PROSTATE CANCER (H): ICD-10-CM

## 2019-09-09 LAB — PSA SERPL-MCNC: <0.04 NG/ML (ref 0–4)

## 2019-09-09 PROCEDURE — 36415 COLL VENOUS BLD VENIPUNCTURE: CPT | Performed by: UROLOGY

## 2019-09-09 PROCEDURE — 99213 OFFICE O/P EST LOW 20 MIN: CPT | Performed by: UROLOGY

## 2019-09-09 PROCEDURE — 84153 ASSAY OF PSA TOTAL: CPT | Performed by: UROLOGY

## 2019-09-09 ASSESSMENT — MIFFLIN-ST. JEOR: SCORE: 1635.46

## 2019-09-09 ASSESSMENT — PAIN SCALES - GENERAL: PAINLEVEL: NO PAIN (0)

## 2019-09-09 NOTE — NURSING NOTE
Chief Complaint   Patient presents with     Clinic Care Coordination - Follow-up     Pt here for same day PSA     Princess Astudillo, LBIAN

## 2019-09-09 NOTE — PROGRESS NOTES
"  CHIEF COMPLAINT   It was my pleasure to see Brice Trevino who is a 59 year old male for follow-up of Prostate Cancer.      HPI  Brice Trevino is a very pleasant 59 year old male who presents with a history of Prostate Cancer.  RALP completed 1/18/2019. [place] 5+4, pT3b with negative lymph nodes. Negative margins. SVI and EPE present. Intraductal carcinoma noted. Doing well since surgery. Good return of continence. Having success with erections when using sildenafil. PSA remains undetectable and currently with no evidence of disease.     PSA   9/9/2019 - <0.04  6/10/2019 - <0.04  3/11/2019 - <0.04     RALP 1/18/2019  Tumor        Histologic Type:   Adenocarcinoma (acinar, not otherwise specified)        [place] Pattern:             Primary Pattern:   Grade 5 (60%)             Secondary Pattern:   Grade 4 (40%).             Total [place] Score:   9 (Grade group: 5)   Margins:   Margins uninvolved by invasive carcinoma   Pathologic Staging (pTNM)        Primary Tumor (pT):    pT3b:  Seminal vesicle invasion        Regional Lymph Nodes (pN):   pN0: No regional lymph node metastasis             Number of Lymph Nodes Examined:   8             Number of Lymph Nodes Involved:     0        Distant Metastasis (pM):    pM N/A     Additional Pathologic Findings:  Intraductal carcinoma present.     PHYSICAL EXAM  Patient is a 59 year old  male   Vitals: Blood pressure 110/78, pulse 81, height 1.753 m (5' 9\"), weight 83 kg (183 lb), SpO2 97 %.  General Appearance Adult: Body mass index is 27.02 kg/m .  Alert, no acute distress, oriented  HENT: throat/mouth:normal, good dentition  Lungs: no respiratory distress, or pursed lip breathing  Heart: No obvious jugular venous distension present  Abdomen: soft, nontender, no organomegaly or masses; incisions well-healed  Back: no CVAT  Musculoskeltal: extremities normal, no peripheral edema  Skin: no suspicious lesions or rashes  Neuro: Alert, oriented, speech and mentation " normal  Psych: affect and mood normal  Gait: Normal    ASSESSMENT and PLAN  59 year old male with Mariano 5+4=9 prostate cancer, pT3bN0, with +SVI, +EPE, but negative margins. RALP 1/18/2019.  Doing well. No evidence of recurrence. Good return of continence. Using sildenafil for ED and risks/side effects were discussed today.     - Follow-up 3 months with PSA    I spent over 15 minutes with the patient.  Over half this time was spent on counseling regarding prostate cancer.    Alan Sin MD  Urology  [facility]  Clinic Phone [phone]

## 2019-09-11 DIAGNOSIS — L30.9 DERMATITIS: ICD-10-CM

## 2019-09-11 RX ORDER — FLUOCINONIDE 0.5 MG/G
OINTMENT TOPICAL
Refills: 1 | Status: CANCELLED | OUTPATIENT
Start: 2019-09-11

## 2019-09-11 NOTE — TELEPHONE ENCOUNTER
Reason for Call:  Medication or medication refill:    Do you use a [facility]?  Name of the pharmacy and phone number for the current request:        [id number] - [place], [place] - [facility] AT [address] [address]  Phone: [phone] Fax: [phone]        Name of the medication requested: fluocinonide (LIDEX) 0.05 % ointment    Can we leave a detailed message on this number? YES    Phone number patient can be reached at: Home number on file [phone] (home)    Best Time: anytime    Call taken on 9/11/2019 at 3:08 PM by Audrey Regalado

## 2019-09-13 RX ORDER — FLUOCINONIDE 0.5 MG/G
OINTMENT TOPICAL 2 TIMES DAILY
Qty: 60 G | Refills: 1 | Status: SHIPPED | OUTPATIENT
Start: 2019-09-13 | End: 2022-04-28

## 2019-11-05 ENCOUNTER — HEALTH MAINTENANCE LETTER (OUTPATIENT)
Age: 59
End: 2019-11-05

## 2019-12-06 ENCOUNTER — TELEPHONE (OUTPATIENT)
Dept: FAMILY MEDICINE | Facility: CLINIC | Age: 59
End: 2019-12-06

## 2019-12-06 NOTE — TELEPHONE ENCOUNTER
Reason for Call:  Other New Pt Request    Detailed comments: Pt called requesting Dr Benedict for his PCP. Pt states Dr Benedict is his parents PCP.  Pt states that provider agreed to accept him. Please call pt to let him know if he will be accepted as a NP.     Phone Number Patient can be reached at: Home number on file [phone] (home)    Best Time: Anytime     Can we leave a detailed message on this number? YES    Call taken on 12/6/2019 at 1:39 PM by Roge Hernández

## 2019-12-13 ENCOUNTER — OFFICE VISIT (OUTPATIENT)
Dept: UROLOGY | Facility: CLINIC | Age: 59
End: 2019-12-13
Payer: COMMERCIAL

## 2019-12-13 VITALS
SYSTOLIC BLOOD PRESSURE: 110 MMHG | WEIGHT: 180 LBS | HEART RATE: 80 BPM | HEIGHT: 70 IN | OXYGEN SATURATION: 96 % | BODY MASS INDEX: 25.77 KG/M2 | DIASTOLIC BLOOD PRESSURE: 60 MMHG

## 2019-12-13 DIAGNOSIS — C61 PROSTATE CANCER (H): Primary | ICD-10-CM

## 2019-12-13 LAB — PSA SERPL-MCNC: <0.04 NG/ML (ref 0–4)

## 2019-12-13 PROCEDURE — 84153 ASSAY OF PSA TOTAL: CPT | Performed by: UROLOGY

## 2019-12-13 PROCEDURE — 99213 OFFICE O/P EST LOW 20 MIN: CPT | Performed by: UROLOGY

## 2019-12-13 PROCEDURE — 36415 COLL VENOUS BLD VENIPUNCTURE: CPT | Performed by: UROLOGY

## 2019-12-13 ASSESSMENT — MIFFLIN-ST. JEOR: SCORE: 1629.78

## 2019-12-13 ASSESSMENT — PAIN SCALES - GENERAL: PAINLEVEL: NO PAIN (0)

## 2019-12-13 NOTE — PROGRESS NOTES
"  CHIEF COMPLAINT   It was my pleasure to see Brice Trevino who is a 59 year old male for follow-up of Prostate Cancer.      HPI  Brice Trevino is a very pleasant 59 year old male who presents with a history of Prostate Cancer.  RALP completed 1/18/2019. [place] 5+4, pT3b with negative lymph nodes. Negative margins. SVI and EPE present. Intraductal carcinoma noted. Doing well since surgery. Good return of continence. Having success with erections when using sildenafil. PSA remains undetectable and currently with no evidence of disease.     PSA   12/13/2019 - <0.04  9/9/2019 - <0.04  6/10/2019 - <0.04  3/11/2019 - <0.04     RALP 1/18/2019  Tumor        Histologic Type:   Adenocarcinoma (acinar, not otherwise specified)        [place] Pattern:             Primary Pattern:   Grade 5 (60%)             Secondary Pattern:   Grade 4 (40%).             Total Mariano Score:   9 (Grade group: 5)   Margins:   Margins uninvolved by invasive carcinoma   Pathologic Staging (pTNM)        Primary Tumor (pT):    pT3b:  Seminal vesicle invasion        Regional Lymph Nodes (pN):   pN0: No regional lymph node metastasis             Number of Lymph Nodes Examined:   8             Number of Lymph Nodes Involved:     0        Distant Metastasis (pM):    pM N/A     Additional Pathologic Findings:  Intraductal carcinoma present.     PHYSICAL EXAM  Patient is a 59 year old  male   Vitals: Blood pressure 110/60, pulse 80, height 1.765 m (5' 9.5\"), weight 81.6 kg (180 lb), SpO2 96 %.  General Appearance Adult: Body mass index is 26.2 kg/m .  Alert, no acute distress, oriented  HENT: throat/mouth:normal, good dentition  Lungs: no respiratory distress, or pursed lip breathing  Heart: No obvious jugular venous distension present  Abdomen: soft, nontender, no organomegaly or masses  Back: no CVAT  Musculoskeltal: extremities normal, no peripheral edema  Skin: no suspicious lesions or rashes  Neuro: Alert, oriented, speech and mentation " normal  Psych: affect and mood normal  Gait: Normal    ASSESSMENT and PLAN  59 year old male with Mariano 5+4=9 prostate cancer, pT3bN0, with +SVI, +EPE, but negative margins. RALP 1/18/2019.  Doing well. No evidence of recurrence. We again discussed his high-risk of disease and importance of close surveillance with rationale for early salvage radiotherapy, if necessary. Good return of continence. Using sildenafil for ED and risks/side effects were discussed today.     - Follow-up 3 months with PSA    I spent over 15 minutes with the patient.  Over half this time was spent on counseling regarding Prostate Cancer.    Alan Sin MD  Urology  [facility]

## 2019-12-13 NOTE — NURSING NOTE
Chief Complaint   Patient presents with     Clinic Care Coordination - Follow-up     Pt here for same day PSA     Princess Astudillo, LIBAN

## 2020-01-21 ENCOUNTER — OFFICE VISIT (OUTPATIENT)
Dept: FAMILY MEDICINE | Facility: CLINIC | Age: 60
End: 2020-01-21
Payer: COMMERCIAL

## 2020-01-21 VITALS
SYSTOLIC BLOOD PRESSURE: 101 MMHG | HEIGHT: 70 IN | DIASTOLIC BLOOD PRESSURE: 67 MMHG | OXYGEN SATURATION: 97 % | TEMPERATURE: 97.7 F | WEIGHT: 180 LBS | HEART RATE: 81 BPM | BODY MASS INDEX: 25.77 KG/M2

## 2020-01-21 DIAGNOSIS — C61 PROSTATE CANCER (H): Primary | ICD-10-CM

## 2020-01-21 DIAGNOSIS — R73.9 BLOOD GLUCOSE ELEVATED: ICD-10-CM

## 2020-01-21 DIAGNOSIS — R93.1 ELEVATED CORONARY ARTERY CALCIUM SCORE: ICD-10-CM

## 2020-01-21 DIAGNOSIS — K21.9 GASTROESOPHAGEAL REFLUX DISEASE, ESOPHAGITIS PRESENCE NOT SPECIFIED: ICD-10-CM

## 2020-01-21 PROCEDURE — 99213 OFFICE O/P EST LOW 20 MIN: CPT | Performed by: INTERNAL MEDICINE

## 2020-01-21 ASSESSMENT — MIFFLIN-ST. JEOR: SCORE: 1629.78

## 2020-01-21 NOTE — PROGRESS NOTES
This is a very pleasant 59-year-old male here to establish care.  I reviewed reviewed his medical history with him.  As noted, he has a history of prostate cancer and has had regular urology follow-up with undetectable PSAs.  He is planning to start exercising trying to get his weight down.  He has a history of an elevated blood glucose.  His acid reflux is well controlled.    Past Medical History:   Diagnosis Date     Blood glucose elevated      Dermatitis      Elevated coronary artery calcium score 11/2018    16.8     Elevated coronary artery calcium score 11/1/2018     Gastroesophageal reflux disease      Inguinal hernia     right very small     Prostate cancer (H)     robotic surgery done 1/19 by Dr. Sin     Past Surgical History:   Procedure Laterality Date     COLONOSCOPY  4/19/2013    Procedure: COLONOSCOPY;  Colonoscopy;  Surgeon: Yovany Alcaraz MD;  Location:  GI     DAVINCI PROSTATECTOMY, LYMPHADENECTOMY Bilateral 1/18/2019    Procedure: robotic assisted laparoscopic radical prostatectomy bilateral pelvic lymphandectomy;  Surgeon: Alan Sin MD;  Location: [facility] OR     RHINOPLASTY       Social History     Socioeconomic History     Marital status: Single     Spouse name: Not on file     Number of children: 0     Years of education: Not on file     Highest education level: Not on file   Occupational History     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   Social Needs     Financial resource strain: Not on file     Food insecurity:     Worry: Not on file     Inability: Not on file     Transportation needs:     Medical: Not on file     Non-medical: Not on file   Tobacco Use     Smoking status: Never Smoker     Smokeless tobacco: Never Used   Substance and Sexual Activity     Alcohol use: No     Drug use: No     Sexual activity: Never   Lifestyle     Physical activity:     Days per week: Not on file     Minutes per session: Not on file     Stress: Not on file   Relationships     Social  connections:     Talks on phone: Not on file     Gets together: Not on file     Attends Sabianist service: Not on file     Active member of club or organization: Not on file     Attends meetings of clubs or organizations: Not on file     Relationship status: Not on file     Intimate partner violence:     Fear of current or ex partner: Not on file     Emotionally abused: Not on file     Physically abused: Not on file     Forced sexual activity: Not on file   Other Topics Concern     Parent/sibling w/ CABG, MI or angioplasty before 65F 55M? No   Social History Narrative    Dairy/d 2 servings/d.     Caffeine 2 servings/d    Exercise 5 x week    Sunscreen used - Yes    Seatbelts used - Yes    Working smoke/CO detectors in the home - Yes    Guns stored in the home - No    Self Breast Exams - No    Self Testicular Exam - No    Eye Exam up to date - Yes    Dental Exam up to date - Yes    Pap Smear up to date - NA    Mammogram up to date - NA    PSA up to date - No    Dexa Scan up to date - No    Flex Sig / Colonoscopy up to date - Yes    Immunizations up to date - No    Abuse: Current or Past(Physical, Sexual or Emotional)- No    Do you feel safe in your environment - Yes             Current Outpatient Medications   Medication Sig Dispense Refill     Famotidine 20 MG CHEW        fluocinonide (LIDEX) 0.05 % external ointment Apply topically 2 times daily 60 g 1     senna-docusate (SENOKOT-S/PERICOLACE) 8.6-50 MG tablet Take 1 tablet by mouth 2 times daily as needed for constipation 20 tablet 0     sildenafil (REVATIO) 20 MG tablet Take 1 tablet (20 mg) by mouth daily as needed (sexual activity) Take 1-3 tabs, 30-60 mins prior to sexual activity. Do not take with nitrates. 30 tablet 3     terbinafine (LAMISIL) 1 % external cream DENILSON AA BID FOR FUNGAL INFECTION  1     Allergies   Allergen Reactions     No Known Drug Allergies      FAMILY HISTORY NOTED AND REVIEWED    REVIEW OF SYSTEMS: above    PHYSICAL EXAM    /67 (BP  "Location: Left arm, Patient Position: Chair, Cuff Size: Adult Large)   Pulse 81   Temp 97.7  F (36.5  C) (Oral)   Ht 1.765 m (5' 9.5\")   Wt 81.6 kg (180 lb)   SpO2 97%   BMI 26.20 kg/m      Patient appears non toxic  No exam but discussed with patient above    ASSESSMENT:  1. Cap, follow up urol, melissa  2. Elevated sugar, exercise, diet and weight loss  3. Gerd, controlled    PLAN:  Follow up urol  Exercise, diet and weight loss  Follow up complete physical exam 3 months    Sai Benedict M.D.        "

## 2020-01-21 NOTE — PROGRESS NOTES
SUBJECTIVE:   CC: Brice Trevino is an 59 year old male who presents for preventative health visit.     Healthy Habits:     Getting at least 3 servings of Calcium per day:  NO    Bi-annual eye exam:  Yes    Dental care twice a year:  Yes    Sleep apnea or symptoms of sleep apnea:  Daytime drowsiness    Diet:  Regular (no restrictions)    Frequency of exercise:  None    Taking medications regularly:  Yes    Medication side effects:  None    PHQ-2 Total Score: 0    Additional concerns today:  Yes    {Add if <65 person on Medicare  - Required Questions (Optional):743947}  {Outside tests to abstract? :458826}    {additional problems to add (Optional):790343}    Today's PHQ-2 Score:   PHQ-2 ( 1999 Pfizer) 1/20/2020   Q1: Little interest or pleasure in doing things 0   Q2: Feeling down, depressed or hopeless 0   PHQ-2 Score 0   Q1: Little interest or pleasure in doing things Not at all   Q2: Feeling down, depressed or hopeless Not at all   PHQ-2 Score 0       Abuse: Current or Past(Physical, Sexual or Emotional)- { :803143}  Do you feel safe in your environment? { :632065}    Have you ever done Advance Care Planning? (For example, a Health Directive, POLST, or a discussion with a medical provider or your loved ones about your wishes): { :380523}    Social History     Tobacco Use     Smoking status: Never Smoker     Smokeless tobacco: Never Used   Substance Use Topics     Alcohol use: No     {Rooming Staff- Complete this question if Prescreen response is not shown below for today's visit. If you drink alcohol do you typically have >3 drinks per day or >7 drinks per week? (Optional):281362}    Alcohol Use 1/20/2020   Prescreen: >3 drinks/day or >7 drinks/week? Not Applicable   Prescreen: >3 drinks/day or >7 drinks/week? -   {add AUDIT responses (Optional) (A score of 7 for adult men is an indication of hazardous drinking; a score of 8 or more is an indication of an alcohol use disorder.  A score of 7 or more for adult  "women is an indication of hazardous drinking or an alchohol use disorder):438823}    Last PSA:   PSA   Date Value Ref Range Status   01/11/2019 6.24 (H) 0 - 4 ug/L Final     Comment:     Assay Method:  Chemiluminescence using Siemens Vista analyzer       Reviewed orders with patient. Reviewed health maintenance and updated orders accordingly - { :960633::\"Yes\"}  {Chronicprobdata (optional):297767}    Reviewed and updated as needed this visit by clinical staff         Reviewed and updated as needed this visit by Provider        {HISTORY OPTIONS (Optional):339057}    Review of Systems  {MALE ROS (Optional):995835::\"CONSTITUTIONAL: NEGATIVE for fever, chills, change in weight\",\"INTEGUMENTARY/SKIN: NEGATIVE for worrisome rashes, moles or lesions\",\"EYES: NEGATIVE for vision changes or irritation\",\"ENT: NEGATIVE for ear, mouth and throat problems\",\"RESP: NEGATIVE for significant cough or SOB\",\"CV: NEGATIVE for chest pain, palpitations or peripheral edema\",\"GI: NEGATIVE for nausea, abdominal pain, heartburn, or change in bowel habits\",\" male: negative for dysuria, hematuria, decreased urinary stream, erectile dysfunction, urethral discharge\",\"MUSCULOSKELETAL: NEGATIVE for significant arthralgias or myalgia\",\"NEURO: NEGATIVE for weakness, dizziness or paresthesias\",\"PSYCHIATRIC: NEGATIVE for changes in mood or affect\"}    OBJECTIVE:   There were no vitals taken for this visit.    Physical Exam  {Exam Choices (Optional):046174}    {Diagnostic Test Results (Optional):015402::\"Diagnostic Test Results:\",\"Labs reviewed in Epic\"}    ASSESSMENT/PLAN:   {Diag Picklist:315014}    COUNSELING:   {MALE COUNSELING MESSAGES:551972::\"Reviewed preventive health counseling, as reflected in patient instructions\"}    Estimated body mass index is 26.2 kg/m  as calculated from the following:    Height as of 12/13/19: 1.765 m (5' 9.5\").    Weight as of 12/13/19: 81.6 kg (180 lb).     {Weight Management Plan (ACO) Complete if BMI is abnormal-  " Ages 18-64  BMI >24.9.  Age 65+ with BMI <23 or >30 (Optional):698222}     reports that he has never smoked. He has never used smokeless tobacco.  {Tobacco Cessation -- Complete if patient is a smoker (Optional):032028}    Counseling Resources:  ATP IV Guidelines  Pooled Cohorts Equation Calculator  FRAX Risk Assessment  ICSI Preventive Guidelines  Dietary Guidelines for Americans, 2010  USDA's MyPlate  ASA Prophylaxis  Lung CA Screening    Sai Benedict MD  [facility]

## 2020-03-03 DIAGNOSIS — C61 PROSTATE CANCER (H): Primary | ICD-10-CM

## 2020-03-13 ENCOUNTER — OFFICE VISIT (OUTPATIENT)
Dept: UROLOGY | Facility: CLINIC | Age: 60
End: 2020-03-13
Payer: COMMERCIAL

## 2020-03-13 VITALS
HEART RATE: 85 BPM | WEIGHT: 184 LBS | BODY MASS INDEX: 27.25 KG/M2 | DIASTOLIC BLOOD PRESSURE: 64 MMHG | OXYGEN SATURATION: 97 % | SYSTOLIC BLOOD PRESSURE: 120 MMHG | HEIGHT: 69 IN

## 2020-03-13 DIAGNOSIS — C61 PROSTATE CANCER (H): ICD-10-CM

## 2020-03-13 LAB — PSA SERPL-MCNC: <0.04 NG/ML (ref 0–4)

## 2020-03-13 PROCEDURE — 84153 ASSAY OF PSA TOTAL: CPT | Performed by: UROLOGY

## 2020-03-13 PROCEDURE — 36415 COLL VENOUS BLD VENIPUNCTURE: CPT | Performed by: UROLOGY

## 2020-03-13 PROCEDURE — 99213 OFFICE O/P EST LOW 20 MIN: CPT | Performed by: UROLOGY

## 2020-03-13 ASSESSMENT — PAIN SCALES - GENERAL: PAINLEVEL: NO PAIN (0)

## 2020-03-13 ASSESSMENT — MIFFLIN-ST. JEOR: SCORE: 1632.06

## 2020-03-13 NOTE — PROGRESS NOTES
"  CHIEF COMPLAINT   It was my pleasure to see Brice Trevino who is a 59 year old male for follow-up of Prostate Cancer.      HPI  Brice Trevino is a very pleasant 59 year old male who presents with a history of Prostate Cancer.  RALP completed 1/18/2019. [place] 5+4, pT3b with negative lymph nodes. Negative margins. SVI and EPE present. Intraductal carcinoma noted.Good return of continence. Having success with erections when using sildenafil. PSA remains undetectable and currently with no evidence of disease.     PSA   3/13/2020 - <0.04  12/13/2019 - <0.04  9/9/2019 - <0.04  6/10/2019 - <0.04  3/11/2019 - <0.04     RALP 1/18/2019  Tumor        Histologic Type:   Adenocarcinoma (acinar, not otherwise specified)        [place] Pattern:             Primary Pattern:   Grade 5 (60%)             Secondary Pattern:   Grade 4 (40%).             Total Mariano Score:   9 (Grade group: 5)   Margins:   Margins uninvolved by invasive carcinoma   Pathologic Staging (pTNM)        Primary Tumor (pT):    pT3b:  Seminal vesicle invasion        Regional Lymph Nodes (pN):   pN0: No regional lymph node metastasis             Number of Lymph Nodes Examined:   8             Number of Lymph Nodes Involved:     0        Distant Metastasis (pM):    pM N/A     Additional Pathologic Findings:  Intraductal carcinoma present.     PHYSICAL EXAM  Patient is a 59 year old  male   Vitals: Blood pressure 120/64, pulse 85, height 1.74 m (5' 8.5\"), weight 83.5 kg (184 lb), SpO2 97 %.  General Appearance Adult: Body mass index is 27.57 kg/m .  Alert, no acute distress, oriented  HENT: throat/mouth:normal, good dentition  Lungs: no respiratory distress, or pursed lip breathing  Heart: No obvious jugular venous distension present  Abdomen: soft, nontender, no organomegaly or masses; incisions well-healed  Back: no CVAT  Musculoskeltal: extremities normal, no peripheral edema  Skin: no suspicious lesions or rashes  Neuro: Alert, oriented, speech and " mentation normal  Psych: affect and mood normal  Gait: Normal    ASSESSMENT and PLAN  59 year old male with Mariano 5+4=9 prostate cancer, pT3bN0, with +SVI, +EPE, but negative margins. RALP 1/18/2019.  Doing well. No evidence of recurrence. We again discussed his high-risk of disease and importance of close surveillance with rationale for early salvage radiotherapy, if necessary. Good return of continence. Using sildenafil for ED and risks/side effects were discussed today.     - Follow-up 3 months with PSA    I spent over 15 minutes with the patient.  Over half this time was spent on counseling regarding Prostate Cancer.    Alan Sin MD  Urology  [facility]

## 2020-06-09 DIAGNOSIS — C61 PROSTATE CANCER (H): Primary | ICD-10-CM

## 2020-06-09 LAB — PSA SERPL-MCNC: <0.04 NG/ML (ref 0–4)

## 2020-06-09 PROCEDURE — 36415 COLL VENOUS BLD VENIPUNCTURE: CPT | Performed by: UROLOGY

## 2020-06-09 PROCEDURE — 84153 ASSAY OF PSA TOTAL: CPT | Performed by: UROLOGY

## 2020-06-12 ENCOUNTER — VIRTUAL VISIT (OUTPATIENT)
Dept: UROLOGY | Facility: CLINIC | Age: 60
End: 2020-06-12
Payer: COMMERCIAL

## 2020-06-12 VITALS — BODY MASS INDEX: 26.07 KG/M2 | HEIGHT: 69 IN | WEIGHT: 176 LBS

## 2020-06-12 DIAGNOSIS — Z90.79 S/P PROSTATECTOMY: ICD-10-CM

## 2020-06-12 DIAGNOSIS — C61 PROSTATE CANCER (H): Primary | ICD-10-CM

## 2020-06-12 PROCEDURE — 99213 OFFICE O/P EST LOW 20 MIN: CPT | Mod: 95 | Performed by: UROLOGY

## 2020-06-12 RX ORDER — SILDENAFIL CITRATE 20 MG/1
20 TABLET ORAL DAILY PRN
Qty: 30 TABLET | Refills: 3 | Status: SHIPPED | OUTPATIENT
Start: 2020-06-12 | End: 2021-10-28

## 2020-06-12 ASSESSMENT — MIFFLIN-ST. JEOR: SCORE: 1595.77

## 2020-06-12 ASSESSMENT — PAIN SCALES - GENERAL: PAINLEVEL: NO PAIN (0)

## 2020-06-12 NOTE — PROGRESS NOTES
"Brice Trevino is a 59 year old male who is being evaluated via a billable video visit.      The patient has been notified of following:     \"This video visit will be conducted via a call between you and your physician/provider. We have found that certain health care needs can be provided without the need for an in-person physical exam.  This service lets us provide the care you need with a video conversation.  If a prescription is necessary we can send it directly to your pharmacy.  If lab work is needed we can place an order for that and you can then stop by our lab to have the test done at a later time.    Video visits are billed at different rates depending on your insurance coverage.  Please reach out to your insurance provider with any questions.    If during the course of the call the physician/provider feels a video visit is not appropriate, you will not be charged for this service.\"    If during the course of the call the physician/provider feels a video visit is not appropriate, you will not be charged for this service.\"     Patient has given verbal consent for Video visit? Yes text [phone]     Will anyone else be joining your video visit? No    Will anyone else be joining your video visit? No      CHIEF COMPLAINT   It was my pleasure to see Brice Trevino who is a 59 year old male for follow-up of Prostate Cancer.      HPI  Brice Trevino is a very pleasant 59 year old male who presents with a history of Prostate Cancer.  RALP completed 1/18/2019. [place] 5+4, pT3b with negative lymph nodes. Negative margins. SVI and EPE present. Intraductal carcinoma noted. Good return of continence. Having success with erections when using sildenafil. Considering trying 60 mg dose. PSA remains undetectable and currently with no evidence of disease.     PSA   6/9/2020 - <0.04  3/13/2020 - <0.04  12/13/2019 - <0.04  9/9/2019 - <0.04  6/10/2019 - <0.04  3/11/2019 - <0.04     RALP 1/18/2019  Tumor " "       Histologic Type:   Adenocarcinoma (acinar, not otherwise specified)        [place] Pattern:             Primary Pattern:   Grade 5 (60%)             Secondary Pattern:   Grade 4 (40%).             Total [place] Score:   9 (Grade group: 5)   Margins:   Margins uninvolved by invasive carcinoma   Pathologic Staging (pTNM)        Primary Tumor (pT):    pT3b:  Seminal vesicle invasion        Regional Lymph Nodes (pN):   pN0: No regional lymph node metastasis             Number of Lymph Nodes Examined:   8             Number of Lymph Nodes Involved:     0        Distant Metastasis (pM):    pM N/A     Additional Pathologic Findings:  Intraductal carcinoma present.          Allergies:   No known drug allergies         Review of Systems:  From intake questionnaire     Skin: negative  Eyes: negative  Ears/Nose/Throat: negative  Respiratory: No shortness of breath, dyspnea on exertion, cough, or hemoptysis  Cardiovascular: No chest pain or palpitations  Gastrointestinal: negative; no nausea/vomiting, constipation or diarrhea  Genitourinary: as per HPI  Musculoskeletal: negative  Neurologic: negative  Psychiatric: negative  Hematologic/Lymphatic/Immunologic: negative  Endocrine: negative         Physical Exam:     Patient is a 59 year old  male   Vitals: Height 1.74 m (5' 8.5\"), weight 79.8 kg (176 lb).  Constitutional: Body mass index is 26.37 kg/m .  Alert, no acute distress, oriented, conversant  Eyes: no scleral icterus; extraocular muscles intact  Respiratory: no respiratory distress, or pursed lip breathing  Musculoskeletal: normal range of motion  Skin: no notable lesions visible  Neuro: Alert, oriented, speech and mentation normal  Psych: affect and mood normal, alert and oriented to person, place and time         Assessment and Plan:     59 year old male with Mariano 5+4=9 prostate cancer, pT3bN0, with +SVI, +EPE, but negative margins. RALP 1/18/2019.  Doing well. No evidence of recurrence. We again discussed " his high-risk of disease and importance of close surveillance with rationale for early salvage radiotherapy, if necessary. Good return of continence with very minimal leakage. Going to try 60mg sildenafil, but having some success with lower doses.     - Follow-up 4 months with PSA    Video-Visit Details    Type of service:  Video Visit    Video Start Time: 8:30 AM  Video End Time: 8:43 AM    Originating Location (pt. Location): Home    Distant Location (provider location):  [facility] [place]     Platform used for Video Visit: Nicole Sin MD  Urology  [facility]

## 2020-10-23 ENCOUNTER — OFFICE VISIT (OUTPATIENT)
Dept: UROLOGY | Facility: CLINIC | Age: 60
End: 2020-10-23
Payer: COMMERCIAL

## 2020-10-23 VITALS
BODY MASS INDEX: 27.55 KG/M2 | HEART RATE: 78 BPM | HEIGHT: 69 IN | DIASTOLIC BLOOD PRESSURE: 68 MMHG | SYSTOLIC BLOOD PRESSURE: 114 MMHG | WEIGHT: 186 LBS

## 2020-10-23 DIAGNOSIS — C61 PROSTATE CANCER (H): Primary | ICD-10-CM

## 2020-10-23 LAB — PSA SERPL-MCNC: 0.08 NG/ML (ref 0–4)

## 2020-10-23 PROCEDURE — 99214 OFFICE O/P EST MOD 30 MIN: CPT | Performed by: UROLOGY

## 2020-10-23 PROCEDURE — 84153 ASSAY OF PSA TOTAL: CPT | Performed by: UROLOGY

## 2020-10-23 ASSESSMENT — PAIN SCALES - GENERAL: PAINLEVEL: NO PAIN (0)

## 2020-10-23 ASSESSMENT — MIFFLIN-ST. JEOR: SCORE: 1636.13

## 2020-10-23 NOTE — LETTER
"10/23/2020      RE: Brice Trevino  [address]  [facility] [address]         CHIEF COMPLAINT   It was my pleasure to see Brice Trevino who is a 60 year old male for follow-up of Prostate Cancer.      HPI  Brice Trevino is a very pleasant 60 year old male who presents with a history of Prostate Cancer.  RALP completed 1/18/2019. [place] 5+4, pT3b with negative lymph nodes. Negative margins. SVI and EPE present. Intraductal carcinoma noted. Good return of continence. Having success with erections when using sildenafil. Considering trying 60 mg dose.     PSA has now demonstrated early rise to 0.08. No new symptoms.     PSA   10/23/2020 - 0.08  6/9/2020 - <0.04  3/13/2020 - <0.04  12/13/2019 - <0.04  9/9/2019 - <0.04  6/10/2019 - <0.04  3/11/2019 - <0.04    RALP 1/18/2019  Tumor        Histologic Type:   Adenocarcinoma (acinar, not otherwise specified)        [place] Pattern:             Primary Pattern:   Grade 5 (60%)             Secondary Pattern:   Grade 4 (40%).             Total [place] Score:   9 (Grade group: 5)   Margins:   Margins uninvolved by invasive carcinoma   Pathologic Staging (pTNM)        Primary Tumor (pT):    pT3b:  Seminal vesicle invasion        Regional Lymph Nodes (pN):   pN0: No regional lymph node metastasis             Number of Lymph Nodes Examined:   8             Number of Lymph Nodes Involved:     0        Distant Metastasis (pM):    pM N/A     Additional Pathologic Findings:  Intraductal carcinoma present.     PHYSICAL EXAM  Patient is a 60 year old  male   Vitals: Blood pressure 114/68, pulse 78, height 1.74 m (5' 8.5\"), weight 84.4 kg (186 lb).  General Appearance Adult: Body mass index is 27.87 kg/m .  Alert, no acute distress, oriented  Lungs: no respiratory distress, or pursed lip breathing  Heart: No obvious jugular venous distension present  Abdomen: soft, nontender, no organomegaly or masses  Back: no CVAT  Musculoskeltal: extremities normal, no peripheral " edema  Skin: no suspicious lesions or rashes  Neuro: Alert, oriented, speech and mentation normal  Psych: affect and mood normal  Gait: Normal    ASSESSMENT and PLAN  60 year old male with Mariano 5+4=9 prostate cancer, pT3bN0, with +SVI, +EPE, but negative margins. RALP 1/18/2019.  Doing well. PSA has been undetectable, but now demonstrated rise to 0.08. We discussed this finding in detail today, in the context of his known high-risk features on prostatectomy with Mariano 9 disease, SVI and EPE. He did have negative margins. In this context, however, given good continence recovery and otherwise excellent health, he would be a candidate for salvage radiotherapy. We discussed this today and placed referral for radiation oncology. He will meet with them and plan to follow-up with me in 3 months with PSA.     - Radiation oncology referral  - Follow-up 3 months with PSA    I spent over 25 minutes with the patient.  Over half this time was spent on counseling regarding Prostate Cancer.    Alan Sin MD  Urology  [facility]

## 2020-10-23 NOTE — PROGRESS NOTES
"  CHIEF COMPLAINT   It was my pleasure to see Brice Trevino who is a 60 year old male for follow-up of Prostate Cancer.      HPI  Brice Trevino is a very pleasant 60 year old male who presents with a history of Prostate Cancer.  RALP completed 1/18/2019. [place] 5+4, pT3b with negative lymph nodes. Negative margins. SVI and EPE present. Intraductal carcinoma noted. Good return of continence. Having success with erections when using sildenafil. Considering trying 60 mg dose.     PSA has now demonstrated early rise to 0.08. No new symptoms.     PSA   10/23/2020 - 0.08  6/9/2020 - <0.04  3/13/2020 - <0.04  12/13/2019 - <0.04  9/9/2019 - <0.04  6/10/2019 - <0.04  3/11/2019 - <0.04    RALP 1/18/2019  Tumor        Histologic Type:   Adenocarcinoma (acinar, not otherwise specified)        [place] Pattern:             Primary Pattern:   Grade 5 (60%)             Secondary Pattern:   Grade 4 (40%).             Total Mariano Score:   9 (Grade group: 5)   Margins:   Margins uninvolved by invasive carcinoma   Pathologic Staging (pTNM)        Primary Tumor (pT):    pT3b:  Seminal vesicle invasion        Regional Lymph Nodes (pN):   pN0: No regional lymph node metastasis             Number of Lymph Nodes Examined:   8             Number of Lymph Nodes Involved:     0        Distant Metastasis (pM):    pM N/A     Additional Pathologic Findings:  Intraductal carcinoma present.     PHYSICAL EXAM  Patient is a 60 year old  male   Vitals: Blood pressure 114/68, pulse 78, height 1.74 m (5' 8.5\"), weight 84.4 kg (186 lb).  General Appearance Adult: Body mass index is 27.87 kg/m .  Alert, no acute distress, oriented  Lungs: no respiratory distress, or pursed lip breathing  Heart: No obvious jugular venous distension present  Abdomen: soft, nontender, no organomegaly or masses  Back: no CVAT  Musculoskeltal: extremities normal, no peripheral edema  Skin: no suspicious lesions or rashes  Neuro: Alert, oriented, speech and mentation " normal  Psych: affect and mood normal  Gait: Normal    ASSESSMENT and PLAN  60 year old male with Mariano 5+4=9 prostate cancer, pT3bN0, with +SVI, +EPE, but negative margins. RALP 1/18/2019.  Doing well. PSA has been undetectable, but now demonstrated rise to 0.08. We discussed this finding in detail today, in the context of his known high-risk features on prostatectomy with [place] 9 disease, SVI and EPE. He did have negative margins. In this context, however, given good continence recovery and otherwise excellent health, he would be a candidate for salvage radiotherapy. We discussed this today and placed referral for radiation oncology. He will meet with them and plan to follow-up with me in 3 months with PSA.     - Radiation oncology referral  - Follow-up 3 months with PSA    I spent over 25 minutes with the patient.  Over half this time was spent on counseling regarding Prostate Cancer.    Alan Sin MD  Urology  [facility]

## 2020-10-29 ENCOUNTER — TRANSFERRED RECORDS (OUTPATIENT)
Dept: HEALTH INFORMATION MANAGEMENT | Facility: CLINIC | Age: 60
End: 2020-10-29

## 2020-10-30 DIAGNOSIS — C61 PROSTATE CANCER (H): Primary | ICD-10-CM

## 2020-11-03 ENCOUNTER — ALLIED HEALTH/NURSE VISIT (OUTPATIENT)
Dept: UROLOGY | Facility: CLINIC | Age: 60
End: 2020-11-03
Payer: COMMERCIAL

## 2020-11-03 DIAGNOSIS — C61 PROSTATE CANCER (H): Primary | ICD-10-CM

## 2020-11-03 PROCEDURE — 99207 PR NO CHARGE NURSE ONLY: CPT

## 2020-11-03 PROCEDURE — 96402 CHEMO HORMON ANTINEOPL SQ/IM: CPT | Performed by: UROLOGY

## 2020-11-03 NOTE — PROGRESS NOTES
Brice Trevino comes into clinic today at the request of Dr Hook Ordering Provider for Med Injection only Eligard.        This service provided today was under the supervising provider of the day Dr Pruett, who was available if needed.  The following medication was given:     MEDICATION:  Eligard 45 mg  ROUTE: SQ  SITE: LLQ - Abd.  DOSE: 45 mg  LOT #: [id number]  : Ashley  EXPIRATION DATE: 05/2022  NDC#: 64019-940-33   Was there drug waste? No  Multi-dose vial: No    Patricia Arredondo LPN  November 3, 2020

## 2020-11-22 ENCOUNTER — HEALTH MAINTENANCE LETTER (OUTPATIENT)
Age: 60
End: 2020-11-22

## 2021-01-12 ENCOUNTER — TRANSFERRED RECORDS (OUTPATIENT)
Dept: HEALTH INFORMATION MANAGEMENT | Facility: CLINIC | Age: 61
End: 2021-01-12

## 2021-01-27 DIAGNOSIS — C61 PROSTATE CANCER (H): Primary | ICD-10-CM

## 2021-01-29 ENCOUNTER — OFFICE VISIT (OUTPATIENT)
Dept: UROLOGY | Facility: CLINIC | Age: 61
End: 2021-01-29
Payer: COMMERCIAL

## 2021-01-29 VITALS
WEIGHT: 185 LBS | OXYGEN SATURATION: 98 % | BODY MASS INDEX: 27.4 KG/M2 | HEART RATE: 81 BPM | SYSTOLIC BLOOD PRESSURE: 120 MMHG | HEIGHT: 69 IN | DIASTOLIC BLOOD PRESSURE: 80 MMHG

## 2021-01-29 DIAGNOSIS — C61 PROSTATE CANCER (H): Primary | ICD-10-CM

## 2021-01-29 LAB — PSA SERPL-MCNC: <0.04 NG/ML (ref 0–4)

## 2021-01-29 PROCEDURE — 99213 OFFICE O/P EST LOW 20 MIN: CPT | Performed by: UROLOGY

## 2021-01-29 PROCEDURE — 84153 ASSAY OF PSA TOTAL: CPT | Performed by: UROLOGY

## 2021-01-29 ASSESSMENT — MIFFLIN-ST. JEOR: SCORE: 1631.59

## 2021-01-29 ASSESSMENT — PAIN SCALES - GENERAL: PAINLEVEL: NO PAIN (0)

## 2021-01-29 NOTE — PROGRESS NOTES
"  CHIEF COMPLAINT   It was my pleasure to see Brice Trevino who is a 60 year old male for follow-up of Prostate Cancer.      HPI  Brice Trevino is a very pleasant 60 year old male who presents with a history of Prostate Cancer.  RALP completed 1/18/2019. [place] 5+4, pT3b with negative lymph nodes. Negative margins. SVI and EPE present. Intraductal carcinoma noted. Good return of continence, but still wears pad for safety.     As of last visit, PSA had elevated to 0.08. Eligard given 11/3/2020. Completed aslvage pelvic radiotherapy 1/2021. Has done very well with this with minimal side effects or symptoms. Has had a few hot flashes. PSA undetectable today.     PSA   1/292021 - <0.04  10/23/2020 - 0.08  6/9/2020 - <0.04  3/13/2020 - <0.04  12/13/2019 - <0.04  9/9/2019 - <0.04  6/10/2019 - <0.04  3/11/2019 - <0.04     RALP 1/18/2019  Tumor        Histologic Type:   Adenocarcinoma (acinar, not otherwise specified)        [place] Pattern:             Primary Pattern:   Grade 5 (60%)             Secondary Pattern:   Grade 4 (40%).             Total Mariano Score:   9 (Grade group: 5)   Margins:   Margins uninvolved by invasive carcinoma   Pathologic Staging (pTNM)        Primary Tumor (pT):    pT3b:  Seminal vesicle invasion        Regional Lymph Nodes (pN):   pN0: No regional lymph node metastasis             Number of Lymph Nodes Examined:   8             Number of Lymph Nodes Involved:     0        Distant Metastasis (pM):    pM N/A     Additional Pathologic Findings:  Intraductal carcinoma present.     PHYSICAL EXAM  Patient is a 60 year old  male   Vitals: Blood pressure 120/80, pulse 81, height 1.74 m (5' 8.5\"), weight 83.9 kg (185 lb), SpO2 98 %.  General Appearance Adult: Body mass index is 27.72 kg/m .  Alert, no acute distress, oriented  Lungs: no respiratory distress, or pursed lip breathing  Abdomen: soft, nontender, no organomegaly or masses  Back: no CVAT  Neuro: Alert, oriented, speech and mentation " normal  Psych: affect and mood normal    Outside and Past Medical records:  Review of external notes as documented above   Review of prior external note(s) from - [facility]  Review of the result(s) of each unique test - PSA    ASSESSMENT and PLAN  60 year old male with [place] 5+4=9 prostate cancer, pT3bN0, with +SVI, +EPE, but negative margins. RALP 1/18/2019.  Noted to have PSA rise at last visit to 0.08, and given high-risk features at prostatectomy, has now completed salvage radiotherapy with short-course hormones. Will not plan additional ADT at this point. We discussed the natural history of prostate cancer in this setting, and he will plan to follow-up in 3 months with PSA.    - Follow-up 3 months with PSA    Greater than 20 minutes spent on the date of the encounter doing chart review, history and exam, documentation and further activities as noted above.    Alan Sin MD  Urology  [facility]

## 2021-01-29 NOTE — NURSING NOTE
Chief Complaint   Patient presents with     Prostate Cancer     PSA review   Patient denies having any issues with voiding.  Patricia Arredondo LPN

## 2021-04-30 ENCOUNTER — OFFICE VISIT (OUTPATIENT)
Dept: UROLOGY | Facility: CLINIC | Age: 61
End: 2021-04-30
Payer: COMMERCIAL

## 2021-04-30 VITALS
HEIGHT: 68 IN | WEIGHT: 183 LBS | SYSTOLIC BLOOD PRESSURE: 120 MMHG | HEART RATE: 80 BPM | BODY MASS INDEX: 27.74 KG/M2 | DIASTOLIC BLOOD PRESSURE: 70 MMHG

## 2021-04-30 DIAGNOSIS — E29.1 HYPOGONADISM MALE: ICD-10-CM

## 2021-04-30 DIAGNOSIS — C61 PROSTATE CANCER (H): Primary | ICD-10-CM

## 2021-04-30 LAB — PSA SERPL-MCNC: <0.04 NG/ML (ref 0–4)

## 2021-04-30 PROCEDURE — 99213 OFFICE O/P EST LOW 20 MIN: CPT | Performed by: UROLOGY

## 2021-04-30 PROCEDURE — 84153 ASSAY OF PSA TOTAL: CPT | Performed by: UROLOGY

## 2021-04-30 ASSESSMENT — PAIN SCALES - GENERAL: PAINLEVEL: NO PAIN (0)

## 2021-04-30 ASSESSMENT — MIFFLIN-ST. JEOR: SCORE: 1614.58

## 2021-04-30 NOTE — PROGRESS NOTES
"  CHIEF COMPLAINT   It was my pleasure to see Brice Trevino who is a 60 year old male for follow-up of Prostate Cancer.    HPI  Brice Trevino is a very pleasant 60 year old male who presents with a history of Prostate Cancer.  RALP completed 1/18/2019. [place] 5+4, pT3b with negative lymph nodes. Negative margins. SVI and EPE present. Intraductal carcinoma noted. Good return of continence, but still wears pad for safety.     PSA had elevated to 0.08. Eligard given 11/3/2020. Completed aslvage pelvic radiotherapy 1/2021. Has done very well with this with minimal side effects or symptoms. Has had a few hot flashes. PSA undetectable today. No longer on ADT.     PSA   4/30/2021 - <0.04  1/292021 - <0.04  10/23/2020 - 0.08  6/9/2020 - <0.04  3/13/2020 - <0.04  12/13/2019 - <0.04  9/9/2019 - <0.04  6/10/2019 - <0.04  3/11/2019 - <0.04     RALP 1/18/2019  Tumor        Histologic Type:   Adenocarcinoma (acinar, not otherwise specified)        Mariano Pattern:             Primary Pattern:   Grade 5 (60%)             Secondary Pattern:   Grade 4 (40%).             Total [place] Score:   9 (Grade group: 5)   Margins:   Margins uninvolved by invasive carcinoma   Pathologic Staging (pTNM)        Primary Tumor (pT):    pT3b:  Seminal vesicle invasion        Regional Lymph Nodes (pN):   pN0: No regional lymph node metastasis             Number of Lymph Nodes Examined:   8             Number of Lymph Nodes Involved:     0        Distant Metastasis (pM):    pM N/A     Additional Pathologic Findings:  Intraductal carcinoma present.     PHYSICAL EXAM  Patient is a 60 year old  male   Vitals: Blood pressure 120/70, pulse 80, height 1.727 m (5' 8\"), weight 83 kg (183 lb).  General Appearance Adult: Body mass index is 27.83 kg/m .  Alert, no acute distress, oriented  Lungs: no respiratory distress, or pursed lip breathing  Abdomen: soft, nontender, no organomegaly or masses  Back: no CVAT  Neuro: Alert, oriented, speech and " mentation normal  Psych: affect and mood normal    Outside and Past Medical records:  Review of the result(s) of each unique test - PSA, pathology    ASSESSMENT and PLAN  60 year old male with Mariano 5+4=9 prostate cancer, pT3bN0, with +SVI, +EPE, but negative margins. RALP 1/18/2019.  Noted to have PSA rise at last visit to 0.08, and given high-risk features at prostatectomy, has now completed salvage radiotherapy with short-course hormones. Will not plan additional ADT at this point. We discussed the natural history of prostate cancer in this setting, and he will plan to follow-up in 6 months with PSA. He will consider referral to Dr. Pinto for ED depending on results with sildenafil over the next few months.     - Follow-up 6 months with PSA and testosterone    22 minutes spent on the date of the encounter doing chart review, history and exam, documentation and further activities as noted above.    Alan Sin MD  Urology  [facility]

## 2021-06-10 ENCOUNTER — OFFICE VISIT (OUTPATIENT)
Dept: FAMILY MEDICINE | Facility: CLINIC | Age: 61
End: 2021-06-10
Payer: COMMERCIAL

## 2021-06-10 VITALS
HEIGHT: 68 IN | HEART RATE: 84 BPM | TEMPERATURE: 97 F | BODY MASS INDEX: 28.04 KG/M2 | SYSTOLIC BLOOD PRESSURE: 102 MMHG | WEIGHT: 185 LBS | DIASTOLIC BLOOD PRESSURE: 70 MMHG | OXYGEN SATURATION: 97 %

## 2021-06-10 DIAGNOSIS — M79.675 GREAT TOE PAIN, LEFT: ICD-10-CM

## 2021-06-10 DIAGNOSIS — R21 RASH AND NONSPECIFIC SKIN ERUPTION: ICD-10-CM

## 2021-06-10 DIAGNOSIS — R73.9 BLOOD GLUCOSE ELEVATED: ICD-10-CM

## 2021-06-10 DIAGNOSIS — Z00.00 ENCOUNTER FOR ANNUAL PHYSICAL EXAM: Primary | ICD-10-CM

## 2021-06-10 DIAGNOSIS — R79.89 ABNORMAL CBC: ICD-10-CM

## 2021-06-10 DIAGNOSIS — E83.52 HIGH CALCIUM LEVELS: ICD-10-CM

## 2021-06-10 DIAGNOSIS — K21.9 GASTROESOPHAGEAL REFLUX DISEASE, UNSPECIFIED WHETHER ESOPHAGITIS PRESENT: ICD-10-CM

## 2021-06-10 DIAGNOSIS — B35.1 ONYCHOMYCOSIS: ICD-10-CM

## 2021-06-10 DIAGNOSIS — C61 PROSTATE CANCER (H): ICD-10-CM

## 2021-06-10 DIAGNOSIS — Z23 NEED FOR VACCINATION: ICD-10-CM

## 2021-06-10 DIAGNOSIS — R93.1 ELEVATED CORONARY ARTERY CALCIUM SCORE: ICD-10-CM

## 2021-06-10 LAB
ALBUMIN SERPL-MCNC: 3.8 G/DL (ref 3.4–5)
ALP SERPL-CCNC: 64 U/L (ref 40–150)
ALT SERPL W P-5'-P-CCNC: 39 U/L (ref 0–70)
ANION GAP SERPL CALCULATED.3IONS-SCNC: 3 MMOL/L (ref 3–14)
AST SERPL W P-5'-P-CCNC: 22 U/L (ref 0–45)
BASOPHILS # BLD AUTO: 0 10E9/L (ref 0–0.2)
BASOPHILS NFR BLD AUTO: 1 %
BILIRUB SERPL-MCNC: 0.5 MG/DL (ref 0.2–1.3)
BUN SERPL-MCNC: 17 MG/DL (ref 7–30)
CALCIUM SERPL-MCNC: 10.2 MG/DL (ref 8.5–10.1)
CHLORIDE SERPL-SCNC: 106 MMOL/L (ref 94–109)
CHOLEST SERPL-MCNC: 220 MG/DL
CO2 SERPL-SCNC: 32 MMOL/L (ref 20–32)
CREAT SERPL-MCNC: 0.96 MG/DL (ref 0.66–1.25)
DIFFERENTIAL METHOD BLD: ABNORMAL
EOSINOPHIL # BLD AUTO: 0.2 10E9/L (ref 0–0.7)
EOSINOPHIL NFR BLD AUTO: 7.1 %
ERYTHROCYTE [DISTWIDTH] IN BLOOD BY AUTOMATED COUNT: 12.4 % (ref 10–15)
GFR SERPL CREATININE-BSD FRML MDRD: 85 ML/MIN/{1.73_M2}
GLUCOSE SERPL-MCNC: 120 MG/DL (ref 70–99)
HBA1C MFR BLD: 6 % (ref 0–5.6)
HCT VFR BLD AUTO: 40.7 % (ref 40–53)
HDLC SERPL-MCNC: 56 MG/DL
HGB BLD-MCNC: 14.1 G/DL (ref 13.3–17.7)
LDLC SERPL CALC-MCNC: 143 MG/DL
LYMPHOCYTES # BLD AUTO: 0.7 10E9/L (ref 0.8–5.3)
LYMPHOCYTES NFR BLD AUTO: 24.3 %
MCH RBC QN AUTO: 33.3 PG (ref 26.5–33)
MCHC RBC AUTO-ENTMCNC: 34.6 G/DL (ref 31.5–36.5)
MCV RBC AUTO: 96 FL (ref 78–100)
MONOCYTES # BLD AUTO: 0.4 10E9/L (ref 0–1.3)
MONOCYTES NFR BLD AUTO: 13.9 %
NEUTROPHILS # BLD AUTO: 1.6 10E9/L (ref 1.6–8.3)
NEUTROPHILS NFR BLD AUTO: 53.7 %
NONHDLC SERPL-MCNC: 164 MG/DL
PLATELET # BLD AUTO: 189 10E9/L (ref 150–450)
POTASSIUM SERPL-SCNC: 4.2 MMOL/L (ref 3.4–5.3)
PROT SERPL-MCNC: 7.3 G/DL (ref 6.8–8.8)
RBC # BLD AUTO: 4.24 10E12/L (ref 4.4–5.9)
SODIUM SERPL-SCNC: 141 MMOL/L (ref 133–144)
TRIGL SERPL-MCNC: 107 MG/DL
WBC # BLD AUTO: 3 10E9/L (ref 4–11)
WBC # BLD AUTO: ABNORMAL 10E9/L (ref 4–11)

## 2021-06-10 PROCEDURE — 99396 PREV VISIT EST AGE 40-64: CPT | Mod: 25 | Performed by: INTERNAL MEDICINE

## 2021-06-10 PROCEDURE — 85004 AUTOMATED DIFF WBC COUNT: CPT | Performed by: INTERNAL MEDICINE

## 2021-06-10 PROCEDURE — 83036 HEMOGLOBIN GLYCOSYLATED A1C: CPT | Performed by: INTERNAL MEDICINE

## 2021-06-10 PROCEDURE — 80061 LIPID PANEL: CPT | Performed by: INTERNAL MEDICINE

## 2021-06-10 PROCEDURE — 90714 TD VACC NO PRESV 7 YRS+ IM: CPT | Performed by: INTERNAL MEDICINE

## 2021-06-10 PROCEDURE — 85027 COMPLETE CBC AUTOMATED: CPT | Performed by: INTERNAL MEDICINE

## 2021-06-10 PROCEDURE — 85048 AUTOMATED LEUKOCYTE COUNT: CPT | Mod: 59 | Performed by: INTERNAL MEDICINE

## 2021-06-10 PROCEDURE — 90471 IMMUNIZATION ADMIN: CPT | Performed by: INTERNAL MEDICINE

## 2021-06-10 PROCEDURE — 99213 OFFICE O/P EST LOW 20 MIN: CPT | Mod: 25 | Performed by: INTERNAL MEDICINE

## 2021-06-10 PROCEDURE — 36415 COLL VENOUS BLD VENIPUNCTURE: CPT | Performed by: INTERNAL MEDICINE

## 2021-06-10 PROCEDURE — 80053 COMPREHEN METABOLIC PANEL: CPT | Performed by: INTERNAL MEDICINE

## 2021-06-10 RX ORDER — TRIAMCINOLONE ACETONIDE 1 MG/G
CREAM TOPICAL 2 TIMES DAILY
Qty: 15 G | Refills: 0 | Status: SHIPPED | OUTPATIENT
Start: 2021-06-10 | End: 2022-01-21

## 2021-06-10 RX ORDER — TRIAMCINOLONE ACETONIDE 1 MG/G
CREAM TOPICAL 2 TIMES DAILY
COMMUNITY
End: 2022-04-28

## 2021-06-10 RX ORDER — ATORVASTATIN CALCIUM 20 MG/1
20 TABLET, FILM COATED ORAL DAILY
Qty: 90 TABLET | Refills: 3 | Status: SHIPPED | OUTPATIENT
Start: 2021-06-10 | End: 2022-07-18

## 2021-06-10 ASSESSMENT — MIFFLIN-ST. JEOR: SCORE: 1623.65

## 2021-06-10 NOTE — PROGRESS NOTES
Prior to immunization administration, verified patients identity using patient s name and date of birth. Please see Immunization Activity for additional information.     Screening Questionnaire for Adult Immunization    Are you sick today?   No   Do you have allergies to medications, food, a vaccine component or latex?   No   Have you ever had a serious reaction after receiving a vaccination?   No   Do you have a long-term health problem with heart, lung, kidney, or metabolic disease (e.g., diabetes), asthma, a blood disorder, no spleen, complement component deficiency, a cochlear implant, or a spinal fluid leak?  Are you on long-term aspirin therapy?   No   Do you have cancer, leukemia, HIV/AIDS, or any other immune system problem?   No   Do you have a parent, brother, or sister with an immune system problem?   No   In the past 3 months, have you taken medications that affect  your immune system, such as prednisone, other steroids, or anticancer drugs; drugs for the treatment of rheumatoid arthritis, Crohn s disease, or psoriasis; or have you had radiation treatments?   No   Have you had a seizure, or a brain or other nervous system problem?   No   During the past year, have you received a transfusion of blood or blood    products, or been given immune (gamma) globulin or antiviral drug?   No   For women: Are you pregnant or is there a chance you could become       pregnant during the next month?   No   Have you received any vaccinations in the past 4 weeks?   No     Immunization questionnaire answers were all negative.        Per orders of Dr. Benedict, injection of TD given by Kelley Dow CMA. Patient instructed to remain in clinic for 15 minutes afterwards, and to report any adverse reaction to me immediately.       Screening performed by Kelley Dow CMA on 6/10/2021 at 9:56 AM.

## 2021-06-10 NOTE — RESULT ENCOUNTER NOTE
It was a pleasure seeing you for your physical examination.  I wanted to get back to you with your test results.  I have enclosed a copy for your review.      There are a couple of minor abnormalities but for the most part the labs are fine.    Your cbc or complete blood count has a slightly low white count.  These are the infection fighting cells.  I suspect this is either a lab variance or just a randomly low level.  In any case there is no concern and it does not mean you have any increased risk of infection but I would like to repeat it in the future.    Your sugar diabetes test is high and the 2nd diabetes test called the hemoglobin a1c is also elevated.  This indicates a significant risk of developing diabetes.  The best way to lower this is regular exercise, a healthy diet and keeping your weight down.  Please work on all these things and we can follow up on this as well.    Your blood salts, kidney tests, liver tests, and proteins are all fine.  The calcium this year is high, last year was low, no worries and again we can repeat this.    Your cholesterol is 220.  The hdl or good is fine at 56.  The ldl or bad is elevated at 143.  I do think taking everything into account that your your risk of cardiovascular disease may be a bit higher(your age, sex, the sugar and the calcium score).  I would be in favor of adding lipitor to lower this risk.  I can send that to your pharmacy.  Most people have no side effects, occasionally some joint pain, and if this is the case let me know.    As you can see for the most part things are fine.  I will send in the prescription and also I would suggest taking a baby aspirin daily.  I would ask that you come back and repeat the labs in 2 months.  Please remember to do this.  You do not need to fast or see me for this but please schedule it on your own using [facility].  If you can not do this just give us a call.    If you have any questions please call me.    Sai Benedict,  M.D.

## 2021-06-10 NOTE — PROGRESS NOTES
SUBJECTIVE:   CC: Brice Trevino is an 60 year old male who presents for preventative health visit.     The patient feels fine, has few issues    1. Rash palm of right hand, has had off and on for years, using triam and seeing derm soon but needs refill  2. Left foot discomfort, has lump, for years  3. Onycho left great toe nail.    He feels fine, works out reg.  No c/o.      Patient has been advised of split billing requirements and indicates understanding: Yes  Healthy Habits:     Getting at least 3 servings of Calcium per day:  NO    Bi-annual eye exam:  NO    Dental care twice a year:  Yes    Sleep apnea or symptoms of sleep apnea:  Daytime drowsiness    Diet:  Low salt and Carbohydrate counting    Frequency of exercise:  1 day/week    Duration of exercise:  Less than 15 minutes    Taking medications regularly:  Yes    Medication side effects:  None    PHQ-2 Total Score: 0    Additional concerns today:  Yes              Today's PHQ-2 Score:   PHQ-2 ( 1999 [employer]) 6/9/2021   Q1: Little interest or pleasure in doing things 0   Q2: Feeling down, depressed or hopeless 0   PHQ-2 Score 0   Q1: Little interest or pleasure in doing things Not at all   Q2: Feeling down, depressed or hopeless Not at all   PHQ-2 Score 0       Abuse: Current or Past(Physical, Sexual or Emotional)- No  Do you feel safe in your environment? Yes    Have you ever done Advance Care Planning? (For example, a Health Directive, POLST, or a discussion with a medical provider or your loved ones about your wishes): No, advance care planning information given to patient to review.  Patient plans to discuss their wishes with loved ones or provider.      Social History     Tobacco Use     Smoking status: Never Smoker     Smokeless tobacco: Never Used   Substance Use Topics     Alcohol use: No     If you drink alcohol do you typically have >3 drinks per day or >7 drinks per week? No               Past Medical History:      Past Medical History:    Diagnosis Date     Blood glucose elevated      Dermatitis      Elevated coronary artery calcium score 11/2018    16.8     Gastroesophageal reflux disease      Inguinal hernia     right very small     Prostate cancer (H)     robotic surgery done 1/19 by Dr. Sin, slight rise psa 10/20 had xrt and hormone therapy             Past Surgical History:      Past Surgical History:   Procedure Laterality Date     COLONOSCOPY  4/19/2013    Procedure: COLONOSCOPY;  Colonoscopy;  Surgeon: Yovany Alcaraz MD;  Location:  GI     DAVINCI PROSTATECTOMY, LYMPHADENECTOMY Bilateral 1/18/2019    Procedure: robotic assisted laparoscopic radical prostatectomy bilateral pelvic lymphandectomy;  Surgeon: Alan Sin MD;  Location: [facility] OR     RHINOPLASTY               Social History:     Social History     Socioeconomic History     Marital status: Single     Spouse name: Not on file     Number of children: 0     Years of education: Not on file     Highest education level: Not on file   Occupational History     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   Social Needs     Financial resource strain: Not on file     Food insecurity     Worry: Not on file     Inability: Not on file     Transportation needs     Medical: Not on file     Non-medical: Not on file   Tobacco Use     Smoking status: Never Smoker     Smokeless tobacco: Never Used   Substance and Sexual Activity     Alcohol use: No     Drug use: No     Sexual activity: Never   Lifestyle     Physical activity     Days per week: Not on file     Minutes per session: Not on file     Stress: Not on file   Relationships     Social connections     Talks on phone: Not on file     Gets together: Not on file     Attends Taoist service: Not on file     Active member of club or organization: Not on file     Attends meetings of clubs or organizations: Not on file     Relationship status: Not on file     Intimate partner violence     Fear of current or ex partner: Not on  "file     Emotionally abused: Not on file     Physically abused: Not on file     Forced sexual activity: Not on file   Other Topics Concern     Parent/sibling w/ CABG, MI or angioplasty before 65F 55M? No   Social History Narrative    Dairy/d 2 servings/d.     Caffeine 2 servings/d    Exercise 5 x week    Sunscreen used - Yes    Seatbelts used - Yes    Working smoke/CO detectors in the home - Yes    Guns stored in the home - No    Self Breast Exams - No    Self Testicular Exam - No    Eye Exam up to date - Yes    Dental Exam up to date - Yes    Pap Smear up to date - NA    Mammogram up to date - NA    PSA up to date - No    Dexa Scan up to date - No    Flex Sig / Colonoscopy up to date - Yes    Immunizations up to date - No    Abuse: Current or Past(Physical, Sexual or Emotional)- No    Do you feel safe in your environment - Yes                     Family History:   reviewed         Allergies:     Allergies   Allergen Reactions     No Known Drug Allergies              Medications:     Current Outpatient Medications   Medication Sig Dispense Refill     fluocinonide (LIDEX) 0.05 % external ointment Apply topically 2 times daily 60 g 1     terbinafine (LAMISIL) 1 % external cream DENILSON AA BID FOR FUNGAL INFECTION  1     triamcinolone (KENALOG) 0.1 % external cream Apply topically 2 times daily 15 g 0     sildenafil (REVATIO) 20 MG tablet Take 1 tablet (20 mg) by mouth daily as needed (sexual activity) Take 1-3 tabs, 30-60 mins prior to sexual activity. Do not take with nitrates. (Patient not taking: Reported on 6/10/2021) 30 tablet 3     triamcinolone (KENALOG) 0.1 % external cream Apply topically 2 times daily                 Review of Systems:   The 10 point Review of Systems is negative other than noted in the HPI           Physical Exam:   Blood pressure 102/70, pulse 84, temperature 97  F (36.1  C), temperature source Oral, height 1.727 m (5' 8\"), weight 83.9 kg (185 lb), SpO2 97 %.    Exam:  Constitutional: healthy " appearing, alert and in no distress  Heent: Normocephalic. Head without obvious masses or lesions. PERRLDC, EOMI. Mouth exam within normal limits: tongue, mucous membranes, posterior pharynx all normal, no lesions or abnormalities seen.  Tm's and canals within normal limits bilaterally. Neck supple, no nuchal rigidity or masses. No supraclavicular, or cervical adenopathy. Thyroid symmetric, no masses.  Cardiovascular: Regular rate and rhythm, no murmer, rub or gallops.  JVP not elevated, no edema.  Carotids within normal limits bilaterally, no bruits.  Respiratory: Normal respiratory effort.  Lungs clear, normal flow, no wheezing or crackles.  Breasts: Normal bilaterally.  No masses or lesions.  Nipples within normal limites.  No axillary lesions or nodes.  Gastrointestinal: Normal active bowel sounds.   Soft, not tender, no masses, guarding or rebound.  No hepatosplenomegaly.   Genitourinary: Rectal not done  Musculoskeletal: extremities normal, no gross deformities noted.  Skin: no suspicious lesions or rashes x palmar surface right hand exzematous type rash  Neurologic: Mental status within normal limits.  Speech fluent.  No gross motor abnormalities and gait intact.  Psychiatric: mentation appears normal and affect normal.         Data:   Labs sent        Assessment:   1. Normal complete physical exam  2. Cap, has reg urol follow up, did have rise in psa and then xrt and hormones 2020  3. Gerd, no issues  4. Elevated calcium score, discussed with patient need for exercise, diet, check labs, consider statin and asa  5. Elevated sugar, exercise, diet  6. Rash, suspect eczema, try cream, follow up derm  7. Onycho, follow up derm  8. Foot pain, to pod  9. hcm         Plan:   Td  shingrix at pharm  Up to date colon  triam cream  Follow up urol, derm  Exercise, diet  Consider statin and asa  Letter with labs      Sia Benedict M.D.

## 2021-06-29 ENCOUNTER — OFFICE VISIT (OUTPATIENT)
Dept: DERMATOLOGY | Facility: CLINIC | Age: 61
End: 2021-06-29
Payer: COMMERCIAL

## 2021-06-29 DIAGNOSIS — L82.0 INFLAMED SEBORRHEIC KERATOSIS: ICD-10-CM

## 2021-06-29 DIAGNOSIS — B35.1 ONYCHOMYCOSIS: ICD-10-CM

## 2021-06-29 DIAGNOSIS — D48.5 NEOPLASM OF UNCERTAIN BEHAVIOR OF SKIN: Primary | ICD-10-CM

## 2021-06-29 DIAGNOSIS — L30.9 DERMATITIS: ICD-10-CM

## 2021-06-29 DIAGNOSIS — L82.1 SK (SEBORRHEIC KERATOSIS): ICD-10-CM

## 2021-06-29 PROCEDURE — 11102 TANGNTL BX SKIN SINGLE LES: CPT | Mod: XS | Performed by: DERMATOLOGY

## 2021-06-29 PROCEDURE — 88305 TISSUE EXAM BY PATHOLOGIST: CPT | Performed by: DERMATOLOGY

## 2021-06-29 PROCEDURE — 11103 TANGNTL BX SKIN EA SEP/ADDL: CPT | Mod: XS | Performed by: DERMATOLOGY

## 2021-06-29 PROCEDURE — 88341 IMHCHEM/IMCYTCHM EA ADD ANTB: CPT | Performed by: DERMATOLOGY

## 2021-06-29 PROCEDURE — 88342 IMHCHEM/IMCYTCHM 1ST ANTB: CPT | Performed by: DERMATOLOGY

## 2021-06-29 PROCEDURE — 17110 DESTRUCTION B9 LES UP TO 14: CPT | Performed by: DERMATOLOGY

## 2021-06-29 PROCEDURE — 99213 OFFICE O/P EST LOW 20 MIN: CPT | Mod: 25 | Performed by: DERMATOLOGY

## 2021-06-29 RX ORDER — FLUOCINONIDE 0.5 MG/G
CREAM TOPICAL 2 TIMES DAILY
Qty: 60 G | Refills: 3 | Status: SHIPPED | OUTPATIENT
Start: 2021-06-29

## 2021-06-29 RX ORDER — FLUOCINONIDE 0.5 MG/G
OINTMENT TOPICAL 2 TIMES DAILY
Qty: 60 G | Refills: 1 | Status: CANCELLED | OUTPATIENT
Start: 2021-06-29

## 2021-06-29 ASSESSMENT — PAIN SCALES - GENERAL: PAINLEVEL: NO PAIN (0)

## 2021-06-29 NOTE — LETTER
6/29/2021       RE: Brice Trevino  [address]  [place] [place] [address]     Dear Colleague,    Thank you for referring your patient, Brice Trevino, to the [facility] [place] at [facility]. Please see a copy of my visit note below.    [facility] Dermatology Note  Encounter Date: Jun 29, 2021  Office Visit     Dermatology Problem List:  #. NUBs  #. ISKs  #. Onychomycosis and history of tinea pedis  #. History of dyshidrotic eczema    ____________________________________________    Assessment & Plan:     #. NUB L shoulder anterior and posterior, and R back  - Shave biopsy performed today (see procedure note(s) below).     #. ISK  - Cryotherapy performed today (see procedure note(s) below).    #. Oncyhomycosis  - extensive discussion regarding risk benefits and alternatives of oral treatment  - terbinafine 250mg daily for 3mo  - safety labs in chart already (baseline)    Procedures Performed:   - Shave biopsy procedure note, location(s): L shoulder anterior and posterior, and R back. After discussion of benefits and risks including but not limited to bleeding, infection, scar, incomplete removal, recurrence, and non-diagnostic biopsy, written consent and photographs were obtained. The area was cleaned with isopropyl alcohol. 0.5mL of 1% lidocaine with epinephrine was injected to obtain adequate anesthesia of lesion(s). Shave biopsy at site(s) performed. Hemostasis was achieved with aluminium chloride. Petrolatum ointment and a sterile dressing were applied. The patient tolerated the procedure and no complications were noted. The patient was provided with verbal and written post care instructions.   - Cryotherapy procedure note, location(s): Right back, right shoulder, left cheek x2. After verbal consent and discussion of risks and benefits including, but not limited to, dyspigmentation/scar, blister, and pain,  4 lesion(s) was(were) treated with 1-2 mm freeze border for 1-2 cycles with liquid nitrogen. Post cryotherapy instructions were provided.    Follow-up: 1 year(s) in-person, or earlier for new or changing lesions    Staff:     Ryan Bañuelos MD  Dermatology/Dermatopathology Staff Physician  , Department of Dermatology    ____________________________________________    CC: Skin Check (pt states he is here for a for a full body skin check, spot on right shoulder itching )    HPI:  Mr. Brice Trevino is a(n) 60 year old male who presents today as a return patient for full body skin examination.    He denies any lesions of concern today, other than an itchy spot on his right shoulder.  He was unaware of the other lesions to be biopsied today.    Additionally, he would like treatment for his onychomycosis.  We discussed the risk benefits and alternatives.    Patient is otherwise feeling well, without additional skin concerns.     Labs Reviewed:  N/A    Physical Exam:  Vitals: There were no vitals taken for this visit.  SKIN: Full skin, which includes the head/face, both arms, chest, back, abdomen,both legs, genitalia and/or groin buttocks, digits and/or nails, was examined.  -Left anterior shoulder: Irregular brown-black macule about 6 mm in diameter.  -Left posterior shoulder: Shiny pink papule which may be centered around a follicle.  -Right back red-purple domed smooth papule consistent cherry angioma  - Multiple regular brown pigmented macules and papules are identified on the trunk and extremities.   - There are waxy stuck on tan to brown papules on the trunk and extremities.   - No other lesions of concern on areas examined.     Medications:  Current Outpatient Medications   Medication     aspirin (ASA) 81 MG EC tablet     atorvastatin (LIPITOR) 20 MG tablet     fluocinonide (LIDEX) 0.05 % external cream     fluocinonide (LIDEX) 0.05 % external ointment     terbinafine (LAMISIL) 1 % external  cream     terbinafine (LAMISIL) 250 MG tablet     triamcinolone (KENALOG) 0.1 % external cream     triamcinolone (KENALOG) 0.1 % external cream     sildenafil (REVATIO) 20 MG tablet     No current facility-administered medications for this visit.      Past Medical History:   Patient Active Problem List   Diagnosis     Obesity     Migraine     Prostate cancer (H)     Gastroesophageal reflux disease     Blood glucose elevated     Elevated coronary artery calcium score     Past Medical History:   Diagnosis Date     Blood glucose elevated      Dermatitis      Elevated coronary artery calcium score 11/2018    16.8     Gastroesophageal reflux disease      Inguinal hernia     right very small     Prostate cancer (H)     robotic surgery done 1/19 by Dr. Sin, slight rise psa 10/20 had xrt and hormone therapy       CC Referred Self, MD  No address on file on close of this encounter.

## 2021-07-19 LAB — COPATH REPORT: NORMAL

## 2021-07-23 ENCOUNTER — TELEPHONE (OUTPATIENT)
Dept: DERMATOLOGY | Facility: CLINIC | Age: 61
End: 2021-07-23

## 2021-07-23 DIAGNOSIS — D23.9 DYSPLASTIC NEVUS: Primary | ICD-10-CM

## 2021-07-23 RX ORDER — TERBINAFINE HYDROCHLORIDE 250 MG/1
250 TABLET ORAL DAILY
Qty: 30 TABLET | Refills: 2 | Status: SHIPPED | OUTPATIENT
Start: 2021-07-23 | End: 2022-04-28

## 2021-07-23 NOTE — TELEPHONE ENCOUNTER
"\"Hi Adan,     The biopsy showed an atypical mole but not melanoma on the left anterior shoulder. I apologize for it taking a minute to get this result in - the containers got switched so we verified all the site and processing information, and reviewed the photographs and diagnosis at consensus conference to make sure everything was in order.      We recommend a narrow re-excision at this site to ensure complete removal. The more atypical a mole is, the more we want to be sure every cell is gone. This would be a small surgery under local anesthesia.   My nursing staff will be in touch shortly to schedule a time that is convenient for you.      Please feel free to contact us with questions. I hope this message finds you well.     Dr. Bañuelos\"    Terra Pichardo CMA   7/23/2021 10:57 AM CDT Back to Top      Seen by patient Adan Trevino on 7/23/2021  9:28 AM CDT. Referral and recall placed.    Ryan Bañuelos MD   7/23/2021  8:58 AM CDT       Biopsy showed dysplastic nevus needing re-excision - please schedule with me at next available excision slot, and ensure 6 month follow up skin check is scheduled. Thanks!     Terra Pichardo CMA on 7/23/2021 at 10:59 AM  "

## 2021-07-26 ENCOUNTER — TELEPHONE (OUTPATIENT)
Dept: DERMATOLOGY | Facility: CLINIC | Age: 61
End: 2021-07-26

## 2021-07-26 NOTE — TELEPHONE ENCOUNTER
LVM w/pt to return phone call to schedule procedure with Derm Surg, next available.      Ean Trejo, Procedure

## 2021-07-29 NOTE — PROGRESS NOTES
[facility] Dermatology Note  Encounter Date: Jun 29, 2021  Office Visit     Dermatology Problem List:  #. NUBs  #. ISKs  #. Onychomycosis and history of tinea pedis  #. History of dyshidrotic eczema    ____________________________________________    Assessment & Plan:     #. NUB L shoulder anterior and posterior, and R back  - Shave biopsy performed today (see procedure note(s) below).     #. ISK  - Cryotherapy performed today (see procedure note(s) below).    #. Oncyhomycosis  - extensive discussion regarding risk benefits and alternatives of oral treatment  - terbinafine 250mg daily for 3mo  - safety labs in chart already (baseline)    Procedures Performed:   - Shave biopsy procedure note, location(s): L shoulder anterior and posterior, and R back. After discussion of benefits and risks including but not limited to bleeding, infection, scar, incomplete removal, recurrence, and non-diagnostic biopsy, written consent and photographs were obtained. The area was cleaned with isopropyl alcohol. 0.5mL of 1% lidocaine with epinephrine was injected to obtain adequate anesthesia of lesion(s). Shave biopsy at site(s) performed. Hemostasis was achieved with aluminium chloride. Petrolatum ointment and a sterile dressing were applied. The patient tolerated the procedure and no complications were noted. The patient was provided with verbal and written post care instructions.   - Cryotherapy procedure note, location(s): Right back, right shoulder, left cheek x2. After verbal consent and discussion of risks and benefits including, but not limited to, dyspigmentation/scar, blister, and pain, 4 lesion(s) was(were) treated with 1-2 mm freeze border for 1-2 cycles with liquid nitrogen. Post cryotherapy instructions were provided.    Follow-up: 1 year(s) in-person, or earlier for new or changing lesions    Staff:     Ryan Bañuelos MD  Dermatology/Dermatopathology Staff Physician  ,  Department of Dermatology    ____________________________________________    CC: Skin Check (pt states he is here for a for a full body skin check, spot on right shoulder itching )    HPI:  Mr. Brice Trevino is a(n) 60 year old male who presents today as a return patient for full body skin examination.    He denies any lesions of concern today, other than an itchy spot on his right shoulder.  He was unaware of the other lesions to be biopsied today.    Additionally, he would like treatment for his onychomycosis.  We discussed the risk benefits and alternatives.    Patient is otherwise feeling well, without additional skin concerns.     Labs Reviewed:  N/A    Physical Exam:  Vitals: There were no vitals taken for this visit.  SKIN: Full skin, which includes the head/face, both arms, chest, back, abdomen,both legs, genitalia and/or groin buttocks, digits and/or nails, was examined.  -Left anterior shoulder: Irregular brown-black macule about 6 mm in diameter.  -Left posterior shoulder: Shiny pink papule which may be centered around a follicle.  -Right back red-purple domed smooth papule consistent cherry angioma  - Multiple regular brown pigmented macules and papules are identified on the trunk and extremities.   - There are waxy stuck on tan to brown papules on the trunk and extremities.   - No other lesions of concern on areas examined.     Medications:  Current Outpatient Medications   Medication     aspirin (ASA) 81 MG EC tablet     atorvastatin (LIPITOR) 20 MG tablet     fluocinonide (LIDEX) 0.05 % external cream     fluocinonide (LIDEX) 0.05 % external ointment     terbinafine (LAMISIL) 1 % external cream     terbinafine (LAMISIL) 250 MG tablet     triamcinolone (KENALOG) 0.1 % external cream     triamcinolone (KENALOG) 0.1 % external cream     sildenafil (REVATIO) 20 MG tablet     No current facility-administered medications for this visit.      Past Medical History:   Patient Active Problem List   Diagnosis      Obesity     Migraine     Prostate cancer (H)     Gastroesophageal reflux disease     Blood glucose elevated     Elevated coronary artery calcium score     Past Medical History:   Diagnosis Date     Blood glucose elevated      Dermatitis      Elevated coronary artery calcium score 11/2018    16.8     Gastroesophageal reflux disease      Inguinal hernia     right very small     Prostate cancer (H)     robotic surgery done 1/19 by Dr. Sin, slight rise psa 10/20 had xrt and hormone therapy       CC Referred Self, MD  No address on file on close of this encounter.

## 2021-08-10 ENCOUNTER — LAB (OUTPATIENT)
Dept: LAB | Facility: CLINIC | Age: 61
End: 2021-08-10
Payer: COMMERCIAL

## 2021-08-10 DIAGNOSIS — R79.89 ABNORMAL CBC: ICD-10-CM

## 2021-08-10 DIAGNOSIS — R93.1 ELEVATED CORONARY ARTERY CALCIUM SCORE: ICD-10-CM

## 2021-08-10 DIAGNOSIS — E83.52 HIGH CALCIUM LEVELS: ICD-10-CM

## 2021-08-10 LAB
BASOPHILS # BLD AUTO: 0 10E3/UL (ref 0–0.2)
BASOPHILS NFR BLD AUTO: 1 %
CA-I BLD-MCNC: 5.1 MG/DL (ref 4.4–5.2)
CHOLEST SERPL-MCNC: 145 MG/DL
EOSINOPHIL # BLD AUTO: 0.6 10E3/UL (ref 0–0.7)
EOSINOPHIL NFR BLD AUTO: 15 %
ERYTHROCYTE [DISTWIDTH] IN BLOOD BY AUTOMATED COUNT: 12.4 % (ref 10–15)
FASTING STATUS PATIENT QL REPORTED: YES
HCT VFR BLD AUTO: 40.5 % (ref 40–53)
HDLC SERPL-MCNC: 58 MG/DL
HGB BLD-MCNC: 13.8 G/DL (ref 13.3–17.7)
IMM GRANULOCYTES # BLD: 0 10E3/UL
IMM GRANULOCYTES NFR BLD: 1 %
LDLC SERPL CALC-MCNC: 73 MG/DL
LYMPHOCYTES # BLD AUTO: 0.9 10E3/UL (ref 0.8–5.3)
LYMPHOCYTES NFR BLD AUTO: 21 %
MCH RBC QN AUTO: 32.4 PG (ref 26.5–33)
MCHC RBC AUTO-ENTMCNC: 34.1 G/DL (ref 31.5–36.5)
MCV RBC AUTO: 95 FL (ref 78–100)
MONOCYTES # BLD AUTO: 0.6 10E3/UL (ref 0–1.3)
MONOCYTES NFR BLD AUTO: 13 %
NEUTROPHILS # BLD AUTO: 2 10E3/UL (ref 1.6–8.3)
NEUTROPHILS NFR BLD AUTO: 49 %
NONHDLC SERPL-MCNC: 87 MG/DL
NRBC # BLD AUTO: 0 10E3/UL
NRBC BLD AUTO-RTO: 0 /100
PLATELET # BLD AUTO: 179 10E3/UL (ref 150–450)
RBC # BLD AUTO: 4.26 10E6/UL (ref 4.4–5.9)
TRIGL SERPL-MCNC: 70 MG/DL
WBC # BLD AUTO: 4.1 10E3/UL (ref 4–11)

## 2021-08-10 PROCEDURE — 85025 COMPLETE CBC W/AUTO DIFF WBC: CPT

## 2021-08-10 PROCEDURE — 82330 ASSAY OF CALCIUM: CPT

## 2021-08-10 PROCEDURE — 36415 COLL VENOUS BLD VENIPUNCTURE: CPT

## 2021-08-10 PROCEDURE — 80061 LIPID PANEL: CPT

## 2021-08-10 NOTE — RESULT ENCOUNTER NOTE
Adan,    Your ionized calcium level was in normal range.  Your cholesterol profile looks much better on the Lipitor.  Please continue this dose.  Your red blood cell count was stable.    Kayley Castro PA-C  Covering for Dr. Benedict

## 2021-09-19 ENCOUNTER — HEALTH MAINTENANCE LETTER (OUTPATIENT)
Age: 61
End: 2021-09-19

## 2021-10-04 ENCOUNTER — TELEPHONE (OUTPATIENT)
Dept: DERMATOLOGY | Facility: CLINIC | Age: 61
End: 2021-10-04

## 2021-10-04 NOTE — TELEPHONE ENCOUNTER
Called and left message for patient to reschedule upcoming appointment on 10/7.    Ean Trejo, Procedure

## 2021-10-22 ENCOUNTER — LAB (OUTPATIENT)
Dept: LAB | Facility: CLINIC | Age: 61
End: 2021-10-22
Payer: COMMERCIAL

## 2021-10-22 DIAGNOSIS — C61 PROSTATE CANCER (H): ICD-10-CM

## 2021-10-22 DIAGNOSIS — E29.1 HYPOGONADISM MALE: ICD-10-CM

## 2021-10-22 LAB — PSA SERPL-MCNC: <0.04 UG/L (ref 0–4)

## 2021-10-22 PROCEDURE — 84270 ASSAY OF SEX HORMONE GLOBUL: CPT

## 2021-10-22 PROCEDURE — 84153 ASSAY OF PSA TOTAL: CPT

## 2021-10-22 PROCEDURE — 36415 COLL VENOUS BLD VENIPUNCTURE: CPT

## 2021-10-22 PROCEDURE — 84403 ASSAY OF TOTAL TESTOSTERONE: CPT

## 2021-10-23 LAB — SHBG SERPL-SCNC: 27 NMOL/L (ref 11–80)

## 2021-10-27 LAB
SHBG SERPL-SCNC: 27 NMOL/L (ref 11–80)
TESTOST FREE SERPL-MCNC: 5.87 NG/DL
TESTOST SERPL-MCNC: 269 NG/DL (ref 240–950)

## 2021-10-28 ENCOUNTER — OFFICE VISIT (OUTPATIENT)
Dept: UROLOGY | Facility: CLINIC | Age: 61
End: 2021-10-28
Payer: COMMERCIAL

## 2021-10-28 VITALS
SYSTOLIC BLOOD PRESSURE: 102 MMHG | HEART RATE: 89 BPM | HEIGHT: 69 IN | OXYGEN SATURATION: 96 % | BODY MASS INDEX: 27.7 KG/M2 | DIASTOLIC BLOOD PRESSURE: 60 MMHG | WEIGHT: 187 LBS

## 2021-10-28 DIAGNOSIS — C61 PROSTATE CANCER (H): Primary | ICD-10-CM

## 2021-10-28 DIAGNOSIS — N52.31 ERECTILE DYSFUNCTION AFTER RADICAL PROSTATECTOMY: ICD-10-CM

## 2021-10-28 DIAGNOSIS — Z90.79 S/P PROSTATECTOMY: ICD-10-CM

## 2021-10-28 PROCEDURE — 99213 OFFICE O/P EST LOW 20 MIN: CPT | Performed by: UROLOGY

## 2021-10-28 RX ORDER — SILDENAFIL CITRATE 20 MG/1
20 TABLET ORAL DAILY PRN
Qty: 30 TABLET | Refills: 3 | Status: SHIPPED | OUTPATIENT
Start: 2021-10-28 | End: 2022-10-20

## 2021-10-28 ASSESSMENT — PAIN SCALES - GENERAL: PAINLEVEL: NO PAIN (0)

## 2021-10-28 ASSESSMENT — MIFFLIN-ST. JEOR: SCORE: 1635.67

## 2021-10-28 NOTE — PROGRESS NOTES
"  CHIEF COMPLAINT   It was my pleasure to see Brice Trevino who is a 61 year old male for follow-up of Prostate Cancer.      HPI  Brice Trevino is a very pleasant 61 year old male who presents with a history of Prostate Cancer.  RALP completed 1/18/2019. [place] 5+4, pT3b with negative lymph nodes. Negative margins. SVI and EPE present. Intraductal carcinoma noted. Good return of continence, but still wears pad for safety.     PSA had elevated to 0.08. Eligard given 11/3/2020. Completed aslvage pelvic radiotherapy 1/2021. PSA undetectable today. No longer on ADT.     PSA   10/22/2021 - <0.04 (Testosterone 269)  4/30/2021 - <0.04  1/292021 - <0.04  10/23/2020 - 0.08  6/9/2020 - <0.04  3/13/2020 - <0.04  12/13/2019 - <0.04  9/9/2019 - <0.04  6/10/2019 - <0.04  3/11/2019 - <0.04     RALP 1/18/2019  Tumor        Histologic Type:   Adenocarcinoma (acinar, not otherwise specified)        Mariano Pattern:             Primary Pattern:   Grade 5 (60%)             Secondary Pattern:   Grade 4 (40%).             Total Mariano Score:   9 (Grade group: 5)   Margins:   Margins uninvolved by invasive carcinoma   Pathologic Staging (pTNM)        Primary Tumor (pT):    pT3b:  Seminal vesicle invasion        Regional Lymph Nodes (pN):   pN0: No regional lymph node metastasis             Number of Lymph Nodes Examined:   8             Number of Lymph Nodes Involved:     0        Distant Metastasis (pM):    pM N/A     Additional Pathologic Findings:  Intraductal carcinoma present.     PHYSICAL EXAM  Patient is a 61 year old  male   Vitals: Blood pressure 102/60, pulse 89, height 1.74 m (5' 8.5\"), weight 84.8 kg (187 lb), SpO2 96 %.  General Appearance Adult: Body mass index is 28.02 kg/m .  Alert, no acute distress, oriented  Lungs: no respiratory distress, or pursed lip breathing  Abdomen: soft, nontender, no organomegaly or masses  Back: no CVAT  Neuro: Alert, oriented, speech and mentation normal  Psych: affect and mood " normal    Outside and Past Medical records:  Review of the result(s) of each unique test - PSA, testosterone    ASSESSMENT and PLAN  61 year old male with Mariano 5+4=9 prostate cancer, pT3bN0, with +SVI, +EPE, but negative margins. RALP 1/18/2019.  Noted to have PSA rise at last visit to 0.08, and given high-risk features at prostatectomy, has now completed salvage radiotherapy with short-course hormones. Will not plan additional ADT at this point. We discussed the natural history of prostate cancer in this setting, and he will plan to follow-up in 6 months with PSA. Sildenafil renewed today.     - Follow-up 6 months with PSA    20 minutes spent on the date of the encounter doing chart review, history and exam, documentation and further activities as noted above.    Alan Sin MD  Urology  [facility]

## 2021-11-16 ENCOUNTER — OFFICE VISIT (OUTPATIENT)
Dept: DERMATOLOGY | Facility: CLINIC | Age: 61
End: 2021-11-16
Attending: DERMATOLOGY
Payer: COMMERCIAL

## 2021-11-16 VITALS — SYSTOLIC BLOOD PRESSURE: 103 MMHG | DIASTOLIC BLOOD PRESSURE: 60 MMHG | HEART RATE: 83 BPM

## 2021-11-16 DIAGNOSIS — D23.9 DYSPLASTIC NEVUS: Primary | ICD-10-CM

## 2021-11-16 PROCEDURE — 11402 EXC TR-EXT B9+MARG 1.1-2 CM: CPT | Mod: GC | Performed by: DERMATOLOGY

## 2021-11-16 PROCEDURE — 88305 TISSUE EXAM BY PATHOLOGIST: CPT | Mod: 26 | Performed by: DERMATOLOGY

## 2021-11-16 PROCEDURE — 88305 TISSUE EXAM BY PATHOLOGIST: CPT | Mod: TC | Performed by: DERMATOLOGY

## 2021-11-16 PROCEDURE — 12032 INTMD RPR S/A/T/EXT 2.6-7.5: CPT | Mod: GC | Performed by: DERMATOLOGY

## 2021-11-16 ASSESSMENT — PAIN SCALES - GENERAL: PAINLEVEL: NO PAIN (0)

## 2021-11-16 NOTE — PATIENT INSTRUCTIONS
Wound care    I will experience scar, bleeding, swelling, pain, crusting and redness. I may experience incomplete removal or recurrence. Risks are bleeding, bruising, swelling, infection, nerve damage, & a large wound. A second procedure may be recommended to obtain the best cosmetic or functional result.       A three month office visit with your Surgeon is recommended for scar evaluation. Please reach out sooner if you have concerns about you surgical site/wound.    Caring for your skin after surgery    After your surgery, a pressure bandage will be placed over the area that has stitches. This is important to prevent bleeding. Please follow these instructions over the next 1 to 2 weeks. Following this regimen will help to prevent complications as your wound heals.     For the first 48 hours after your surgery:      Leave the pressure dressing on and keep it dry. If it should come loose, you may re-tape it, but do not take it off.    Relax and take it easy. Do not do any vigorous exercise or heavy lifting. This could cause the wound to bleed.    Post-Operative pain is usually mild. If you are able to take tylenol, You may take plain or extra-strength Tylenol (acetaminophen) As directed on the bottle (do not take more than 4,000mg in one day). If you are able to take ibuprofen, you can alternate the tylenol and ibuprofen.     Avoid alcohol as this may increase your tendency to bleed.     You may put an ice pack around the bandaged area for 20 minutes at a time as needed. This may help reduce swelling, bruising, and pain. Make sure the ice pack is waterproof so that the pressure bandage doesn t get wet.    If the wound is on the face try to sleep with your head elevated. Either in a recliner or propped up in bed, this will decrease swelling around the eyes.     You may see a small amount of drainage or blood on your pressure bandage. This is normal. However:  o If drainage or bleeding continues or saturates the  bandage, you will need to apply firm pressure over the bandage with a piece of gauze for 15 minutes.  o If bleeding continues after applying pressure for 15 minutes, apply an ice pack to the bandaged area for 15 minutes.  o If bleeding still continues, call our office or go to the nearest emergency room.    Remove pressure dressing 48 hours after surgery:      Carefully remove the pressure bandage. If it seems sticky or too difficult to get off, you may need to soak it off in the shower.    After the pressure dressing is removed, you may shower and get the wound wet. However, Do Not let the forceful stream of the shower hit the wound directly.    Follow these wound care and dressing change instructions:  o  In the shower was the surgical site last with its own separate was cloth.  o You may allow water to run over the site. Take a clean wash cloth wet with soapy warm water and gently pat the suture site to help remove any crust or drainage.   o Do Not rub or scrub the site    o After site is clean pat dry and apply a thin layer of Vaseline ointment  over the suture site with a cotton swab or clean finger.   o Cover the suture site with Telfa (non-stick) dressing. You may tape a piece of gauze over the Telfa for extra protection if you wish.  o Continue wound care at least once a day, twice if you are active or around a contaminated environment.  o Continue daily wound care until your surgical site is completely healed. To determine this, around 2 weeks post procedure you can take a cotton swab with a small amount of Hydrogen peroxide and roll it on the suture site. If the site bubbles and turns white your wound is still healing. Please continue wound care until the area no longer bubbles up from the hydrogen peroxide.   o Dissolving stitches, if you have been told your stitches are dissolving they should dissolve in one to one and a half week. If the stiches do not dissolve by one and a half week you can use a Qtip  with hydrogen peroxide on it and roll it along the suture line to help the stitches dissolve and come out. Please give us a call if stitches are still in after two weeks. If you do not keep your wound moist at all times while it heals the dissolving sutures will dry out and not dissolve.         Follow up will be a 3 month scar evaluation either in person or via a telephone visit with you sending in a photo via [employer]. Unless you have been told to follow up sooner or if you have concerns and would like to be see sooner. Please call or send us in a [employer] message if possible and attach a photo.        What to expect:      The first couple of days your wound may be tender and may bleed slightly when doing wound care.    There may be swelling and bruising around the wound, especially if it is near the eyes. For your comfort, you may apply ice or cold compresses to the bruises after your have removed the pressure bandage.    The area around your wound may be numb for several weeks or even months.    You may experience periodic sharp pain or mild itching around the wound as it heals.     The suture line will look dark for a while but will lighten over time.       When to call us:      You have bleeding that will not stop after applying pressure and ice.    You have pain that is not controlled with Tylenol (acetaminophen.)    You have signs or symptoms of an infection such as:  o Fever over 100 degrees Fahrenheit  o Redness, warmth or foul-smelling drainage from the wound  o If you have any questions, or are not sure how to take care of the wound.    Phone numbers:    During business hours (M-F 8:00-4:30 p.m.)  [facility]-  [phone] Option 1 appt. desk  [phone]  Option 3 nurse triage line  ---------------------------------------------------------  Evenings/Weekends/Holidays  Hospital - [phone]   TTY for hearing nbifqzxq-[phone]  *Ask  to page dermatologist  on-call  Emergency Tktz-[phone]  TTY for hearing impaired- [phone]

## 2021-11-16 NOTE — NURSING NOTE
Chief Complaint   Patient presents with     Derm Problem     Patient is here today for excision left anterior shoulder.      Elly TESFAYE CMA

## 2021-11-16 NOTE — PROGRESS NOTES
[facility] Dermatologic Surgery Excision Note    Case #: 1  Date of Service:  Nov 16, 2021  Surgery: Wide local excision  Staff Surgeon: Obed Arcos MD  Fellow Surgeon: Juan Howard MD  Resident Surgeon: None  Nurse: Elly Luque CMA    Tumor Type: dysplastic nevus with moderate to severe atypia  Location: left anterior shoulder  Derm-Path Accession #: [id number]    Skin Lesion Size:  1.0 cm x 1.0 cm  Margin: 5 mm  Excision size: 2.0 cm x 2.0 cm  Level of Defect: Fat  Repair Type: Intermediate linear  Repair Size: 6.0 cm  Suture Material: 4-0 Monocryl; 5-0 fast absorbing gut    INDICATIONS:  The patient was scheduled for wide local excision of the skin lesion documented above. We discussed the principles of treatment and most likely complications including scarring, bleeding, infection, incomplete excision, wound dehiscence, pain, nerve damage, and recurrence. Informed consent was obtained and the patient underwent the procedure as follows:    PROCEDURE:  With the patient in a supine position, the lesion was outlined with the margin documented above.  The lesion and the necessary margin (excised diameter) was measured and documented as above.  An ellipse was designed around the lesion to conform to relaxed skin tension lines in an effort to minimize scarring and deformity.  The lesion and surrounding skin were prepped with chlorhexidine, draped, and anesthetized with lidocaine with epinephrine. Using a #15 blade, the skin was excised along premarked lines.  The level of the defect is documented as above.  Wound margins were undermined to limit functional deformity/impairment of adjacent structures. Bleeding vessels were controlled with electrocoagulation.  The dermis and subcutaneous tissue were closed with buried vertical mattress sutures using 4-0 Monocryl.  Epidermal approximation was meticulously refined with 5-0 fast absorbing gut simple running sutures, resulting in a linear closure with little  to no wound tension.  Blood loss was estimated to be less than 5 mL.  The area was coated with petrolatum and covered with a non-adherent dressing followed by gauze and tape. Postoperative instructions were reviewed per protocol.  The patient left alert and fully oriented.    Follow-up for suture removal: Not applicable as only dissolving sutures used    Obed Arcos MD was immediately available for the entire surgery and was physically present for the key portions of the procedure.    Juan Howard MD  Micrographic Surgery and Dermatologic Oncology (MSDO) Fellow    Scribe Disclosure:  I, Arianne Arevalo, am serving as a scribe to document services personally performed by Obed Arcos MD based on data collection and the provider's statements to me.

## 2021-11-16 NOTE — LETTER
11/16/2021       RE: Brice Trevino  [address]  [facility] [id number]     Dear Colleague,    Thank you for referring your patient, Brice Trevino, to the [facility] DERMATOLOGIC SURGERY CLINIC [place] at [facility]. Please see a copy of my visit note below.    [facility] Dermatologic Surgery Excision Note    Case #: 1  Date of Service:  Nov 16, 2021  Surgery: Wide local excision  Staff Surgeon: Obed Arcos MD  Fellow Surgeon: Juan Howard MD  Resident Surgeon: None  Nurse: Elly Luque CMA    Tumor Type: dysplastic nevus with moderate to severe atypia  Location: left anterior shoulder  Derm-Path Accession #: [id number]    Skin Lesion Size:  1.0 cm x 1.0 cm  Margin: 5 mm  Excision size: 2.0 cm x 2.0 cm  Level of Defect: Fat  Repair Type: Intermediate linear  Repair Size: 6.0 cm  Suture Material: 4-0 Monocryl; 5-0 fast absorbing gut    INDICATIONS:  The patient was scheduled for wide local excision of the skin lesion documented above. We discussed the principles of treatment and most likely complications including scarring, bleeding, infection, incomplete excision, wound dehiscence, pain, nerve damage, and recurrence. Informed consent was obtained and the patient underwent the procedure as follows:    PROCEDURE:  With the patient in a supine position, the lesion was outlined with the margin documented above.  The lesion and the necessary margin (excised diameter) was measured and documented as above.  An ellipse was designed around the lesion to conform to relaxed skin tension lines in an effort to minimize scarring and deformity.  The lesion and surrounding skin were prepped with chlorhexidine, draped, and anesthetized with lidocaine with epinephrine. Using a #15 blade, the skin was excised along premarked lines.  The level of the defect is documented as above.  Wound margins were undermined to limit functional  deformity/impairment of adjacent structures. Bleeding vessels were controlled with electrocoagulation.  The dermis and subcutaneous tissue were closed with buried vertical mattress sutures using 4-0 Monocryl.  Epidermal approximation was meticulously refined with 5-0 fast absorbing gut simple running sutures, resulting in a linear closure with little to no wound tension.  Blood loss was estimated to be less than 5 mL.  The area was coated with petrolatum and covered with a non-adherent dressing followed by gauze and tape. Postoperative instructions were reviewed per protocol.  The patient left alert and fully oriented.    Follow-up for suture removal: Not applicable as only dissolving sutures used    Obed Arcos MD was immediately available for the entire surgery and was physically present for the key portions of the procedure.    Juan Howard MD  Micrographic Surgery and Dermatologic Oncology (MSDO) Fellow    Scribe Disclosure:  I, Arianne Arevalo, am serving as a scribe to document services personally performed by Obed Arcos MD based on data collection and the provider's statements to me.     Attestation signed by Obed Arcos MD at 11/17/2021  4:20 PM:    Attending attestation:  I was present for key elements of the procedure and immediately available for all other portions of the procedure.  I have reviewed the note and edited it as necessary.    Obed Arcos M.D.  Professor  Director of Dermatologic Surgery  Department of Dermatology  [facility]    Dermatology Surgery Clinic  [facility]  [address] [place] [id number]

## 2021-11-18 LAB
PATH REPORT.COMMENTS IMP SPEC: NORMAL
PATH REPORT.COMMENTS IMP SPEC: NORMAL
PATH REPORT.FINAL DX SPEC: NORMAL
PATH REPORT.GROSS SPEC: NORMAL
PATH REPORT.MICROSCOPIC SPEC OTHER STN: NORMAL
PATH REPORT.RELEVANT HX SPEC: NORMAL

## 2022-04-28 ENCOUNTER — OFFICE VISIT (OUTPATIENT)
Dept: UROLOGY | Facility: CLINIC | Age: 62
End: 2022-04-28
Payer: COMMERCIAL

## 2022-04-28 VITALS
HEART RATE: 87 BPM | BODY MASS INDEX: 27.7 KG/M2 | HEIGHT: 69 IN | SYSTOLIC BLOOD PRESSURE: 106 MMHG | WEIGHT: 187 LBS | OXYGEN SATURATION: 97 % | DIASTOLIC BLOOD PRESSURE: 61 MMHG

## 2022-04-28 DIAGNOSIS — C61 PROSTATE CANCER (H): Primary | ICD-10-CM

## 2022-04-28 LAB — PSA SERPL-MCNC: <0.04 UG/L (ref 0–4)

## 2022-04-28 PROCEDURE — 84153 ASSAY OF PSA TOTAL: CPT | Performed by: UROLOGY

## 2022-04-28 PROCEDURE — 99213 OFFICE O/P EST LOW 20 MIN: CPT | Performed by: UROLOGY

## 2022-04-28 PROCEDURE — 36415 COLL VENOUS BLD VENIPUNCTURE: CPT | Performed by: UROLOGY

## 2022-04-28 ASSESSMENT — PAIN SCALES - GENERAL: PAINLEVEL: NO PAIN (0)

## 2022-04-28 NOTE — PROGRESS NOTES
"  CHIEF COMPLAINT   It was my pleasure to see Brice Trevino who is a 61 year old male for follow-up of prostate cancer.      HPI  Brice Trevino is a very pleasant 61 year old male who presents with a history of Prostate Cancer.  RALP completed 1/18/2019. Huron 5+4, pT3b with negative lymph nodes. Negative margins. SVI and EPE present. Intraductal carcinoma noted.      PSA had elevated to 0.08. Eligard given 11/3/2020. Completed aslvage pelvic radiotherapy 1/2021. PSA undetectable today. No longer on ADT.    Good return of continence, but still wears pad for safety. Has noted slight recent increase in leakage, but better since restarting Kegels.     PSA   4/28/2022 - <0.04  10/22/2021 - <0.04 (Testosterone 269)  4/30/2021 - <0.04  1/292021 - <0.04  10/23/2020 - 0.08  6/9/2020 - <0.04  3/13/2020 - <0.04  12/13/2019 - <0.04  9/9/2019 - <0.04  6/10/2019 - <0.04  3/11/2019 - <0.04     RALP 1/18/2019  Tumor        Histologic Type:   Adenocarcinoma (acinar, not otherwise specified)        Huron Pattern:             Primary Pattern:   Grade 5 (60%)             Secondary Pattern:   Grade 4 (40%).             Total Huron Score:   9 (Grade group: 5)   Margins:   Margins uninvolved by invasive carcinoma   Pathologic Staging (pTNM)        Primary Tumor (pT):    pT3b:  Seminal vesicle invasion        Regional Lymph Nodes (pN):   pN0: No regional lymph node metastasis             Number of Lymph Nodes Examined:   8             Number of Lymph Nodes Involved:     0        Distant Metastasis (pM):    pM N/A     Additional Pathologic Findings:  Intraductal carcinoma present.     PHYSICAL EXAM  Patient is a 61 year old  male   Vitals: Blood pressure 106/61, pulse 87, height 1.753 m (5' 9\"), weight 84.8 kg (187 lb), SpO2 97 %.  General Appearance Adult: Body mass index is 27.62 kg/m .  Alert, no acute distress, oriented  Lungs: no respiratory distress, or pursed lip breathing  Abdomen: soft, nontender, no organomegaly or " masses  Back: no CVAT  Neuro: Alert, oriented, speech and mentation normal  Psych: affect and mood normal    Outside and Past Medical records:  Review of the result(s) of each unique test - PSA    ASSESSMENT and PLAN  61 year old male with Mariano 5+4=9 prostate cancer, pT3bN0, with +SVI, +EPE, but negative margins. RALP 1/18/2019.  Noted to have PSA rise at last visit to 0.08, and given high-risk features at prostatectomy, has now completed salvage radiotherapy with short-course hormones. Will not plan additional ADT at this point. We discussed the natural history of prostate cancer in this setting, and he will plan to follow-up in 6 months with PSA. Sildenafil renewed today. Will renew efforts with his Travis.     - Follow-up 6 months with PSA    20 minutes spent on the date of the encounter doing chart review, history and exam, documentation and further activities as noted above.    Alan Sin MD  Urology  [facility]

## 2022-04-28 NOTE — NURSING NOTE
"Chief Complaint   Patient presents with     Hx of Prostate Cancer     Patient here today to discuss his PSA result that was drawn today       Blood pressure 106/61, pulse 87, height 1.753 m (5' 9\"), weight 84.8 kg (187 lb), SpO2 97 %. Body mass index is 27.62 kg/m .    Patient Active Problem List   Diagnosis     Obesity     Migraine     Prostate cancer (H)     Gastroesophageal reflux disease     Blood glucose elevated     Elevated coronary artery calcium score       Allergies   Allergen Reactions     No Known Drug Allergies        Current Outpatient Medications   Medication Sig Dispense Refill     aspirin (ASA) 81 MG EC tablet Take 1 tablet (81 mg) by mouth daily       atorvastatin (LIPITOR) 20 MG tablet Take 1 tablet (20 mg) by mouth daily 90 tablet 3     fluocinonide (LIDEX) 0.05 % external cream Apply topically 2 times daily 60 g 3     sildenafil (REVATIO) 20 MG tablet Take 1 tablet (20 mg) by mouth daily as needed (sexual activity) Take 1-5 tabs, 30-60 mins prior to sexual activity. Do not take with nitrates. 30 tablet 3     terbinafine (LAMISIL) 1 % external cream DENILSON AA BID FOR FUNGAL INFECTION  1     triamcinolone (KENALOG) 0.1 % external cream Apply topically 2 times daily 454 g 3       Social History     Tobacco Use     Smoking status: Never Smoker     Smokeless tobacco: Never Used   Substance Use Topics     Alcohol use: No     Drug use: No       [employer] GRETCHEN  4/28/2022  1:53 PM       "

## 2022-06-15 NOTE — LETTER
3/1/2017     RE: Brice Trevino  [address]   [facility] [address]     Dear Colleague,    Thank you for referring your patient, Brice Trevino, to the [facility] DERMATOLOGY at [facility]. Please see a copy of my visit note below.    [facility] Dermatology Note        Dermatology Problem List:  1. Tinea Pedis, both feet  2. Dyshidrotic eczema, right hand  3. Seborrheic keratosis  4. Onychomycosis     Encounter Date: Feb 15, 2017     CC:       Chief Complaint   Patient presents with     Derm Problem       SK removal            History of Present Illness:  Mr. Brice Trevino is a 56 year old male who presents for removal of several SKs on the face. He has one on each lower eyelid, one on the right forehead, and two on the left cheek. They bother him cosmetically but not symptomatically. He got the lidex last weekend and has been using it with improvement of the skin of his hands.      Past Medical History:       Patient Active Problem List   Diagnosis     CARDIOVASCULAR SCREENING; LDL GOAL LESS THAN 160     Indigestion     Obesity     Migraine       Past Medical History         Past Medical History   Diagnosis Date     Dermatitis            Past Surgical History           Past Surgical History   Procedure Laterality Date     Colonoscopy   4/19/2013       Procedure: COLONOSCOPY; Colonoscopy; Surgeon: Yovany Alcaraz MD; Location: [facility]            Social History:  The patient works in . The patient denies use of tanning beds.     Family History:  There is no family history of skin cancer.     Medications:   Current Outpatient Prescriptions           Current Outpatient Prescriptions   Medication Sig Dispense Refill     fluocinonide (LIDEX) 0.05 % ointment Apply topically 2 times daily 60 g 1     econazole nitrate 1 % cream Apply topically daily For one month to the soles, sides of feet, and between toes. 85 g 1     ciclopirox 0.77 % GEL Externally apply  Assessment and Plan:    1. Preventative health care  Discussed age appropriate preventative healthcare items such as cancer screenings and recommended immunizations. Discussed whether patient is using tobacco, alcohol, or illicit drugs and any concerns were discussed. Discussed maintenance of a healthy weight. Patient queried if she has any additional questions about preventative healthcare and all questions were answered.  - (In Office Administered) Zoster Recombinant Vaccine  - Comprehensive Metabolic Panel; Future  - CBC Auto Differential; Future  - Lipid Panel; Future  - TSH; Future      ______________________________________________________________________    Subjective:    Chief Complaint:  Annual exam    HPI:  Erin is a 63 y.o. year old patient here for annual exam.     She reports that she ahs a new job and has been under more stress. Initially had been too tired after work to exercise and had put on a little weight related to this. She is now getting back to more exercise.     She has been seeing Dr. Turner for chronic constipation. Taking Linzess. Has been told that she needs to increase her fiber intake. Has had a recent colonoscopy which she reports was normal.     Past Medical History:  Past Medical History:   Diagnosis Date    ADD (attention deficit disorder)     Anxiety     Hypertension 06/2020    Insomnia     Menopausal symptoms        Past Surgical History:  Past Surgical History:   Procedure Laterality Date    COLON SURGERY      decending colon removed for perforated colon/Diverticulitis    HUMERUS FRACTURE SURGERY      HY[facility]Y  1996    [facility]    NECK SURGERY         Family History:  Family History   Problem Relation Age of Onset    Colon cancer Paternal Grandfather     Colon cancer Paternal Grandmother     Diabetes Paternal Grandmother     Rheum arthritis Mother     Deep vein thrombosis Mother 88    Stroke Mother     Hypertension Mother     Heart disease Father      Diabetes type II Father     Prostate cancer Father     Diabetes Father     Hypertension Father     Hypertension Brother     Breast cancer Neg Hx     Ovarian cancer Neg Hx     Cancer Neg Hx        Social History:  Social History     Socioeconomic History    Marital status:    Tobacco Use    Smoking status: Never Smoker    Smokeless tobacco: Never Used   Substance and Sexual Activity    Alcohol use: Yes     Alcohol/week: 5.0 standard drinks     Types: 5 Standard drinks or equivalent per week    Drug use: No    Sexual activity: Not Currently   Social History Narrative    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     Social Determinants of Health     Financial Resource Strain: Medium Risk    Difficulty of Paying Living Expenses: Somewhat hard   Food Insecurity: No Food Insecurity    Worried About Running Out of Food in the Last Year: Never true    Ran Out of Food in the Last Year: Never true   Transportation Needs: No Transportation Needs    Lack of Transportation (Medical): No    Lack of Transportation (Non-Medical): No   Physical Activity: Unknown    Days of Exercise per Week: 5 days   Stress: Stress Concern Present    Feeling of Stress : To some extent   Social Connections: Unknown    Frequency of Communication with Friends and Family: More than three times a week    Frequency of Social Gatherings with Friends and Family: Three times a week    Active Member of Clubs or Organizations: Yes    Attends Club or Organization Meetings: More than 4 times per year    Marital Status:    Housing Stability: Low Risk     Unable to Pay for Housing in the Last Year: No    Number of Places Lived in the Last Year: 1    Unstable Housing in the Last Year: No       Medications:  Current Outpatient Medications on File Prior to Visit   Medication Sig Dispense Refill    ALPRAZolam (XANAX) 0.5 MG tablet Take 0.5 mg by mouth every 6 (six) hours as needed.      amLODIPine (NORVASC)  topically 2 times daily 45 g 1     triamcinolone (KENALOG) 0.1 % cream Apply sparingly to affected area three times daily as needed 80 g 2     rizatriptan (MAXALT) 5 MG tablet Take 1-2 tablets (5-10 mg) by mouth at onset of headache for migraine May repeat in 2 hours. Max 6 tablets/24 hours. 18 tablet 3              Allergies   Allergen Reactions     No Known Drug Allergies              Review of Systems:  -As per HPI  -Constitutional: The patient denies fatigue, fevers, chills, unintended weight loss, and night sweats.  -HEENT: Patient denies nonhealing oral sores.  -Skin: As above in HPI. No additional skin concerns.     Physical exam:  Vitals: There were no vitals taken for this visit.  GEN: This is a well developed, well-nourished male in no acute distress, in a pleasant mood.    SKIN: Focused examination of the face was performed.  -seborrheic keratoses x5, forehead, under right eye, under left eye, left cheek  -No other lesions of concern on areas examined.   -Right palm with a small fissure at the base of the palm     Impression/Plan:  1. Tinea pedis, per prior note:    KOH stain of skin shavings previously positive for fungus in feet, negative in hands    Econazole 1% cream once daily for 1 month    Recommend anti-fungal spray for future prevention     2. Dyshidrotic dermatitis, Right hand    Continue Lidex ointment BID and self-titrate down as tolerated        3. Seborrheic Keratosis:  Cryotherapy procedure note: After verbal consent and discussion of risks and benefits including but no limited to dyspigmentation/scar, blister, and pain, 5 was(were) treated with 1-2mm freeze border for 2 cycles with liquid nitrogen with forceps. Post cryotherapy instructions were provided.     Benign nature of lesions discussed with patient        CC Dr. Bhatia on close of this encounter.  Follow-up in 1 year, earlier for new or changing lesions.    Staff Physician Comments:   I saw and evaluated the patient with the resident  5 MG tablet TAKE ONE TABLET BY MOUTH ONE TIME DAILY 90 tablet 0    LINZESS 145 mcg Cap capsule TAKE 1 CAPSULE PO D  1    [START ON 6/26/2022] methylphenidate HCl 54 MG CR tablet Take 1 tablet (54 mg total) by mouth every morning. 30 tablet 0    zolpidem (AMBIEN) 5 MG Tab Take 1 tablet (5 mg total) by mouth every evening. 30 tablet 2    [DISCONTINUED] fluticasone propionate (FLONASE) 50 mcg/actuation nasal spray by Each Nostril route.       Current Facility-Administered Medications on File Prior to Visit   Medication Dose Route Frequency Provider Last Rate Last Admin    estradiol cypionate 5 mg/mL injection 7.5 mg  7.5 mg Intramuscular Q28 Days Zoya Soria MD   7.5 mg at 05/24/22 1600    testosterone cypionate injection 76 mg  76 mg Intramuscular Q28 Days Zoya Soria MD   76 mg at 05/24/22 1600       Allergies:  Cellulose analogues, Enteric coated aspirin [aspirin], and Nickel sutures [surgical stainless steel]    Immunizations:  Immunization History   Administered Date(s) Administered    COVID-19, MRNA, LN-S, PF (Pfizer) (Purple Cap) 03/12/2021, 04/02/2021, 11/04/2021    Influenza 10/21/2013, 10/12/2018    Influenza - Quadrivalent 11/04/2019    Influenza - Quadrivalent - PF *Preferred* (6 months and older) 10/23/2018, 09/09/2020, 11/03/2021    Tdap 01/31/2019       Review of Systems:  Review of Systems   Constitutional: Positive for activity change. Negative for unexpected weight change.   HENT: Negative for hearing loss, rhinorrhea and trouble swallowing.    Eyes: Negative for discharge and visual disturbance.   Respiratory: Negative for chest tightness and wheezing.    Cardiovascular: Negative for chest pain and palpitations.   Gastrointestinal: Negative for blood in stool, constipation, diarrhea and vomiting.   Endocrine: Negative for polydipsia and polyuria.   Genitourinary: Negative for difficulty urinating, dysuria, hematuria and menstrual problem.   Musculoskeletal: Negative for  and I agree with the assessment and plan.  I was present for the entire minor procedure and examination. SK treatments were cosmetic and payment was collected at time of visit.    Ryan Bañuelos MD  Dermatology Staff Physician  , Department of Dermatology      Again, thank you for allowing me to participate in the care of your patient.      Sincerely,    Ryan Bañuelos MD       "arthralgias, joint swelling and neck pain.   Neurological: Negative for weakness and headaches.   Psychiatric/Behavioral: Negative for confusion and dysphoric mood.     Entered by patient and reviewed and updated during visit      Objective:    Vitals:  Vitals:    06/15/22 0925   BP: 138/64   Pulse: 69   Resp: 18   SpO2: 100%   Weight: 56.4 kg (124 lb 5.4 oz)   Height: 5' 3" (1.6 m)   PainSc: 0-No pain       Physical Exam  Vitals reviewed.   Constitutional:       General: She is not in acute distress.     Appearance: She is well-developed.   Eyes:      General: No scleral icterus.  Cardiovascular:      Rate and Rhythm: Normal rate and regular rhythm.      Heart sounds: No murmur heard.  Pulmonary:      Effort: Pulmonary effort is normal. No respiratory distress.      Breath sounds: Normal breath sounds. No wheezing, rhonchi or rales.   Musculoskeletal:      Right lower leg: No edema.      Left lower leg: No edema.   Skin:     General: Skin is warm and dry.   Neurological:      Mental Status: She is alert and oriented to person, place, and time.   Psychiatric:         Behavior: Behavior normal.         Data:  Last lipids with high HDL    Elvira Buckner MD  Internal Medicine        "

## 2022-07-08 ENCOUNTER — MYC MEDICAL ADVICE (OUTPATIENT)
Dept: DERMATOLOGY | Facility: CLINIC | Age: 62
End: 2022-07-08

## 2022-07-17 NOTE — PROGRESS NOTES
[facility] Dermatology Note  Encounter Date: Jul 19, 2022  Office Visit     Dermatology Problem List:  #. Dysplastic nevus, L anterior shoulder, s/p excision 11/16/21  #. ISKs  #. Onychomycosis and history of tinea pedis  #. History of dyshidrotic eczema  ____________________________________________    Assessment & Plan:    # Neoplasm of unspecified behavior of the skin (D49.2) on the R forearm. The differential diagnosis includes SCCIS vs AK vs other.   - Shave removal performed today (see procedure note(s) below).      Procedures Performed:   - Shave removal, location(s): R forearm.  After discussion of benefits and risks including but not limited to bleeding/bruising, pain/swelling, infection, scar, incomplete removal, nerve damage/numbness, recurrence, and non-diagnostic biopsy, written consent, verbal consent and photographs were obtained. Time-out was performed. The area was cleaned with isopropyl alcohol. 0.5mL of 1% lidocaine with epinephrine was injected to obtain adequate anesthesia of each lesion. Shave removal was performed. Hemostasis was achieved with aluminium chloride. Vaseline and a sterile dressing were applied. The patient tolerated the procedure and no complications were noted. The patient was provided with verbal and written post care instructions.      Follow-up: pending path results    Staff and Scribe:     Scribe Disclosure:  I, Janet Garcia, am serving as a scribe to prep the notes for Ryan Bañuelos MD .    Staff attestation:  The documentation recorded by the scribe accurately reflects the services I personally performed and the decisions I personally made. I have made edits where needed.    Ryan Bañuelos MD  Staff Dermatologist and Dermatopathologist  , Department of Dermatology   ____________________________________________    CC: Derm Problem (Adan is here today for a lesion of concern on the right forearm)    HPI:  Mr. Brice Daniel Uriel is  a(n) 61 year old male who presents today as a return patient for lesion of concern on the R forearm. Last seen by Dr. Arcos on 11/16/21 at which point a dysplastic nevus on the L anterior shoulder was excised.     Noted new lesion of concern 7/8/22 and presents today for evaluation. It is largely asymptomatic. He applied an antifungal without benefit.    Patient is otherwise feeling well, without additional skin concerns.    Labs Reviewed:  N/A    Physical Exam:  Vitals: There were no vitals taken for this visit.  SKIN: Focused examination of R forearm was performed.  - 8mm red plaque on the R forearm with dermoscopic Sung crosses.  - No other lesions of concern on areas examined.     Medications:  Current Outpatient Medications   Medication     aspirin (ASA) 81 MG EC tablet     atorvastatin (LIPITOR) 20 MG tablet     fluocinonide (LIDEX) 0.05 % external cream     sildenafil (REVATIO) 20 MG tablet     terbinafine (LAMISIL) 1 % external cream     triamcinolone (KENALOG) 0.1 % external cream     No current facility-administered medications for this visit.      Past Medical History:   Patient Active Problem List   Diagnosis     Obesity     Migraine     Prostate cancer (H)     Gastroesophageal reflux disease     Blood glucose elevated     Elevated coronary artery calcium score     Past Medical History:   Diagnosis Date     Blood glucose elevated      Dermatitis      Elevated coronary artery calcium score 11/2018    16.8     Gastroesophageal reflux disease      Inguinal hernia     right very small     Prostate cancer (H)     robotic surgery done 1/19 by Dr. Sin, slight rise psa 10/20 had xrt and hormone therapy        CC No referring provider defined for this encounter. on close of this encounter.

## 2022-07-18 ENCOUNTER — TELEPHONE (OUTPATIENT)
Dept: FAMILY MEDICINE | Facility: CLINIC | Age: 62
End: 2022-07-18

## 2022-07-18 DIAGNOSIS — R93.1 ELEVATED CORONARY ARTERY CALCIUM SCORE: ICD-10-CM

## 2022-07-18 RX ORDER — ATORVASTATIN CALCIUM 20 MG/1
20 TABLET, FILM COATED ORAL DAILY
Qty: 90 TABLET | Refills: 0 | Status: SHIPPED | OUTPATIENT
Start: 2022-07-18 | End: 2022-11-03

## 2022-07-18 NOTE — TELEPHONE ENCOUNTER
Reason for Call:  Medication or medication refill:    Do you use a [facility]?  Name of the pharmacy and phone number for the current request:  [facility] [id number] [facility] [phone]    Name of the medication requested: Atorvastatin 20mg needs to be refilled but cant get in for a few months    Other request: none    Can we leave a detailed message on this number? YES    Phone number patient can be reached at: Cell number on file:    Telephone Information:   Mobile [phone]       Best Time: any    Call taken on 7/18/2022 at 1:19 PM by Sofy Lloyd

## 2022-07-19 ENCOUNTER — OFFICE VISIT (OUTPATIENT)
Dept: DERMATOLOGY | Facility: CLINIC | Age: 62
End: 2022-07-19
Payer: COMMERCIAL

## 2022-07-19 DIAGNOSIS — D49.2 NEOPLASM OF UNSPECIFIED BEHAVIOR OF BONE, SOFT TISSUE, AND SKIN: ICD-10-CM

## 2022-07-19 PROCEDURE — 11102 TANGNTL BX SKIN SINGLE LES: CPT | Performed by: DERMATOLOGY

## 2022-07-19 PROCEDURE — 88342 IMHCHEM/IMCYTCHM 1ST ANTB: CPT | Mod: TC | Performed by: DERMATOLOGY

## 2022-07-19 PROCEDURE — 88305 TISSUE EXAM BY PATHOLOGIST: CPT | Mod: 26 | Performed by: DERMATOLOGY

## 2022-07-19 ASSESSMENT — PAIN SCALES - GENERAL: PAINLEVEL: NO PAIN (0)

## 2022-07-19 NOTE — NURSING NOTE
Dermatology Rooming Note    Brice Trevino's goals for this visit include:   Chief Complaint   Patient presents with     Derm Problem     Adan is here today for a lesion of concern on the right forearm     Odilia Saavedra, CMA

## 2022-07-19 NOTE — LETTER
7/19/2022       RE: Brice Trevino  [address]  [facility] [address]     Dear Colleague,    Thank you for referring your patient, Brice Trevino, to the [facility] DERMATOLOGY CLINIC [place] at [facility]. Please see a copy of my visit note below.    [facility] Dermatology Note  Encounter Date: Jul 19, 2022  Office Visit     Dermatology Problem List:  #. Dysplastic nevus, L anterior shoulder, s/p excision 11/16/21  #. ISKs  #. Onychomycosis and history of tinea pedis  #. History of dyshidrotic eczema  ____________________________________________    Assessment & Plan:    # Neoplasm of unspecified behavior of the skin (D49.2) on the R forearm. The differential diagnosis includes SCCIS vs AK vs other.   - Shave removal performed today (see procedure note(s) below).      Procedures Performed:   - Shave removal, location(s): R forearm.  After discussion of benefits and risks including but not limited to bleeding/bruising, pain/swelling, infection, scar, incomplete removal, nerve damage/numbness, recurrence, and non-diagnostic biopsy, written consent, verbal consent and photographs were obtained. Time-out was performed. The area was cleaned with isopropyl alcohol. 0.5mL of 1% lidocaine with epinephrine was injected to obtain adequate anesthesia of each lesion. Shave removal was performed. Hemostasis was achieved with aluminium chloride. Vaseline and a sterile dressing were applied. The patient tolerated the procedure and no complications were noted. The patient was provided with verbal and written post care instructions.      Follow-up: pending path results    Staff and Scribe:     Scribe Disclosure:  I, Janet Garcia, am serving as a scribe to prep the notes for Ryan Bañuelos MD .    Staff attestation:  The documentation recorded by the scribe accurately reflects the services I personally performed and the decisions I  personally made. I have made edits where needed.    Ryan Bañuelos MD  Staff Dermatologist and Dermatopathologist  , Department of Dermatology   ____________________________________________    CC: Derm Problem (Adan is here today for a lesion of concern on the right forearm)    HPI:  Mr. Brice Trevino is a(n) 61 year old male who presents today as a return patient for lesion of concern on the R forearm. Last seen by Dr. Arcos on 11/16/21 at which point a dysplastic nevus on the L anterior shoulder was excised.     Noted new lesion of concern 7/8/22 and presents today for evaluation. It is largely asymptomatic. He applied an antifungal without benefit.    Patient is otherwise feeling well, without additional skin concerns.    Labs Reviewed:  N/A    Physical Exam:  Vitals: There were no vitals taken for this visit.  SKIN: Focused examination of R forearm was performed.  - 8mm red plaque on the R forearm with dermoscopic Hungarian crosses.  - No other lesions of concern on areas examined.     Medications:  Current Outpatient Medications   Medication     aspirin (ASA) 81 MG EC tablet     atorvastatin (LIPITOR) 20 MG tablet     fluocinonide (LIDEX) 0.05 % external cream     sildenafil (REVATIO) 20 MG tablet     terbinafine (LAMISIL) 1 % external cream     triamcinolone (KENALOG) 0.1 % external cream     No current facility-administered medications for this visit.      Past Medical History:   Patient Active Problem List   Diagnosis     Obesity     Migraine     Prostate cancer (H)     Gastroesophageal reflux disease     Blood glucose elevated     Elevated coronary artery calcium score     Past Medical History:   Diagnosis Date     Blood glucose elevated      Dermatitis      Elevated coronary artery calcium score 11/2018    16.8     Gastroesophageal reflux disease      Inguinal hernia     right very small     Prostate cancer (H)     robotic surgery done 1/19 by Dr. Sin, slight rise psa 10/20 had  xrt and hormone therapy        CC No referring provider defined for this encounter. on close of this encounter.

## 2022-07-19 NOTE — NURSING NOTE
Lidocaine-epinephrine 1-1:987055 % injection   1.5mL once for one use, starting 7/19/2022 ending 7/19/2022,  2mL disp, R-0, injection  Injected by Raiza Arrieta CMA

## 2022-07-19 NOTE — PATIENT INSTRUCTIONS
Wound Care After a Biopsy    What is a skin biopsy?  A skin biopsy allows the doctor to examine a very small piece of tissue under the microscope to determine the diagnosis and the best treatment for the skin condition. A local anesthetic (numbing medicine)  is injected with a very small needle into the skin area to be tested. A small piece of skin is taken from the area. Sometimes a suture (stitch) is used.     What are the risks of a skin biopsy?  I will experience scar, bleeding, swelling, pain, crusting and redness. I may experience incomplete removal or recurrence. Risks of this procedure are excessive bleeding, bruising, infection, nerve damage, numbness, thick (hypertrophic or keloidal) scar and non-diagnostic biopsy.    How should I care for my wound for the first 24 hours?  Keep the wound dry and covered for 24 hours  If it bleeds, hold direct pressure on the area for 15 minutes. If bleeding does not stop then go to the emergency room  Avoid strenuous exercise the first 1-2 days or as your doctor instructs you    How should I care for the wound after 24 hours?  After 24 hours, remove the bandage  You may bathe or shower as normal  If you had a scalp biopsy, you can shampoo as usual and can use shower water to clean the biopsy site daily  Clean the wound twice a day with gentle soap and water  Do not scrub, be gentle  Apply white petroleum/Vaseline after cleaning the wound with a cotton swab or a clean finger, and keep the site covered with a Bandaid /bandage. Bandages are not necessary with a scalp biopsy  If you are unable to cover the site with a Bandaid /bandage, re-apply ointment 2-3 times a day to keep the site moist. Moisture will help with healing  Avoid strenuous activity for first 1-2 days  Avoid lakes, rivers, pools, and oceans until the stitches are removed or the site is healed    How do I clean my wound?  Wash hands thoroughly with soap or use hand  before all wound care  Clean the  wound with gentle soap and water  Apply white petroleum/Vaseline  to wound after it is clean  Replace the Bandaid /bandage to keep the wound covered for the first few days or as instructed by your doctor  If you had a scalp biopsy, warm shower water to the area on a daily basis should suffice    What should I use to clean my wound?   Cotton-tipped applicators (Qtips )  White petroleum jelly (Vaseline ). Use a clean new container and use Q-tips to apply.  Bandaids   as needed  Gentle soap     How should I care for my wound long term?  Do not get your wound dirty  Keep up with wound care for one week or until the area is healed.  A small scab will form and fall off by itself when the area is completely healed. The area will be red and will become pink in color as it heals. Sun protection is very important for how your scar will turn out. Sunscreen with an SPF 30 or greater is recommended once the area is healed.  If you have stitches, stitches need to be removed in 14 days. You may return to our clinic for this or you may have it done locally at your doctor s office.  You should have some soreness but it should be mild and slowly go away over several days. Talk to your doctor about using tylenol for pain,    When should I call my doctor?  If you have increased:   Pain or swelling  Pus or drainage (clear or slightly yellow drainage is ok)  Temperature over 100F  Spreading redness or warmth around wound    When will I hear about my results?  The biopsy results can take 2 weeks to come back.  Your results will automatically release to "[employer]" before your provider has even reviewed them.  The clinic will call you with the results, send you a [employer] message, or have you schedule a follow-up clinic or phone time to discuss the results.  Contact our clinics if you do not hear from us in 2 weeks.    Who should I call with questions?  [facility] [place]: [phone]  [facility]  [place]: [phone]  For urgent needs outside of business hours call the [facility] at [phone] and ask for the dermatology resident on call

## 2022-07-20 NOTE — TELEPHONE ENCOUNTER
Nov 03, 2022  2:00 PM  (Arrive by 1:40 PM)  Adult Preventative Visit with Sai Benedict MD  [facility] ([facility] [place] ) [phone]        Pt has visit scheduled

## 2022-07-23 LAB
PATH REPORT.COMMENTS IMP SPEC: ABNORMAL
PATH REPORT.COMMENTS IMP SPEC: ABNORMAL
PATH REPORT.COMMENTS IMP SPEC: YES
PATH REPORT.FINAL DX SPEC: ABNORMAL
PATH REPORT.GROSS SPEC: ABNORMAL
PATH REPORT.MICROSCOPIC SPEC OTHER STN: ABNORMAL
PATH REPORT.RELEVANT HX SPEC: ABNORMAL

## 2022-08-16 ENCOUNTER — OFFICE VISIT (OUTPATIENT)
Dept: DERMATOLOGY | Facility: CLINIC | Age: 62
End: 2022-08-16
Payer: COMMERCIAL

## 2022-08-16 DIAGNOSIS — D04.61 SQUAMOUS CELL CARCINOMA IN SITU (SCCIS) OF SKIN OF RIGHT FOREARM: Primary | ICD-10-CM

## 2022-08-16 PROCEDURE — 17262 DSTRJ MAL LES T/A/L 1.1-2.0: CPT | Performed by: DERMATOLOGY

## 2022-08-16 ASSESSMENT — PAIN SCALES - GENERAL: PAINLEVEL: NO PAIN (0)

## 2022-08-16 NOTE — LETTER
8/16/2022       RE: Brice Trevino  [address]  [facility] [place] [address]     Dear Colleague,    Thank you for referring your patient, Brice Trevino, to the [facility] [place] [facility]. Please see a copy of my visit note below.    Dermatology Procedure Note: Electrodesiccation and Curettage    PREOPERATIVE DIAGNOSIS: SCC in situ arising within AK    POSTOPERATIVE DIAGNOSIS: same    LOCATION: right forearm    SIZE: 1.2 cm     Treatment options including electrodessiccation and curettage (ED and C), excision and topicals were reviewed.  The expected cure rates, healing times and anticipated scars of each option were discussed and the patient elects to proceed with ED and C.     The risks and benefits of the procedure were described to the patient.  These include but are not limited to bleeding, infection, scar, incomplete removal, and recurrence. Written informed consent was obtained. Time-out was performed. The above site was cleansed with and injected with 3.5mL1% lidocaine with epinephrine. Once anesthesia was obtained, the site was prepped with Alcohol. The lesion was curetted with  in 3 directions with a 5 mm margin and this was followed by electrodessication.  This process was repeated three times. The defect measured 1.6 cm. Vaseline and a bandage were applied to the wound. The patient tolerated the procedure well and was given post care instructions.    Clinical Follow-up: 6 months     Dr. Bañuelos staffed the patient.     Staff Involved:  Medical Student /Staff     Pt seen and discussed with attending physician, Dr Sarmad Henry, MS4    Staff Physician:  I was present with the medical student who participated in the service and in the documentation of the note. I have verified the history and personally performed the physical exam and medical decision making. I agree with the assessment and plan of care as  documented in the note.     Ryan Bañuelos MD  Staff Dermatologist and Dermatopathologist  , Department of Dermatology

## 2022-08-16 NOTE — NURSING NOTE
Dermatology Rooming Note    Brice Trevino's goals for this visit include:   Chief Complaint   Patient presents with     Derm Problem     Adan is here for an ED&C to remove an SCC on L forearm.     Armando Carter, EMT

## 2022-08-16 NOTE — PROGRESS NOTES
Dermatology Procedure Note: Electrodesiccation and Curettage    PREOPERATIVE DIAGNOSIS: SCC in situ arising within AK    POSTOPERATIVE DIAGNOSIS: same    LOCATION: right forearm    SIZE: 1.2 cm     Treatment options including electrodessiccation and curettage (ED and C), excision and topicals were reviewed.  The expected cure rates, healing times and anticipated scars of each option were discussed and the patient elects to proceed with ED and C.     The risks and benefits of the procedure were described to the patient.  These include but are not limited to bleeding, infection, scar, incomplete removal, and recurrence. Written informed consent was obtained. Time-out was performed. The above site was cleansed with and injected with 3.5mL1% lidocaine with epinephrine. Once anesthesia was obtained, the site was prepped with Alcohol. The lesion was curetted with  in 3 directions with a 5 mm margin and this was followed by electrodessication.  This process was repeated three times. The defect measured 1.6 cm. Vaseline and a bandage were applied to the wound. The patient tolerated the procedure well and was given post care instructions.    Clinical Follow-up: 6 months     Dr. Bañuelos staffed the patient.     Staff Involved:  Medical Student /Staff     Pt seen and discussed with attending physician, Dr Sarmad Henry, MS4    Staff Physician:  I was present with the medical student who participated in the service and in the documentation of the note. I have verified the history and personally performed the physical exam and medical decision making. I agree with the assessment and plan of care as documented in the note.     Ryan Bañuelos MD  Staff Dermatologist and Dermatopathologist  , Department of Dermatology

## 2022-08-20 ENCOUNTER — HEALTH MAINTENANCE LETTER (OUTPATIENT)
Age: 62
End: 2022-08-20

## 2022-10-20 ENCOUNTER — OFFICE VISIT (OUTPATIENT)
Dept: UROLOGY | Facility: CLINIC | Age: 62
End: 2022-10-20
Payer: COMMERCIAL

## 2022-10-20 VITALS
WEIGHT: 200 LBS | BODY MASS INDEX: 29.62 KG/M2 | DIASTOLIC BLOOD PRESSURE: 68 MMHG | SYSTOLIC BLOOD PRESSURE: 110 MMHG | HEIGHT: 69 IN

## 2022-10-20 DIAGNOSIS — C61 PROSTATE CANCER (H): Primary | ICD-10-CM

## 2022-10-20 DIAGNOSIS — Z90.79 S/P PROSTATECTOMY: ICD-10-CM

## 2022-10-20 DIAGNOSIS — N52.31 ERECTILE DYSFUNCTION AFTER RADICAL PROSTATECTOMY: ICD-10-CM

## 2022-10-20 LAB — PSA SERPL-MCNC: <0.04 UG/L (ref 0–4)

## 2022-10-20 PROCEDURE — 84153 ASSAY OF PSA TOTAL: CPT | Performed by: UROLOGY

## 2022-10-20 PROCEDURE — 99213 OFFICE O/P EST LOW 20 MIN: CPT | Performed by: UROLOGY

## 2022-10-20 PROCEDURE — 36415 COLL VENOUS BLD VENIPUNCTURE: CPT | Performed by: UROLOGY

## 2022-10-20 RX ORDER — SILDENAFIL CITRATE 20 MG/1
20 TABLET ORAL DAILY PRN
Qty: 30 TABLET | Refills: 11 | Status: SHIPPED | OUTPATIENT
Start: 2022-10-20 | End: 2023-10-19

## 2022-10-20 ASSESSMENT — PAIN SCALES - GENERAL: PAINLEVEL: NO PAIN (0)

## 2022-10-20 NOTE — NURSING NOTE
Chief Complaint   Patient presents with     Prostate Cancer     6 month follow up with PSA     Sada Santos CMA

## 2022-10-24 NOTE — NURSING NOTE
Chief Complaint   Patient presents with     RECHECK     Pt with a history of prostate cancer here for a six week f/u visit with sd psa     Diamond Tran   Medicine for stye was ordered.

## 2022-11-02 ASSESSMENT — ENCOUNTER SYMPTOMS
JOINT SWELLING: 0
MYALGIAS: 0
NERVOUS/ANXIOUS: 0
CHILLS: 0
SORE THROAT: 0
HEARTBURN: 1
FEVER: 0
PARESTHESIAS: 0
WEAKNESS: 0
CONSTIPATION: 0
HEADACHES: 0
HEMATURIA: 0
FREQUENCY: 0
DIZZINESS: 0
ARTHRALGIAS: 0
HEMATOCHEZIA: 0
ABDOMINAL PAIN: 0
DIARRHEA: 0
COUGH: 0
DYSURIA: 0
PALPITATIONS: 0
NAUSEA: 0

## 2022-11-02 ASSESSMENT — PATIENT HEALTH QUESTIONNAIRE - PHQ9
SUM OF ALL RESPONSES TO PHQ QUESTIONS 1-9: 4
SUM OF ALL RESPONSES TO PHQ QUESTIONS 1-9: 4

## 2022-11-02 NOTE — PROGRESS NOTES
SUBJECTIVE:   CC: Adan is an 62 year old who presents for preventative health visit.     Is doing quite well but his weight is up and he is not exercising regularly.  He has regular urology follow-up with no evidence of disease.  He does not have significant reflux.  He has a history of an elevated calcium score.  He has no complaints.      Patient has been advised of split billing requirements and indicates understanding: Yes  Healthy Habits:     Getting at least 3 servings of Calcium per day:  NO    Bi-annual eye exam:  NO    Dental care twice a year:  Yes    Sleep apnea or symptoms of sleep apnea:  Excessive snoring    Diet:  Low salt and Low fat/cholesterol    Frequency of exercise:  None    Taking medications regularly:  Yes    Medication side effects:  None    PHQ-2 Total Score: 2    Additional concerns today:  No              Today's PHQ-2 Score:   PHQ-2 ( 1999 Pf[employer]) 11/2/2022   Q1: Little interest or pleasure in doing things 2   Q2: Feeling down, depressed or hopeless 1   PHQ-2 Score 3   PHQ-2 Total Score (12-17 Years)- Positive if 3 or more points; Administer PHQ-A if positive -   Q1: Little interest or pleasure in doing things More than half the days   Q2: Feeling down, depressed or hopeless Several days   PHQ-2 Score 3       Abuse: Current or Past(Physical, Sexual or Emotional)- No  Do you feel safe in your environment? Yes               Past Medical History:      Past Medical History:   Diagnosis Date     Blood glucose elevated      Dermatitis      Elevated coronary artery calcium score 11/2018    16.8     Gastroesophageal reflux disease      Inguinal hernia     right very small     Prostate cancer (H) 2019    robotic surgery done 1/19 by Dr. Sin, slight rise psa 10/20 had xrt and hormone therapy             Past Surgical History:      Past Surgical History:   Procedure Laterality Date     BIOPSY  12/03/2018    Biopsy of Prostate     COLONOSCOPY  04/19/2013    Procedure: COLONOSCOPY;  Colonoscopy;   Surgeon: Yovany Alcaraz MD;  Location:  GI     DAVINCI PROSTATECTOMY, LYMPHADENECTOMY Bilateral 01/18/2019    Procedure: robotic assisted laparoscopic radical prostatectomy bilateral pelvic lymphandectomy;  Surgeon: Alan Sin MD;  Location: [facility] OR     RHINOPLASTY               Social History:     Social History     Socioeconomic History     Marital status: Single     Spouse name: Not on file     Number of children: 0     Years of education: Not on file     Highest education level: Not on file   Occupational History     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 No   Social History Narrative    Dairy/d 2 servings/d.     Caffeine 2 servings/d    Exercise 5 x week    Sunscreen used - Yes    Seatbelts used - Yes    Working smoke/CO detectors in the home - Yes    Guns stored in the home - No    Self Breast Exams - No    Self Testicular Exam - No    Eye Exam up to date - Yes    Dental Exam up to date - Yes    Pap Smear up to date - NA    Mammogram up to date - NA    PSA up to date - No    Dexa Scan up to date - No    Flex Sig / Colonoscopy up to date - Yes    Immunizations up to date - No    Abuse: Current or Past(Physical, Sexual or Emotional)- No    Do you feel safe in your environment - Yes             Social Determinants of Health     Financial Resource Strain: Not on file   Food Insecurity: Not on file   Transportation Needs: Not on file   Physical Activity: Not on file   Stress: Not on file   Social Connections: Not on file   Intimate Partner Violence: Not on file   Housing Stability: Not on file             Family History:   reviewed         Allergies:     Allergies   Allergen Reactions     No Known Drug Allergies              Medications:  "    Current Outpatient Medications   Medication Sig Dispense Refill     aspirin (ASA) 81 MG EC tablet Take 1 tablet (81 mg) by mouth daily       atorvastatin (LIPITOR) 20 MG tablet Take 1 tablet (20 mg) by mouth daily 90 tablet 3     fluocinonide (LIDEX) 0.05 % external cream Apply topically 2 times daily 60 g 3     sildenafil (REVATIO) 20 MG tablet Take 1 tablet (20 mg) by mouth daily as needed (sexual activity) Take 1-5 tabs, 30-60 mins prior to sexual activity. Do not take with nitrates. 30 tablet 11     terbinafine (LAMISIL) 1 % external cream DENILSON AA BID FOR FUNGAL INFECTION  1     triamcinolone (KENALOG) 0.1 % external cream Apply topically 2 times daily 454 g 3               Review of Systems:   The 10 point Review of Systems is negative other than noted in the HPI           Physical Exam:   Blood pressure 113/74, pulse 66, temperature 97  F (36.1  C), temperature source Tympanic, resp. rate 16, height 1.753 m (5' 9\"), weight 94.3 kg (208 lb), SpO2 97 %.    Exam:  Constitutional: healthy appearing, alert and in no distress  Heent: Normocephalic. Head without obvious masses or lesions. PERRLDC, EOMI. Mouth exam within normal limits: tongue, mucous membranes, posterior pharynx all normal, no lesions or abnormalities seen.  Tm's and canals within normal limits bilaterally. Neck supple, no nuchal rigidity or masses. No supraclavicular, or cervical adenopathy. Thyroid symmetric, no masses.  Cardiovascular: Regular rate and rhythm, no murmer, rub or gallops.  JVP not elevated, no edema.  Carotids within normal limits bilaterally, no bruits.  Respiratory: Normal respiratory effort.  Lungs clear, normal flow, no wheezing or crackles.  Breasts: Normal bilaterally.  No masses or lesions.  Nipples within normal limites.  No axillary lesions or nodes.  Gastrointestinal: Normal active bowel sounds.   Soft, not tender, no masses, guarding or rebound.  No hepatosplenomegaly.   Genitourinary: Rectal not done  Musculoskeletal: " extremities normal, no gross deformities noted.  Skin: no suspicious lesions or rashes   Neurologic: Mental status within normal limits.  Speech fluent.  No gross motor abnormalities and gait intact.  Psychiatric: mentation appears normal and affect normal.         Data:   Labs to be done 4 weeks as out of statin for 10 days        Assessment:   1. Normal complete physical exam  2. Cap, melissa, follow up urol  3. Elevated calcium score, med mgt  4. Elevated sugar, weight loss, exercise  5. Gerd, no issues  6. hcm         Plan:   Up to date colon  Letter with labs  Exercise, diet and weight loss  Flu shot  Labs one month as off statin last 10 days      Sai Benedict M.D.

## 2022-11-03 ENCOUNTER — OFFICE VISIT (OUTPATIENT)
Dept: FAMILY MEDICINE | Facility: CLINIC | Age: 62
End: 2022-11-03
Payer: COMMERCIAL

## 2022-11-03 VITALS
WEIGHT: 208 LBS | DIASTOLIC BLOOD PRESSURE: 74 MMHG | BODY MASS INDEX: 30.81 KG/M2 | SYSTOLIC BLOOD PRESSURE: 113 MMHG | TEMPERATURE: 97 F | HEART RATE: 66 BPM | RESPIRATION RATE: 16 BRPM | OXYGEN SATURATION: 97 % | HEIGHT: 69 IN

## 2022-11-03 DIAGNOSIS — Z00.00 ENCOUNTER FOR ANNUAL PHYSICAL EXAM: ICD-10-CM

## 2022-11-03 DIAGNOSIS — K21.9 GASTROESOPHAGEAL REFLUX DISEASE, UNSPECIFIED WHETHER ESOPHAGITIS PRESENT: ICD-10-CM

## 2022-11-03 DIAGNOSIS — R93.1 ELEVATED CORONARY ARTERY CALCIUM SCORE: ICD-10-CM

## 2022-11-03 DIAGNOSIS — C61 PROSTATE CANCER (H): ICD-10-CM

## 2022-11-03 DIAGNOSIS — R73.9 BLOOD GLUCOSE ELEVATED: ICD-10-CM

## 2022-11-03 DIAGNOSIS — Z23 NEED FOR PROPHYLACTIC VACCINATION AND INOCULATION AGAINST INFLUENZA: Primary | ICD-10-CM

## 2022-11-03 PROCEDURE — 99396 PREV VISIT EST AGE 40-64: CPT | Mod: 25 | Performed by: INTERNAL MEDICINE

## 2022-11-03 PROCEDURE — 90471 IMMUNIZATION ADMIN: CPT | Performed by: INTERNAL MEDICINE

## 2022-11-03 PROCEDURE — 90682 RIV4 VACC RECOMBINANT DNA IM: CPT | Performed by: INTERNAL MEDICINE

## 2022-11-03 RX ORDER — ATORVASTATIN CALCIUM 20 MG/1
20 TABLET, FILM COATED ORAL DAILY
Qty: 90 TABLET | Refills: 3 | Status: SHIPPED | OUTPATIENT
Start: 2022-11-03 | End: 2023-11-20

## 2022-11-03 ASSESSMENT — PAIN SCALES - GENERAL: PAINLEVEL: NO PAIN (0)

## 2022-11-03 NOTE — NURSING NOTE
Prior to immunization administration, verified patients identity using patient s name and date of birth. Please see Immunization Activity for additional information.     Screening Questionnaire for Adult Immunization    Are you sick today?   No   Do you have allergies to medications, food, a vaccine component or latex?   No   Have you ever had a serious reaction after receiving a vaccination?   No   Do you have a long-term health problem with heart, lung, kidney, or metabolic disease (e.g., diabetes), asthma, a blood disorder, no spleen, complement component deficiency, a cochlear implant, or a spinal fluid leak?  Are you on long-term aspirin therapy?   No   Do you have cancer, leukemia, HIV/AIDS, or any other immune system problem?   No   Do you have a parent, brother, or sister with an immune system problem?   No   In the past 3 months, have you taken medications that affect  your immune system, such as prednisone, other steroids, or anticancer drugs; drugs for the treatment of rheumatoid arthritis, Crohn s disease, or psoriasis; or have you had radiation treatments?   No   Have you had a seizure, or a brain or other nervous system problem?   No   During the past year, have you received a transfusion of blood or blood    products, or been given immune (gamma) globulin or antiviral drug?   No   For women: Are you pregnant or is there a chance you could become       pregnant during the next month?   No   Have you received any vaccinations in the past 4 weeks?   No     Immunization questionnaire answers were all negative.        Per orders of Dr. Benedict, injection of flu given by Kelley Dow CMA. Patient instructed to remain in clinic for 15 minutes afterwards, and to report any adverse reaction to me immediately.       Screening performed by Kelley Dow CMA on 11/3/2022 at 2:22 PM.

## 2022-11-03 NOTE — PATIENT INSTRUCTIONS
I would recommend getting the new shingles shot called shingrix, but I would do it at your pharmacy as they can check with the insurance company to see if it is paid for.    Be sure to exercise, get your weight down.    Follow up 1 month for the labs.    Sai Benedict M.D.

## 2022-12-09 ENCOUNTER — LAB (OUTPATIENT)
Dept: LAB | Facility: CLINIC | Age: 62
End: 2022-12-09
Payer: COMMERCIAL

## 2022-12-09 DIAGNOSIS — Z00.00 ENCOUNTER FOR ANNUAL PHYSICAL EXAM: ICD-10-CM

## 2022-12-09 DIAGNOSIS — R73.9 BLOOD GLUCOSE ELEVATED: ICD-10-CM

## 2022-12-09 LAB
ALBUMIN SERPL BCG-MCNC: 4.4 G/DL (ref 3.5–5.2)
ALP SERPL-CCNC: 68 U/L (ref 40–129)
ALT SERPL W P-5'-P-CCNC: 45 U/L (ref 10–50)
ANION GAP SERPL CALCULATED.3IONS-SCNC: 9 MMOL/L (ref 7–15)
AST SERPL W P-5'-P-CCNC: 31 U/L (ref 10–50)
BILIRUB SERPL-MCNC: 0.4 MG/DL
BUN SERPL-MCNC: 17.9 MG/DL (ref 8–23)
CALCIUM SERPL-MCNC: 9.6 MG/DL (ref 8.8–10.2)
CHLORIDE SERPL-SCNC: 104 MMOL/L (ref 98–107)
CHOLEST SERPL-MCNC: 155 MG/DL
CREAT SERPL-MCNC: 0.97 MG/DL (ref 0.67–1.17)
DEPRECATED HCO3 PLAS-SCNC: 27 MMOL/L (ref 22–29)
ERYTHROCYTE [DISTWIDTH] IN BLOOD BY AUTOMATED COUNT: 12.4 % (ref 10–15)
GFR SERPL CREATININE-BSD FRML MDRD: 88 ML/MIN/1.73M2
GLUCOSE SERPL-MCNC: 128 MG/DL (ref 70–99)
HBA1C MFR BLD: 6.4 % (ref 0–5.6)
HCT VFR BLD AUTO: 40.8 % (ref 40–53)
HDLC SERPL-MCNC: 55 MG/DL
HGB BLD-MCNC: 13.4 G/DL (ref 13.3–17.7)
LDLC SERPL CALC-MCNC: 85 MG/DL
MCH RBC QN AUTO: 31.9 PG (ref 26.5–33)
MCHC RBC AUTO-ENTMCNC: 32.8 G/DL (ref 31.5–36.5)
MCV RBC AUTO: 97 FL (ref 78–100)
NONHDLC SERPL-MCNC: 100 MG/DL
PLATELET # BLD AUTO: 188 10E3/UL (ref 150–450)
POTASSIUM SERPL-SCNC: 4.7 MMOL/L (ref 3.4–5.3)
PROT SERPL-MCNC: 6.6 G/DL (ref 6.4–8.3)
RBC # BLD AUTO: 4.2 10E6/UL (ref 4.4–5.9)
SODIUM SERPL-SCNC: 140 MMOL/L (ref 136–145)
TRIGL SERPL-MCNC: 77 MG/DL
WBC # BLD AUTO: 4.9 10E3/UL (ref 4–11)

## 2022-12-09 PROCEDURE — 85027 COMPLETE CBC AUTOMATED: CPT

## 2022-12-09 PROCEDURE — 83036 HEMOGLOBIN GLYCOSYLATED A1C: CPT

## 2022-12-09 PROCEDURE — 80053 COMPREHEN METABOLIC PANEL: CPT

## 2022-12-09 PROCEDURE — 80061 LIPID PANEL: CPT

## 2022-12-09 PROCEDURE — 36415 COLL VENOUS BLD VENIPUNCTURE: CPT

## 2022-12-09 NOTE — RESULT ENCOUNTER NOTE
Mr. Trevino,    Should be able to view your lab reports.    Your CBC your complete blood count is normal with no signs of anemia or blood disorders.    Your chemistry panel shows normal blood salts, kidney tests, liver tests, and proteins.    Your blood sugar is slightly high at 128.  A second diabetes test called the hemoglobin A1c is also high at 6.4.  This indicates prediabetes.  While you do not need medication for it you may down the road.  The best way to improve this is through regular exercise, keeping your weight down, and a healthy diet.  I would like you to come back in 6 months to repeat this.    Your total cholesterol is super at 155.  Your HDL or good cholesterol and LDL or bad cholesterol are both very good.    Please let me know if you have questions.  Do not forget to come back in 6 months.    Sai Benedict M.D.

## 2023-01-25 ENCOUNTER — TELEPHONE (OUTPATIENT)
Dept: GASTROENTEROLOGY | Facility: CLINIC | Age: 63
End: 2023-01-25
Payer: COMMERCIAL

## 2023-01-25 ENCOUNTER — HOSPITAL ENCOUNTER (OUTPATIENT)
Facility: CLINIC | Age: 63
End: 2023-01-25
Attending: COLON & RECTAL SURGERY | Admitting: COLON & RECTAL SURGERY
Payer: COMMERCIAL

## 2023-01-25 DIAGNOSIS — Z12.11 ENCOUNTER FOR SCREENING COLONOSCOPY: Primary | ICD-10-CM

## 2023-01-25 NOTE — TELEPHONE ENCOUNTER
"    Screening Questions  BLUE  KIND OF PREP RED  LOCATION [review exclusion criteria] GREEN  SEDATION TYPE        y Are you active on mychart?      Jak Ordering/Referring Provider?        [employer] What type of coverage do you have?      n Have you had a positive covid test in the last 14 days?     30.4 1. BMI  [BMI 40+ - review exclusion criteria]    y  2. Are you able to give consent for your medical care? [IF NO,RN REVIEW]          n  3. Are you taking any prescription pain medications on a routine schedule   (ex narcotics: tramadol, oxycodone, roxicodone, oxycontin,  and percocet)?        n  3a. EXTENDED PREP What kind of prescription?     n 4. Do you have any chemical dependencies such as alcohol, street drugs, or methadone?        **If yes 3- 5 , please schedule with MAC sedation.**          IF YES TO ANY 6 - 10 - HOSPITAL SETTING ONLY.     n 6.   Do you need assistance transferring?     n 7.   Have you had a heart or lung transplant?    n 8.   Are you currently on dialysis?   n 9.   Do you use daily home oxygen?   n 10. Do you take nitroglycerin?   10a. n If yes, how often?     11. [FEMALES]  n Are you currently pregnant?    11a. n If yes, how many weeks? [ Greater than 12 weeks, OR NEEDED]    n 12. Do you have Pulmonary Hypertension? *NEED PAC APPT AT [facility]*     n 13. [review exclusion criteria]  Do you have any implantable devices in your body (pacemaker, defib, LVAD)?    n 14. In the past 6 months, have you had any heart related issues including cardiomyopathy or heart attack?     14a. n If yes, did it require cardiac stenting if so when?     n 15. Have you had a stroke or Transient ischemic attack (TIA - aka  mini stroke ) within 6 months?      n 16. Do you have mod to severe Obstructive Sleep Apnea?  [Hospital only]    n 17. Do you have SEVERE AND UNCONTROLLED asthma? *NEED PAC APPT AT [facility]*     n 18. Are you currently taking any blood thinners?     18a. If yes, inform patient to \"follow up w/ ordering " "provider for bridging instructions.\"    n 19. Do you take the medication Phentermine?    19a. If yes, \"Hold for 7 days before procedure.  Please consult your prescribing provider if you have questions about holding this medication.\"     n  20. Do you have chronic kidney disease?      n  21. Do you have a diagnosis of diabetes?     n  22. On a regular basis do you go 3-5 days between bowel movements?      23. Preferred LOCAL Pharmacy for Pre Prescription    [ LIST ONLY ONE PHARMACY]          [place], [place] - [place], [place] - [address] [place] [address]        - CLOSING REMINDERS -    Informed patient they will need an adult    Cannot take any type of public or medical transportation alone    Conscious Sedation- Needs  for 6 hours after the procedure       MAC/General-Needs  for 24 hours after procedure    Pre-Procedure Covid test to be completed [[facility] PCR Testing Required]    Confirmed Nurse will call to complete assessment       - SCHEDULING DETAILS -  n Hospital Setting Required? If yes, what is the exclusion?: gilberto Robles  Surgeon    3/22  Date of Procedure  Lower Endoscopy [Colonoscopy]  Type of Procedure Scheduled  [facility]-If you answer yes to questions #8, #20, #21Which Colonoscopy Prep was Sent?     mac Sedation Type     n PAC / Pre-op Required                 "

## 2023-02-03 ENCOUNTER — MYC MEDICAL ADVICE (OUTPATIENT)
Dept: FAMILY MEDICINE | Facility: CLINIC | Age: 63
End: 2023-02-03
Payer: COMMERCIAL

## 2023-02-07 NOTE — TELEPHONE ENCOUNTER
Patient Contact    Attempt # 1    Was call answered?  No. Left message on voicemail with information to call clinic back.    On callback, please determine if heartburn/reflux is new or worsening symptom and triage if necessary.    MyC message sent to patient requesting a message or callback to nurse triage.    Kristin Vasquez, RN  [facility]

## 2023-02-08 ENCOUNTER — NURSE TRIAGE (OUTPATIENT)
Dept: FAMILY MEDICINE | Facility: CLINIC | Age: 63
End: 2023-02-08
Payer: COMMERCIAL

## 2023-02-08 NOTE — TELEPHONE ENCOUNTER
Writer called patient to schedule per Dr. Benedict below. Patient states he cannot come into the clinic within the next 2 days because he is in class until at least 5pm.    Patient states he will go to urgent care within the next 2 days as he is not able to come into the clinic within office hours.    Routing to PCP as FYI, please route back to triage if any further recommendation.    Mari Mcguire RN  [facility]

## 2023-02-08 NOTE — TELEPHONE ENCOUNTER
Nurse Triage SBAR    Is this a 2nd Level Triage? YES, LICENSED PRACTITIONER REVIEW IS REQUIRED    Situation: Patient calling clinic reporting worsening heartburn symptoms after eating. Patient stated that more recently in the past month after eating he has been getting worsening heartburn and Tums have not been helping as much as they have before.     Background: Patient has history of GERD and stated that these new episodes feel like previous GERD episodes but are a little worse.     Assessment: Patient does not endorse chest pain/pressure, no difficulty breathing, no visible sweat on face, and no pain radiating to jaw or arm. Patient reported that after most recent meal he was not having heartburn symptoms but that after eating meal last night he developed symptoms. Patient does not endorse any abdominal injury or vomiting.     Protocol Recommended Disposition:   Callback By PCP Within 1 Hour    Recommendation: Per disposition, RN advised patient that PCP will call back within 1 hour after reviewing this note. RN advised patient to call 911 if chest or chest pressure develops or if pain occurs that radiates to the arm of jaw. Patient agreed with plan of care and will be in a class until 4:00 pm.     Routed to provider    Does the patient meet one of the following criteria for ADS visit consideration? 16+ years old, with an [facility] PCP     TIP  Providers, please consider if this condition is appropriate for management at one of our Acute and Diagnostic Services sites.     If patient is a good candidate, please use dotphrase <dot>triageresponse and select Refer to ADS to document.      Reason for Disposition    Patient says chest pain feels exactly the same as previously diagnosed 'heartburn' and describes burning in chest and accompanying sour taste in mouth    Additional Information    Negative: SEVERE difficulty breathing (e.g., struggling for each breath, speaks in single words)    Negative: Passed out (i.e.,  fainted, collapsed and was not responding)    Negative: Difficult to awaken or acting confused (e.g., disoriented, slurred speech)    Negative: Shock suspected (e.g., cold/pale/clammy skin, too weak to stand, low BP, rapid pulse)    Negative: Chest pain lasting longer than 5 minutes and ANY of the following:* Over 44 years old* Over 30 years old and at least one cardiac risk factor (e.g., diabetes mellitus, high blood pressure, high cholesterol, smoker, or strong family history of heart disease)* History of heart disease (i.e., angina, heart attack, heart failure, bypass surgery, takes nitroglycerin)* Pain is crushing, pressure-like, or heavy    Negative: Heart beating < 50 beats per minute OR > 140 beats per minute    Negative: Visible sweat on face or sweat dripping down face    Negative: Sounds like a life-threatening emergency to the triager    Negative: Followed an injury to chest    Negative: SEVERE chest pain    Negative: Pain also in shoulder(s) or arm(s) or jaw    Negative: Difficulty breathing    Negative: Cocaine use within last 3 days    Negative: Major surgery in the past month    Negative: Hip or leg fracture (broken bone) in past month (or had cast on leg or ankle in past month)    Negative: Illness requiring prolonged bedrest in past month (e.g., immobilization, long hospital stay)    Negative: Long-distance travel in past month (e.g., car, bus, train, plane; with trip lasting 6 or more hours)    Negative: History of prior 'blood clot' in leg or lungs (i.e., deep vein thrombosis, pulmonary embolism)    Negative: History of inherited increased risk of blood clots (e.g., Factor 5 Leiden, Anti-thrombin 3, Protein C or Protein S deficiency, Prothrombin mutation)    Negative: Cancer treatment in the past two months (or has cancer now)    Negative: Heart beating irregularly or very rapidly    Negative: Chest pain lasting longer than 5 minutes and occurred in last 3 days (72 hours) (Exception: feels exactly  the same as previously diagnosed heartburn and has accompanying sour taste in mouth)    Negative: Chest pain or 'angina' comes and goes and is happening more often (increasing in frequency) or getting worse (increasing in severity) (Exception: chest pains that last only a few seconds)    Negative: Dizziness or lightheadedness    Negative: Coughing up blood    Negative: Patient sounds very sick or weak to the triager    Protocols used: CHEST PAIN-A-OH

## 2023-02-10 ENCOUNTER — OFFICE VISIT (OUTPATIENT)
Dept: FAMILY MEDICINE | Facility: CLINIC | Age: 63
End: 2023-02-10
Payer: COMMERCIAL

## 2023-02-10 VITALS
TEMPERATURE: 97.7 F | WEIGHT: 207 LBS | OXYGEN SATURATION: 95 % | DIASTOLIC BLOOD PRESSURE: 66 MMHG | BODY MASS INDEX: 30.66 KG/M2 | HEART RATE: 75 BPM | HEIGHT: 69 IN | SYSTOLIC BLOOD PRESSURE: 104 MMHG | RESPIRATION RATE: 16 BRPM

## 2023-02-10 DIAGNOSIS — C61 PROSTATE CANCER (H): ICD-10-CM

## 2023-02-10 DIAGNOSIS — R12 HEARTBURN: Primary | ICD-10-CM

## 2023-02-10 PROCEDURE — 99213 OFFICE O/P EST LOW 20 MIN: CPT | Performed by: INTERNAL MEDICINE

## 2023-02-10 RX ORDER — FAMOTIDINE 40 MG/1
TABLET, FILM COATED ORAL
Qty: 90 TABLET | Refills: 3 | Status: SHIPPED | OUTPATIENT
Start: 2023-02-10 | End: 2023-12-05

## 2023-02-10 ASSESSMENT — PAIN SCALES - GENERAL: PAINLEVEL: NO PAIN (0)

## 2023-02-10 NOTE — PROGRESS NOTES
This is a very pleasant 62-year-old who I am seeing for evaluation of heartburn.  The patient has heartburn for many years.  He called in recently because of this as noted and reviewed.  He was having worsening heartburn but actually it is better today.  He has heartburn tends to be after he eats without right upper quadrant pain or nausea.  No dysphagia.  No fevers or weight loss.  Tums usually resolves it although not always.  It is not exertional.  No shortness of breath or coughs.    Past Medical History:   Diagnosis Date     Blood glucose elevated      Dermatitis      Elevated coronary artery calcium score 11/2018    16.8     Gastroesophageal reflux disease      Inguinal hernia     right very small     Prostate cancer (H) 2019    robotic surgery done 1/19 by Dr. Sin, slight rise psa 10/20 had xrt and hormone therapy     Past Surgical History:   Procedure Laterality Date     BIOPSY  12/03/2018    Biopsy of Prostate     COLONOSCOPY  04/19/2013    Procedure: COLONOSCOPY;  Colonoscopy;  Surgeon: Yovany Alcaraz MD;  Location:  GI     DAVINCI PROSTATECTOMY, LYMPHADENECTOMY Bilateral 01/18/2019    Procedure: robotic assisted laparoscopic radical prostatectomy bilateral pelvic lymphandectomy;  Surgeon: Alan Sin MD;  Location: [facility] OR     RHINOPLASTY       Social History     Socioeconomic History     Marital status: Single     Spouse name: Not on file     Number of children: 0     Years of education: Not on file     Highest education level: Not on file   Occupational History     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 No   Social History Narrative    Dairy/d 2 servings/d.     Caffeine 2 servings/d    Exercise 5 x week    Sunscreen used - Yes     "Seatbelts used - Yes    Working smoke/CO detectors in the home - Yes    Guns stored in the home - No    Self Breast Exams - No    Self Testicular Exam - No    Eye Exam up to date - Yes    Dental Exam up to date - Yes    Pap Smear up to date - NA    Mammogram up to date - NA    PSA up to date - No    Dexa Scan up to date - No    Flex Sig / Colonoscopy up to date - Yes    Immunizations up to date - No    Abuse: Current or Past(Physical, Sexual or Emotional)- No    Do you feel safe in your environment - Yes             Social Determinants of Health     Financial Resource Strain: Not on file   Food Insecurity: Not on file   Transportation Needs: Not on file   Physical Activity: Not on file   Stress: Not on file   Social Connections: Not on file   Intimate Partner Violence: Not on file   Housing Stability: Not on file     Current Outpatient Medications   Medication Sig Dispense Refill     aspirin (ASA) 81 MG EC tablet Take 1 tablet (81 mg) by mouth daily       atorvastatin (LIPITOR) 20 MG tablet Take 1 tablet (20 mg) by mouth daily 90 tablet 3     famotidine (PEPCID) 40 MG tablet Take one pill daily but you can use one pill twice daily if needed. 90 tablet 3     fluocinonide (LIDEX) 0.05 % external cream Apply topically 2 times daily 60 g 3     sildenafil (REVATIO) 20 MG tablet Take 1 tablet (20 mg) by mouth daily as needed (sexual activity) Take 1-5 tabs, 30-60 mins prior to sexual activity. Do not take with nitrates. 30 tablet 11     terbinafine (LAMISIL) 1 % external cream DENILSON AA BID FOR FUNGAL INFECTION  1     triamcinolone (KENALOG) 0.1 % external cream Apply topically 2 times daily 454 g 3     Allergies   Allergen Reactions     No Known Drug Allergies      FAMILY HISTORY NOTED AND REVIEWED    REVIEW OF SYSTEMS: above    PHYSICAL EXAM    /66 (BP Location: Right arm, Patient Position: Sitting, Cuff Size: Adult Large)   Pulse 75   Temp 97.7  F (36.5  C) (Temporal)   Resp 16   Ht 1.753 m (5' 9\")   Wt 93.9 kg " (207 lb)   SpO2 95%   BMI 30.57 kg/m      Patient appears non toxic  Abdomen normal active bowel sounds, soft, non-tender no mgr, no hepatosplenomegaly  Mouth within normal limits    ASSESSMENT:  Heartburn, most c/w gerd, doubt malig cause, doubt cv or othr cause    PLAN:  Weight loss  pepcid 40mg bid  Call if c/o not resolving soon    Sai Benedict M.D.

## 2023-02-10 NOTE — PATIENT INSTRUCTIONS
Try the famotidine up to twice daily.  If this does not relieve your symptoms within a few days let me know.    Sai Benedict M.D.

## 2023-03-16 RX ORDER — BISACODYL 5 MG/1
TABLET, DELAYED RELEASE ORAL
Qty: 4 TABLET | Refills: 0 | Status: SHIPPED | OUTPATIENT
Start: 2023-03-16 | End: 2023-03-17 | Stop reason: HOSPADM

## 2023-03-16 NOTE — OR NURSING
Patient called and left a voicemail in  regards to  his upcoming Colonoscopy 3/22/23 under [facility]. Writer noted that he had 2 colonoscopies. He received conscious sedation 2013 [facility] Endoscopy clinic and [facility] 2016 at [facility]. He was educated  on the requirements at [facility] Endocopy clinic on Pre-op physical prior to exam. He said he needed to to think thru his options and will call back if he needs to reschedule during this encounter. He wanted to remain on the schedule may consider conscious sedation.         Writer informed him to call pre-op call number if he wants to reschedule. Also writer instructed him regarding the pre-op needing to be within 30 days prior to the exam for Anesthesia under MAC. Patient verbalized understanding of requirements.        Patient understands that he needs to call scheduling to reschedule procedure and to also schedule pre op physical within the required 30 days prior to the exam.     Rita Morrison   RN,BSN, CGRN

## 2023-03-17 ENCOUNTER — TELEPHONE (OUTPATIENT)
Dept: GASTROENTEROLOGY | Facility: CLINIC | Age: 63
End: 2023-03-17
Payer: COMMERCIAL

## 2023-03-17 ENCOUNTER — HOSPITAL ENCOUNTER (OUTPATIENT)
Facility: CLINIC | Age: 63
End: 2023-03-17
Attending: INTERNAL MEDICINE | Admitting: INTERNAL MEDICINE
Payer: COMMERCIAL

## 2023-03-17 NOTE — TELEPHONE ENCOUNTER
Caller:   Reason for Reschedule/Cancellation (please be detailed, any staff messages or encounters to note?): WAS CANCELED CALLED BACK  TO RESCHEDULE      Prior to reschedule please review:    Ordering Provider:JOSI BEST    Sedation per order:MAC    Does patient have any ASC Exclusions, please identify?:       Notes on Cancelled Procedure:    Procedure:Lower Endoscopy [Colonoscopy]     Date: 3/22    Location:[facility]; [address] [place] [address]    Surgeon: MARK        Rescheduled: Yes    Procedure: Lower Endoscopy [Colonoscopy]     Date: 5/3    Location:[facility]; [address] [place] [address]    Surgeon: MARK    Sedation Level Scheduled  MAC,  Reason for Sedation Level ORDER    Prep/Instructions updated and sent: KEVIN

## 2023-04-17 ENCOUNTER — TELEPHONE (OUTPATIENT)
Dept: GASTROENTEROLOGY | Facility: CLINIC | Age: 63
End: 2023-04-17
Payer: COMMERCIAL

## 2023-04-17 NOTE — TELEPHONE ENCOUNTER
Caller: Adan  Reason for Reschedule/Cancellation (please be detailed, any staff messages or encounters to note?): needs new date      Prior to reschedule please review:    Ordering Provider:Jak    Sedation per order:MAC    Does patient have any ASC Exclusions, please identify?: n      Notes on Cancelled Procedure:    Procedure:Lower Endoscopy [Colonoscopy]     Date: 5/3    Location:[facility]; [address] [place] [address]    Surgeon: Travis        Rescheduled: Yes    Procedure: Lower Endoscopy [Colonoscopy]     Date: 6/1    Location:[facility]; [address] [place] [address]    Surgeon: Fely    Sedation Level Scheduled  MAC,  Reason for Sedation Level order    Prep/Instructions updated and sent:

## 2023-04-20 ENCOUNTER — OFFICE VISIT (OUTPATIENT)
Dept: UROLOGY | Facility: CLINIC | Age: 63
End: 2023-04-20
Payer: COMMERCIAL

## 2023-04-20 VITALS
BODY MASS INDEX: 31.37 KG/M2 | OXYGEN SATURATION: 98 % | HEIGHT: 68 IN | DIASTOLIC BLOOD PRESSURE: 74 MMHG | SYSTOLIC BLOOD PRESSURE: 121 MMHG | WEIGHT: 207 LBS | HEART RATE: 87 BPM

## 2023-04-20 DIAGNOSIS — C61 PROSTATE CANCER (H): Primary | ICD-10-CM

## 2023-04-20 LAB — PSA SERPL-MCNC: <0.04 UG/L (ref 0–4)

## 2023-04-20 PROCEDURE — 84153 ASSAY OF PSA TOTAL: CPT | Performed by: UROLOGY

## 2023-04-20 PROCEDURE — 99213 OFFICE O/P EST LOW 20 MIN: CPT | Performed by: UROLOGY

## 2023-04-20 PROCEDURE — 36415 COLL VENOUS BLD VENIPUNCTURE: CPT | Performed by: UROLOGY

## 2023-04-20 ASSESSMENT — PAIN SCALES - GENERAL: PAINLEVEL: NO PAIN (0)

## 2023-04-20 NOTE — PROGRESS NOTES
"  CHIEF COMPLAINT   It was my pleasure to see Brice Trevino who is a 62 year old male for follow-up of Prostate Cancer.      HPI  Brice Trevino is a very pleasant 62 year old male who presents with a history of Prostate Cancer.  RALP completed 1/18/2019. [place] 5+4, pT3b with negative lymph nodes. Negative margins. SVI and EPE present. Intraductal carcinoma noted.      PSA had elevated to 0.08. Eligard given 11/3/2020. Completed salvage radiotherapy 1/2021. PSA undetectable today. No longer on ADT.     Good return of continence, but still wears pad for safety. Improving with Kegels.     PSA   4/20/2023 - <0.04  10/20/2022 - <0.04  4/28/2022 - <0.04  10/22/2021 - <0.04 (Testosterone 269)  4/30/2021 - <0.04  1/292021 - <0.04  10/23/2020 - 0.08  6/9/2020 - <0.04  3/13/2020 - <0.04  12/13/2019 - <0.04  9/9/2019 - <0.04  6/10/2019 - <0.04  3/11/2019 - <0.04     RALP 1/18/2019  Tumor        Histologic Type:   Adenocarcinoma (acinar, not otherwise specified)        [place] Pattern:             Primary Pattern:   Grade 5 (60%)             Secondary Pattern:   Grade 4 (40%).             Total [place] Score:   9 (Grade group: 5)   Margins:   Margins uninvolved by invasive carcinoma   Pathologic Staging (pTNM)        Primary Tumor (pT):    pT3b:  Seminal vesicle invasion        Regional Lymph Nodes (pN):   pN0: No regional lymph node metastasis             Number of Lymph Nodes Examined:   8             Number of Lymph Nodes Involved:     0        Distant Metastasis (pM):    pM N/A     Additional Pathologic Findings:  Intraductal carcinoma present.   PHYSICAL EXAM  Patient is a 62 year old  male   Vitals: Blood pressure 121/74, pulse 87, height 1.727 m (5' 8\"), weight 93.9 kg (207 lb), SpO2 98 %.  General Appearance Adult: Body mass index is 31.47 kg/m .  Alert, no acute distress, oriented  Lungs: no respiratory distress, or pursed lip breathing  Abdomen: soft, nontender, no organomegaly or masses  Back: no CVAT  Neuro: " Alert, oriented, speech and mentation normal  Psych: affect and mood normal    Outside and Past Medical records:  Review of the result(s) of each unique test - PSA     ASSESSMENT and PLAN  62 year old male with [place] 5+4=9 prostate cancer, pT3bN0, with +SVI, +EPE, but negative margins. RALP 1/18/2019.  Noted to have PSA rise at last visit to 0.08, and given high-risk features at prostatectomy, has now completed salvage radiotherapy with short-course hormones in 2020.   Sildenafil renewed today. Having some headaches and will switch to tadalafil if persists over next few months. Will continue with sildenafil.   Will renew efforts with his Kegels. Consider return to pelvic floor PT if not improving.     - Follow-up 6 months with PSA    15 minutes spent on the date of the encounter doing chart review, history and exam, documentation and further activities as noted above.    Alan Sin MD  Urology  [facility]

## 2023-04-20 NOTE — NURSING NOTE
Chief Complaint   Patient presents with     Prostate Cancer     Here in clinic for psa and follow up   talk about the psa today   Everything is going well pr patient  Horace Chahal, clinic assistant

## 2023-04-20 NOTE — Clinical Note
4/20/2023       RE: Brice Trevino  [address]  [place] [address]     Dear Colleague,    Thank you for referring your patient, Brice Trevino, to the [facility] at [facility]. Please see a copy of my visit note below.      CHIEF COMPLAINT   It was my pleasure to see Brice Trevino who is a 62 year old male for follow-up of Prostate Cancer.      HPI  Brice Trevino is a very pleasant 62 year old male who presents with a history of Prostate Cancer.  RALP completed 1/18/2019. [place] 5+4, pT3b with negative lymph nodes. Negative margins. SVI and EPE present. Intraductal carcinoma noted.      PSA had elevated to 0.08. Eligard given 11/3/2020. Completed salvage radiotherapy 1/2021. PSA undetectable today. No longer on ADT.     Good return of continence, but still wears pad for safety. Improving with Kegels.     PSA   4/20/2023 - <0.04  10/20/2022 - <0.04  4/28/2022 - <0.04  10/22/2021 - <0.04 (Testosterone 269)  4/30/2021 - <0.04  1/292021 - <0.04  10/23/2020 - 0.08  6/9/2020 - <0.04  3/13/2020 - <0.04  12/13/2019 - <0.04  9/9/2019 - <0.04  6/10/2019 - <0.04  3/11/2019 - <0.04     RALP 1/18/2019  Tumor        Histologic Type:   Adenocarcinoma (acinar, not otherwise specified)        Mariano Pattern:             Primary Pattern:   Grade 5 (60%)             Secondary Pattern:   Grade 4 (40%).             Total Mariano Score:   9 (Grade group: 5)   Margins:   Margins uninvolved by invasive carcinoma   Pathologic Staging (pTNM)        Primary Tumor (pT):    pT3b:  Seminal vesicle invasion        Regional Lymph Nodes (pN):   pN0: No regional lymph node metastasis             Number of Lymph Nodes Examined:   8             Number of Lymph Nodes Involved:     0        Distant Metastasis (pM):    pM N/A     Additional Pathologic Findings:  Intraductal carcinoma present.        PHYSICAL EXAM  Patient is a 62 year old  male   Vitals:  "Blood pressure 121/74, pulse 87, height 1.727 m (5' 8\"), weight 93.9 kg (207 lb), SpO2 98 %.  General Appearance Adult: Body mass index is 31.47 kg/m .  Alert, no acute distress, oriented  Lungs: no respiratory distress, or pursed lip breathing  Abdomen: soft, nontender, no organomegaly or masses; ***  Back: *** CVAT  Neuro: Alert, oriented, speech and mentation normal  Psych: affect and mood normal  : {MICHAEL EXAM MALE GENITAL:223030}    Outside and Past Medical records:  Review of the result(s) of each unique test - PSA     ASSESSMENT and PLAN  62 year old male with Mariano 5+4=9 prostate cancer, pT3bN0, with +SVI, +EPE, but negative margins. RALP 1/18/2019.  Noted to have PSA rise at last visit to 0.08, and given high-risk features at prostatectomy, has now completed salvage radiotherapy with short-course hormones. Will not plan additional ADT at this point.   Sildenafil renewed today. Having some headaches and will switch to tadalafil if persists over next few months. Will continue with sildenafil.   Will renew efforts with his Kegels. Consider return to pelvic floor PT if not improving.     - Follow-up 6 months with PSA      *** minutes spent on the date of the encounter doing chart review, history and exam, documentation and further activities as noted above.    Alan Sin MD  Urology  [facility]        CHIEF COMPLAINT   It was my pleasure to see Brice Trevino who is a 62 year old male for follow-up of Prostate Cancer.      HPI  Brice Trevino is a very pleasant 62 year old male who presents with a history of Prostate Cancer.  RALP completed 1/18/2019. [place] 5+4, pT3b with negative lymph nodes. Negative margins. SVI and EPE present. Intraductal carcinoma noted.      PSA had elevated to 0.08. Eligard given 11/3/2020. Completed salvage radiotherapy 1/2021. PSA undetectable today. No longer on ADT.     Good return of continence, but still wears pad for safety. Improving with " "Travis.     PSA   4/20/2023 - <0.04  10/20/2022 - <0.04  4/28/2022 - <0.04  10/22/2021 - <0.04 (Testosterone 269)  4/30/2021 - <0.04  1/292021 - <0.04  10/23/2020 - 0.08  6/9/2020 - <0.04  3/13/2020 - <0.04  12/13/2019 - <0.04  9/9/2019 - <0.04  6/10/2019 - <0.04  3/11/2019 - <0.04     RALP 1/18/2019  Tumor        Histologic Type:   Adenocarcinoma (acinar, not otherwise specified)        Mariano Pattern:             Primary Pattern:   Grade 5 (60%)             Secondary Pattern:   Grade 4 (40%).             Total Mariano Score:   9 (Grade group: 5)   Margins:   Margins uninvolved by invasive carcinoma   Pathologic Staging (pTNM)        Primary Tumor (pT):    pT3b:  Seminal vesicle invasion        Regional Lymph Nodes (pN):   pN0: No regional lymph node metastasis             Number of Lymph Nodes Examined:   8             Number of Lymph Nodes Involved:     0        Distant Metastasis (pM):    pM N/A     Additional Pathologic Findings:  Intraductal carcinoma present.   PHYSICAL EXAM  Patient is a 62 year old  male   Vitals: Blood pressure 121/74, pulse 87, height 1.727 m (5' 8\"), weight 93.9 kg (207 lb), SpO2 98 %.  General Appearance Adult: Body mass index is 31.47 kg/m .  Alert, no acute distress, oriented  Lungs: no respiratory distress, or pursed lip breathing  Abdomen: soft, nontender, no organomegaly or masses  Back: no CVAT  Neuro: Alert, oriented, speech and mentation normal  Psych: affect and mood normal    Outside and Past Medical records:  Review of the result(s) of each unique test - PSA     ASSESSMENT and PLAN  62 year old male with [place] 5+4=9 prostate cancer, pT3bN0, with +SVI, +EPE, but negative margins. RALP 1/18/2019.  Noted to have PSA rise at last visit to 0.08, and given high-risk features at prostatectomy, has now completed salvage radiotherapy with short-course hormones in 2020.   Sildenafil renewed today. Having some headaches and will switch to tadalafil if persists over next few months. " Will continue with sildenafil.   Will renew efforts with his Kegels. Consider return to pelvic floor PT if not improving.     - Follow-up 6 months with PSA    15 minutes spent on the date of the encounter doing chart review, history and exam, documentation and further activities as noted above.    Alan Sin MD  Urology  [facility]        Again, thank you for allowing me to participate in the care of your patient.      Sincerely,    Alan Sin MD     no

## 2023-05-16 ENCOUNTER — TELEPHONE (OUTPATIENT)
Dept: GASTROENTEROLOGY | Facility: CLINIC | Age: 63
End: 2023-05-16

## 2023-05-16 DIAGNOSIS — K62.5 BRBPR (BRIGHT RED BLOOD PER RECTUM): Primary | ICD-10-CM

## 2023-05-16 RX ORDER — BISACODYL 5 MG/1
TABLET, DELAYED RELEASE ORAL
Qty: 4 TABLET | Refills: 0 | Status: ON HOLD | OUTPATIENT
Start: 2023-05-16 | End: 2023-07-27

## 2023-05-16 NOTE — TELEPHONE ENCOUNTER
Previously rescheduled colonoscopy.    Patient scheduled for Colonoscopy on 6/1/23.     Discuss Covid policy.     Pre op exam needed? Yes. Pre op physical is required - scheduled for 5/24/23 with Lauro Cotto MD    Arrival time: 0810. Procedure time 0910    Facility location: [facility]; [facility] [address] [place] [address]    Sedation type: MAC    NSAIDs? Yes.  ASA 81 mg - no indication to hold    Anticoagulations? No    Electronic implanted devices? No    Diabetic? No - Prediabetes    Indication for procedure: BRBPR     Bowel prep recommendation: Standard Golytely d/t mag citrate recall    Prep instructions sent via Razz     Bowel prep script sent to    [facility], [place] - [place], [place] - [address] [place] [address]    Pre visit planning completed.      Elly Gomez RN  Endoscopy Procedure Pre Assessment RN

## 2023-05-17 ENCOUNTER — TELEPHONE (OUTPATIENT)
Dept: GASTROENTEROLOGY | Facility: CLINIC | Age: 63
End: 2023-05-17
Payer: COMMERCIAL

## 2023-05-17 NOTE — TELEPHONE ENCOUNTER
Attempted to contact patient regarding upcoming Colonoscopy procedure on 6/1/23 for pre assessment questions.    Patient stated that he needs to reschedule due to not having on  on this particular date.     Patient was transferred to endoscopy scheduling team to assist with rescheduling procedure.       Elly Gomez RN  Endoscopy Procedure Pre Assessment RN

## 2023-05-17 NOTE — TELEPHONE ENCOUNTER
Caller:   Reason for Reschedule/Cancellation (please be detailed, any staff messages or encounters to note?): NO       Prior to reschedule please review:    Ordering Provider:JOSI BEST    Sedation per order:MAC    Does patient have any ASC Exclusions, please identify?: N      Notes on Cancelled Procedure:    Procedure:Lower Endoscopy [Colonoscopy]     Date: 6/1    Location:[facility]; [address] [place] [address]    Surgeon: GHANSHYAM        Rescheduled: Yes    Procedure: Lower Endoscopy [Colonoscopy]     Date: 7/20    Location:[facility]; [address] [place] [address]    Surgeon: GHANSHYAM    Sedation Level Scheduled  MAC,  Reason for Sedation Level ORDER    Prep/Instructions updated and sent: N

## 2023-05-25 ENCOUNTER — TELEPHONE (OUTPATIENT)
Dept: GASTROENTEROLOGY | Facility: CLINIC | Age: 63
End: 2023-05-25
Payer: COMMERCIAL

## 2023-05-25 NOTE — TELEPHONE ENCOUNTER
Caller:Adan   Reason for Reschedule/Cancellation (please be detailed, any staff messages or encounters to note?): Per patient no .      Prior to reschedule please review:    Ordering Provider:JOSI BEST    Sedation per order:MAC    Does patient have any ASC Exclusions, please identify?: N      Notes on Cancelled Procedure:    Procedure:Lower Endoscopy [Colonoscopy]     Date: 07/20    Location:[facility]; [facility] [address] [place] [address]    Surgeon: GHANSHYAM         Rescheduled: No

## 2023-05-25 NOTE — TELEPHONE ENCOUNTER
Caller: Brice Trevino    Reason for Reschedule/Cancellation (please be detailed, any staff messages or encounters to note?): per pt has a ride to schedule       Prior to reschedule please review:    Ordering Provider:JOSI BEST    Sedation per order:MAC    Does patient have any ASC Exclusions, please identify?: N      Notes on Cancelled Procedure:    Procedure:Lower Endoscopy [Colonoscopy]     Date: 7/20/23    Location:[facility]; [address] [place] [address]    Surgeon: GHANSHYAM        Rescheduled: Yes    Procedure: Lower Endoscopy [Colonoscopy]     Date: 7/27    Location:[facility]; [address] [place] [address]    Surgeon: MARK    Sedation Level Scheduled  MAC,  Reason for Sedation Level PER ORDER    Prep/Instructions updated and sent: YES/BRIE

## 2023-06-07 ENCOUNTER — DOCUMENTATION ONLY (OUTPATIENT)
Dept: LAB | Facility: CLINIC | Age: 63
End: 2023-06-07

## 2023-06-07 DIAGNOSIS — R73.9 BLOOD GLUCOSE ELEVATED: Primary | ICD-10-CM

## 2023-06-07 NOTE — PROGRESS NOTES
Patient have Lab appointment on 6/14/23 and there is no orders. Please order test if needed.  Thanks  Teri Parada MLT([facility])

## 2023-06-14 ENCOUNTER — LAB (OUTPATIENT)
Dept: LAB | Facility: CLINIC | Age: 63
End: 2023-06-14
Payer: COMMERCIAL

## 2023-06-14 DIAGNOSIS — R73.9 BLOOD GLUCOSE ELEVATED: ICD-10-CM

## 2023-06-14 LAB — HBA1C MFR BLD: 6.3 % (ref 0–5.6)

## 2023-06-14 PROCEDURE — 83036 HEMOGLOBIN GLYCOSYLATED A1C: CPT

## 2023-06-14 PROCEDURE — 36415 COLL VENOUS BLD VENIPUNCTURE: CPT

## 2023-06-16 ENCOUNTER — OFFICE VISIT (OUTPATIENT)
Dept: FAMILY MEDICINE | Facility: CLINIC | Age: 63
End: 2023-06-16
Payer: COMMERCIAL

## 2023-06-16 VITALS
WEIGHT: 205 LBS | HEIGHT: 68 IN | DIASTOLIC BLOOD PRESSURE: 68 MMHG | BODY MASS INDEX: 31.07 KG/M2 | SYSTOLIC BLOOD PRESSURE: 103 MMHG | OXYGEN SATURATION: 96 % | TEMPERATURE: 97.7 F | RESPIRATION RATE: 16 BRPM | HEART RATE: 79 BPM

## 2023-06-16 DIAGNOSIS — M22.8X1 OTHER DISORDERS OF PATELLA, RIGHT KNEE: ICD-10-CM

## 2023-06-16 DIAGNOSIS — R73.9 BLOOD GLUCOSE ELEVATED: Primary | ICD-10-CM

## 2023-06-16 PROCEDURE — 99212 OFFICE O/P EST SF 10 MIN: CPT | Performed by: INTERNAL MEDICINE

## 2023-06-16 ASSESSMENT — PAIN SCALES - GENERAL: PAINLEVEL: NO PAIN (0)

## 2023-06-16 NOTE — PROGRESS NOTES
The patient is here to follow-up on his elevated A1c.  He has changed his diet and has lost weight and is excited about that.  He plans to continue on this and incorporate exercise.  We reviewed the A1c together.    He also notes that his right knee can crack at times.  However its not painful.  There is no swelling.  The left knee is fine and it does not limit him.    Past Medical History:   Diagnosis Date     Blood glucose elevated      Dermatitis      Elevated coronary artery calcium score 11/2018    16.8     Gastroesophageal reflux disease      Inguinal hernia     right very small     Prostate cancer (H) 2019    robotic surgery done 1/19 by Dr. Sin, slight rise psa 10/20 had xrt and hormone therapy     Past Surgical History:   Procedure Laterality Date     BIOPSY  12/03/2018    Biopsy of Prostate     COLONOSCOPY  04/19/2013    Procedure: COLONOSCOPY;  Colonoscopy;  Surgeon: Yovany Alcaraz MD;  Location: [facility]     DAVINCI PROSTATECTOMY, LYMPHADENECTOMY Bilateral 01/18/2019    Procedure: robotic assisted laparoscopic radical prostatectomy bilateral pelvic lymphandectomy;  Surgeon: Alan Sin MD;  Location: [facility] OR     RHINOPLASTY       Social History     Socioeconomic History     Marital status: Single     Spouse name: Not on file     Number of children: 0     Years of education: Not on file     Highest education level: Not on file   Occupational History     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 No   Social History Narrative    Dairy/d 2 servings/d.     Caffeine 2 servings/d    Exercise 5 x week    Sunscreen used - Yes    Seatbelts used - Yes    Working smoke/CO detectors in the home - Yes    Guns  stored in the home - No    Self Breast Exams - No    Self Testicular Exam - No    Eye Exam up to date - Yes    Dental Exam up to date - Yes    Pap Smear up to date - NA    Mammogram up to date - NA    PSA up to date - No    Dexa Scan up to date - No    Flex Sig / Colonoscopy up to date - Yes    Immunizations up to date - No    Abuse: Current or Past(Physical, Sexual or Emotional)- No    Do you feel safe in your environment - Yes             Social Determinants of Health     Financial Resource Strain: Not on file   Food Insecurity: Not on file   Transportation Needs: Not on file   Physical Activity: Not on file   Stress: Not on file   Social Connections: Not on file   Intimate Partner Violence: Not on file   Housing Stability: Not on file     Current Outpatient Medications   Medication Sig Dispense Refill     aspirin (ASA) 81 MG EC tablet Take 1 tablet (81 mg) by mouth daily       atorvastatin (LIPITOR) 20 MG tablet Take 1 tablet (20 mg) by mouth daily 90 tablet 3     bisacodyl (DULCOLAX) 5 MG EC tablet Take 2 tablets at 3 pm the day before your procedure. If your procedure is before 11 am, take 2 additional tablets at 11 pm. If your procedure is after 11 am, take 2 additional tablets at 6 am. For additional instructions refer to your colonoscopy prep instructions. 4 tablet 0     famotidine (PEPCID) 40 MG tablet Take one pill daily but you can use one pill twice daily if needed. 90 tablet 3     fluocinonide (LIDEX) 0.05 % external cream Apply topically 2 times daily 60 g 3     polyethylene glycol (GOLYTELY) 236 g suspension The night before the exam at 6 pm drink an 8-ounce glass every 15 minutes until the jug is half empty. If you arrive before 11 AM: Drink the other half of the Golytely jug at 11 PM night before procedure. If you arrive after 11 AM: Drink the other half of the Golytely jug at 6 AM day of procedure. For additional instructions refer to your colonoscopy prep instructions. 4000 mL 0     sildenafil  "(REVATIO) 20 MG tablet Take 1 tablet (20 mg) by mouth daily as needed (sexual activity) Take 1-5 tabs, 30-60 mins prior to sexual activity. Do not take with nitrates. 30 tablet 11     terbinafine (LAMISIL) 1 % external cream DENILSON AA BID FOR FUNGAL INFECTION  1     triamcinolone (KENALOG) 0.1 % external cream Apply topically 2 times daily 454 g 3     Allergies   Allergen Reactions     No Known Drug Allergy      FAMILY HISTORY NOTED AND REVIEWED    REVIEW OF SYSTEMS: above    PHYSICAL EXAM    /68 (BP Location: Right arm, Patient Position: Sitting, Cuff Size: Adult Large)   Pulse 79   Temp 97.7  F (36.5  C) (Temporal)   Resp 16   Ht 1.727 m (5' 8\")   Wt 93 kg (205 lb)   SpO2 96%   BMI 31.17 kg/m      Patient appears non toxic  Knee examination bilaterally is unremarkable.  There is no redness, warmth, tenderness or instability.    ASSESSMENT:  1. Elevated sugar, exercise, diet, weight loss  2. Knee cracking, neg exam, call if changes or pain    PLAN:  Above  Follow up complete physical exam Carter Benedict M.D.        "

## 2023-06-28 ENCOUNTER — OFFICE VISIT (OUTPATIENT)
Dept: FAMILY MEDICINE | Facility: CLINIC | Age: 63
End: 2023-06-28
Payer: COMMERCIAL

## 2023-06-28 VITALS
DIASTOLIC BLOOD PRESSURE: 69 MMHG | SYSTOLIC BLOOD PRESSURE: 104 MMHG | BODY MASS INDEX: 31.39 KG/M2 | HEIGHT: 68 IN | OXYGEN SATURATION: 95 % | HEART RATE: 70 BPM | TEMPERATURE: 98.8 F | WEIGHT: 207.13 LBS | RESPIRATION RATE: 16 BRPM

## 2023-06-28 DIAGNOSIS — Z01.818 PREOP GENERAL PHYSICAL EXAM: Primary | ICD-10-CM

## 2023-06-28 DIAGNOSIS — Z12.11 COLON CANCER SCREENING: ICD-10-CM

## 2023-06-28 PROCEDURE — 99214 OFFICE O/P EST MOD 30 MIN: CPT | Performed by: INTERNAL MEDICINE

## 2023-06-28 ASSESSMENT — PAIN SCALES - GENERAL: PAINLEVEL: NO PAIN (0)

## 2023-06-28 NOTE — PROGRESS NOTES
[address] [address]  Phone: [phone]  Primary Provider: Sai Benedict  Pre-op Performing Provider: KAMILAH TORRES      PREOPERATIVE EVALUATION:  Today's date: 6/28/2023    Brice Trevino is a 62 year old male who presents for a preoperative evaluation.      6/28/2023     2:46 PM   Additional Questions   Roomed by Diane Lynne Schoenherr RN     Surgical Information:  Surgery/Procedure: Colonoscopy  Surgery Location:  GI  Surgeon: Cathi Robles MD  Surgery Date: 7/27/2023  Time of Surgery: 12 PM  Where patient plans to recover: At home with family  Fax number for surgical facility: Note does not need to be faxed, will be available electronically in Epic.    Assessment & Plan     The proposed surgical procedure is considered LOW risk.    Preop general physical exam  He is in good physical health  Denies any complaints whatsoever  No history of any diabetes or CAD or CVA  Exercise tolerance is good at least at 7 METS or more  No history of any exertional chest pain or shortness of breath  He is medically optimized for the procedure  He is on aspirin which I told him to stop 7 days prior to the procedure  He will be cleared for the procedure without any further testing    Colon cancer screening  He is going to have colonoscopy with anesthesia        Possible Sleep Apnea: NO           - No identified additional risk factors other than previously addressed    Antiplatelet or Anticoagulation Medication Instructions:   - aspirin: Discontinue aspirin 7-10 days prior to procedure to reduce bleeding risk. It should be resumed postoperatively.     Additional Medication Instructions:  Patient is to take all scheduled medications on the day of surgery    RECOMMENDATION:  APPROVAL GIVEN to proceed with proposed procedure, without further diagnostic evaluation.      30 minutes spent by me on the date of the encounter doing chart review, history  and exam, documentation and further activities per the note      Subjective       HPI related to upcoming procedure: He is going to get a routine screening colonoscopy but this will be done under general anesthesia        6/28/2023    12:03 PM   Preop Questions   1. Have you ever had a heart attack or stroke? No   2. Have you ever had surgery on your heart or blood vessels, such as a stent placement, a coronary artery bypass, or surgery on an artery in your head, neck, heart, or legs? No   3. Do you have chest pain with activity? No   4. Do you have a history of  heart failure? No   5. Do you currently have a cold, bronchitis or symptoms of other infection? No   6. Do you have a cough, shortness of breath, or wheezing? No   7. Do you or anyone in your family have previous history of blood clots? No   8. Do you or does anyone in your family have a serious bleeding problem such as prolonged bleeding following surgeries or cuts? No   9. Have you ever had problems with anemia or been told to take iron pills? No   10. Have you had any abnormal blood loss such as black, tarry or bloody stools? No   11. Have you ever had a blood transfusion? UNKNOWN    12. Are you willing to have a blood transfusion if it is medically needed before, during, or after your surgery? Yes   13. Have you or any of your relatives ever had problems with anesthesia? No   14. Do you have sleep apnea, excessive snoring or daytime drowsiness? UNKNOWN    15. Do you have any artifical heart valves or other implanted medical devices like a pacemaker, defibrillator, or continuous glucose monitor? No   16. Do you have artificial joints? No   17. Are you allergic to latex? No       Health Care Directive:  Patient does not have a Health Care Directive or Living Will: Discussed advance care planning with patient; information given to patient to review.    Status of Chronic Conditions:  See problem list for active medical problems.  Problems all longstanding  and stable, except as noted/documented.  See ROS for pertinent symptoms related to these conditions.      Review of Systems  Constitutional, neuro, ENT, endocrine, pulmonary, cardiac, gastrointestinal, genitourinary, musculoskeletal, integument and psychiatric systems are negative, except as otherwise noted.    Patient Active Problem List    Diagnosis Date Noted     Gastroesophageal reflux disease      Priority: Medium     Blood glucose elevated      Priority: Medium     Prostate cancer (H) 12/04/2018     Priority: Medium     Elevated coronary artery calcium score 11/01/2018     Priority: Medium     16.8       Migraine 12/09/2016     Priority: Medium     Obesity 09/26/2014     Priority: Medium      Past Medical History:   Diagnosis Date     Blood glucose elevated      Dermatitis      Elevated coronary artery calcium score 11/2018    16.8     Gastroesophageal reflux disease      Inguinal hernia     right very small     Prostate cancer (H) 2019    robotic surgery done 1/19 by Dr. Sin, slight rise psa 10/20 had xrt and hormone therapy     Past Surgical History:   Procedure Laterality Date     BIOPSY  12/03/2018    Biopsy of Prostate     COLONOSCOPY  04/19/2013    Procedure: COLONOSCOPY;  Colonoscopy;  Surgeon: Yovany Alcaraz MD;  Location:  GI     DAVINCI PROSTATECTOMY, LYMPHADENECTOMY Bilateral 01/18/2019    Procedure: robotic assisted laparoscopic radical prostatectomy bilateral pelvic lymphandectomy;  Surgeon: Alan Sin MD;  Location: [facility] OR     RHINOPLASTY       Current Outpatient Medications   Medication Sig Dispense Refill     aspirin (ASA) 81 MG EC tablet Take 1 tablet (81 mg) by mouth daily       atorvastatin (LIPITOR) 20 MG tablet Take 1 tablet (20 mg) by mouth daily 90 tablet 3     famotidine (PEPCID) 40 MG tablet Take one pill daily but you can use one pill twice daily if needed. 90 tablet 3     fluocinonide (LIDEX) 0.05 % external cream Apply topically 2 times daily 60 g 3      sildenafil (REVATIO) 20 MG tablet Take 1 tablet (20 mg) by mouth daily as needed (sexual activity) Take 1-5 tabs, 30-60 mins prior to sexual activity. Do not take with nitrates. 30 tablet 11     terbinafine (LAMISIL) 1 % external cream DENILSON AA BID FOR FUNGAL INFECTION  1     triamcinolone (KENALOG) 0.1 % external cream Apply topically 2 times daily 454 g 3     bisacodyl (DULCOLAX) 5 MG EC tablet Take 2 tablets at 3 pm the day before your procedure. If your procedure is before 11 am, take 2 additional tablets at 11 pm. If your procedure is after 11 am, take 2 additional tablets at 6 am. For additional instructions refer to your colonoscopy prep instructions. (Patient not taking: Reported on 6/28/2023) 4 tablet 0     polyethylene glycol (GOLYTELY) 236 g suspension The night before the exam at 6 pm drink an 8-ounce glass every 15 minutes until the jug is half empty. If you arrive before 11 AM: Drink the other half of the Golytely jug at 11 PM night before procedure. If you arrive after 11 AM: Drink the other half of the Golytely jug at 6 AM day of procedure. For additional instructions refer to your colonoscopy prep instructions. (Patient not taking: Reported on 6/28/2023) 4000 mL 0       Allergies   Allergen Reactions     No Known Drug Allergy         Social History     Tobacco Use     Smoking status: Never     Smokeless tobacco: Never   Substance Use Topics     Alcohol use: No     Family History   Problem Relation Age of Onset     Diabetes Father         type 2     Prostate Cancer Father      Gastrointestinal Disease Mother         reflux     Early Death Mother      Eye Disorder Maternal Grandfather         cateracts     Heart Disease Paternal Grandfather         heart attack age 60's     Melanoma No family hx of      Skin Cancer No family hx of      History   Drug Use No         Objective     /69 (BP Location: Right arm, Patient Position: Sitting, Cuff Size: Adult Large)   Pulse 70   Temp 98.8  F (37.1  C)  "(Oral)   Resp 16   Ht 1.727 m (5' 8\")   Wt 94 kg (207 lb 2 oz)   SpO2 95%   BMI 31.49 kg/m      Physical Exam    GENERAL APPEARANCE: healthy, alert and no distress     EYES: EOMI,  PERRL     RESP: lungs clear to auscultation - no rales, rhonchi or wheezes     CV: regular rates and rhythm, normal S1 S2, no S3 or S4 and no murmur, click or rub     ABDOMEN:  soft, nontender, no HSM or masses and bowel sounds normal     MS: extremities normal- no gross deformities noted, no evidence of inflammation in joints, FROM in all extremities.     SKIN: no suspicious lesions or rashes     NEURO: Normal strength and tone, sensory exam grossly normal, mentation intact and speech normal     PSYCH: mentation appears normal. and affect normal/bright     LYMPHATICS: No cervical adenopathy    Recent Labs   Lab Test 06/14/23  1141 12/09/22  0834 08/10/21  0831   HGB  --  13.4 13.8   PLT  --  188 179   NA  --  140  --    POTASSIUM  --  4.7  --    CR  --  0.97  --    A1C 6.3* 6.4*  --         Diagnostics:  No labs were ordered during this visit.   No EKG required for low risk surgery (cataract, skin procedure, breast biopsy, etc).  No EKG required, no history of coronary heart disease, significant arrhythmia, peripheral arterial disease or other structural heart disease.    Revised Cardiac Risk Index (RCRI):  The patient has the following serious cardiovascular risks for perioperative complications:   - No serious cardiac risks = 0 points     RCRI Interpretation: 0 points: Class I (very low risk - 0.4% complication rate)           Signed Electronically by: Lauro Cotto MD  Copy of this evaluation report is provided to requesting physician.      "

## 2023-07-26 RX ORDER — ONDANSETRON 2 MG/ML
4 INJECTION INTRAMUSCULAR; INTRAVENOUS
Status: CANCELLED | OUTPATIENT
Start: 2023-07-26

## 2023-07-26 RX ORDER — LIDOCAINE 40 MG/G
CREAM TOPICAL
Status: CANCELLED | OUTPATIENT
Start: 2023-07-26

## 2023-07-27 ENCOUNTER — HOSPITAL ENCOUNTER (OUTPATIENT)
Facility: CLINIC | Age: 63
Discharge: HOME | End: 2023-07-27
Attending: COLON & RECTAL SURGERY | Admitting: COLON & RECTAL SURGERY
Payer: COMMERCIAL

## 2023-07-27 VITALS
HEIGHT: 68 IN | OXYGEN SATURATION: 93 % | SYSTOLIC BLOOD PRESSURE: 106 MMHG | BODY MASS INDEX: 31.37 KG/M2 | RESPIRATION RATE: 17 BRPM | WEIGHT: 207 LBS | DIASTOLIC BLOOD PRESSURE: 69 MMHG | HEART RATE: 66 BPM

## 2023-07-27 LAB — COLONOSCOPY: NORMAL

## 2023-07-27 PROCEDURE — 45378 DIAGNOSTIC COLONOSCOPY: CPT | Performed by: COLON & RECTAL SURGERY

## 2023-07-27 PROCEDURE — 250N000011 HC RX IP 250 OP 636: Performed by: COLON & RECTAL SURGERY

## 2023-07-27 PROCEDURE — G0121 COLON CA SCRN NOT HI RSK IND: HCPCS | Performed by: COLON & RECTAL SURGERY

## 2023-07-27 RX ORDER — ONDANSETRON 2 MG/ML
INJECTION INTRAMUSCULAR; INTRAVENOUS PRN
Status: DISCONTINUED | OUTPATIENT
Start: 2023-07-27 | End: 2023-07-27 | Stop reason: HOSPADM

## 2023-07-27 RX ORDER — FENTANYL CITRATE 50 UG/ML
INJECTION, SOLUTION INTRAMUSCULAR; INTRAVENOUS PRN
Status: DISCONTINUED | OUTPATIENT
Start: 2023-07-27 | End: 2023-07-27 | Stop reason: HOSPADM

## 2023-07-27 ASSESSMENT — ACTIVITIES OF DAILY LIVING (ADL)
ADLS_ACUITY_SCORE: 33
ADLS_ACUITY_SCORE: 35

## 2023-07-27 NOTE — H&P
History and physical examination reviewed  Will proceed with screening colonoscopy using conscious sedation     VIOLETA FLORES MD

## 2023-10-04 ENCOUNTER — PATIENT OUTREACH (OUTPATIENT)
Dept: CARE COORDINATION | Facility: CLINIC | Age: 63
End: 2023-10-04
Payer: COMMERCIAL

## 2023-10-04 NOTE — PROGRESS NOTES
"  CHIEF COMPLAINT   It was my pleasure to see Brice Trevino who is a 62 year old male for follow-up of Prostate Cancer.      HPI  Brice rTevino is a very pleasant 62 year old male who presents with a history of Prostate Cancer.  RALP completed 1/18/2019. [place] 5+4, pT3b with negative lymph nodes. Negative margins. SVI and EPE present. Intraductal carcinoma noted.      PSA had elevated to 0.08. Eligard given 11/3/2020. Completed aslvage pelvic radiotherapy 1/2021. PSA undetectable today. No longer on ADT.     Good return of continence, but still wears pad for safety. Has noted slight recent increase in leakage, but better since restarting Kegels.     PSA   10/20/2022 - <0.04  4/28/2022 - <0.04  10/22/2021 - <0.04 (Testosterone 269)  4/30/2021 - <0.04  1/292021 - <0.04  10/23/2020 - 0.08  6/9/2020 - <0.04  3/13/2020 - <0.04  12/13/2019 - <0.04  9/9/2019 - <0.04  6/10/2019 - <0.04  3/11/2019 - <0.04     RALP 1/18/2019  Tumor        Histologic Type:   Adenocarcinoma (acinar, not otherwise specified)        [place] Pattern:             Primary Pattern:   Grade 5 (60%)             Secondary Pattern:   Grade 4 (40%).             Total Mariano Score:   9 (Grade group: 5)   Margins:   Margins uninvolved by invasive carcinoma   Pathologic Staging (pTNM)        Primary Tumor (pT):    pT3b:  Seminal vesicle invasion        Regional Lymph Nodes (pN):   pN0: No regional lymph node metastasis             Number of Lymph Nodes Examined:   8             Number of Lymph Nodes Involved:     0        Distant Metastasis (pM):    pM N/A     Additional Pathologic Findings:  Intraductal carcinoma present.     PHYSICAL EXAM  Patient is a 62 year old  male   Vitals: Blood pressure 110/68, height 1.753 m (5' 9\"), weight 90.7 kg (200 lb).  General Appearance Adult: Body mass index is 29.53 kg/m .  Alert, no acute distress, oriented  Lungs: no respiratory distress, or pursed lip breathing  Abdomen: soft, nontender, no organomegaly or " As expected masses  Back: no CVAT  Neuro: Alert, oriented, speech and mentation normal  Psych: affect and mood normal    Outside and Past Medical records:  Review of the result(s) of each unique test - PSA    ASSESSMENT and PLAN  62 year old male with Mariano 5+4=9 prostate cancer, pT3bN0, with +SVI, +EPE, but negative margins. RALP 1/18/2019.  Noted to have PSA rise at last visit to 0.08, and given high-risk features at prostatectomy, has now completed salvage radiotherapy with short-course hormones. Will not plan additional ADT at this point. We discussed the natural history of prostate cancer in this setting, and he will plan to follow-up in 6 months with PSA.   Sildenafil renewed today. Having some headaches and will switch to tadalafil if persists over next few months.  Will renew efforts with his Kegels. Consider return to pelvic floor PT if not improving.     - Follow-up 6 months with PSA    15 minutes spent on the date of the encounter doing chart review, history and exam, documentation and further activities as noted above.    Alan Sin MD  Urology  [facility]

## 2023-10-18 ENCOUNTER — PATIENT OUTREACH (OUTPATIENT)
Dept: CARE COORDINATION | Facility: CLINIC | Age: 63
End: 2023-10-18
Payer: COMMERCIAL

## 2023-10-19 ENCOUNTER — OFFICE VISIT (OUTPATIENT)
Dept: UROLOGY | Facility: CLINIC | Age: 63
End: 2023-10-19
Payer: COMMERCIAL

## 2023-10-19 VITALS
SYSTOLIC BLOOD PRESSURE: 109 MMHG | WEIGHT: 202 LBS | BODY MASS INDEX: 30.62 KG/M2 | OXYGEN SATURATION: 95 % | DIASTOLIC BLOOD PRESSURE: 73 MMHG | HEIGHT: 68 IN | HEART RATE: 77 BPM

## 2023-10-19 DIAGNOSIS — N52.31 ERECTILE DYSFUNCTION AFTER RADICAL PROSTATECTOMY: ICD-10-CM

## 2023-10-19 DIAGNOSIS — C61 PROSTATE CANCER (H): Primary | ICD-10-CM

## 2023-10-19 DIAGNOSIS — Z90.79 S/P PROSTATECTOMY: ICD-10-CM

## 2023-10-19 LAB — PSA SERPL-MCNC: <0.04 UG/L (ref 0–4)

## 2023-10-19 PROCEDURE — 99213 OFFICE O/P EST LOW 20 MIN: CPT | Performed by: UROLOGY

## 2023-10-19 PROCEDURE — 84153 ASSAY OF PSA TOTAL: CPT | Performed by: UROLOGY

## 2023-10-19 PROCEDURE — 36415 COLL VENOUS BLD VENIPUNCTURE: CPT | Performed by: UROLOGY

## 2023-10-19 RX ORDER — SILDENAFIL CITRATE 20 MG/1
20 TABLET ORAL DAILY PRN
Qty: 30 TABLET | Refills: 11 | Status: SHIPPED | OUTPATIENT
Start: 2023-10-19

## 2023-10-19 ASSESSMENT — PAIN SCALES - GENERAL: PAINLEVEL: NO PAIN (0)

## 2023-10-19 NOTE — Clinical Note
10/19/2023       RE: Brice Trevino  [address]  [place] [facility] [place] [address]     Dear Colleague,    Thank you for referring your patient, Brice Trevino, to the [facility]. Please see a copy of my visit note below.      CHIEF COMPLAINT   It was my pleasure to see Brice Trevino who is a 63 year old male for follow-up of Prostate Cancer.      HPI  Brice Trevino is a very pleasant 63 year old male who presents with a history of Prostate Cancer.  RALP completed 1/18/2019. Mariano 5+4, pT3b with negative lymph nodes. Negative margins. SVI and EPE present. Intraductal carcinoma noted.      PSA had elevated to 0.08. Eligard given 11/3/2020. Completed salvage radiotherapy 1/2021. PSA undetectable today. No longer on ADT.     Good return of continence, but still wears pad for safety. Improving with Kegels.     PSA   10/19/2023 -   4/20/2023 - <0.04  10/20/2022 - <0.04  4/28/2022 - <0.04  10/22/2021 - <0.04 (Testosterone 269)  4/30/2021 - <0.04  1/292021 - <0.04  10/23/2020 - 0.08  6/9/2020 - <0.04  3/13/2020 - <0.04  12/13/2019 - <0.04  9/9/2019 - <0.04  6/10/2019 - <0.04  3/11/2019 - <0.04     RALP 1/18/2019  Tumor        Histologic Type:   Adenocarcinoma (acinar, not otherwise specified)        [place] Pattern:             Primary Pattern:   Grade 5 (60%)             Secondary Pattern:   Grade 4 (40%).             Total Mariano Score:   9 (Grade group: 5)   Margins:   Margins uninvolved by invasive carcinoma   Pathologic Staging (pTNM)        Primary Tumor (pT):    pT3b:  Seminal vesicle invasion        Regional Lymph Nodes (pN):   pN0: No regional lymph node metastasis             Number of Lymph Nodes Examined:   8             Number of Lymph Nodes Involved:     0        Distant Metastasis (pM):    pM N/A     Additional Pathologic Findings:  Intraductal carcinoma present.     PHYSICAL EXAM  Patient is a 63 year old   "male   Vitals: Blood pressure 109/73, pulse 77, height 1.727 m (5' 8\"), weight 91.6 kg (202 lb), SpO2 95%.  General Appearance Adult: Body mass index is 30.71 kg/m .  Alert, no acute distress, oriented  Lungs: no respiratory distress, or pursed lip breathing  Abdomen: soft, nontender, no organomegaly or masses; ***  Back: *** CVAT  Neuro: Alert, oriented, speech and mentation normal  Psych: affect and mood normal  : {MICHAEL EXAM MALE GENITAL:971432}    Outside and Past Medical records:  Review of the result(s) of each unique test - PSA     ASSESSMENT and PLAN  63 year old male with Mariano 5+4=9 prostate cancer, pT3bN0, with +SVI, +EPE, but negative margins. RALP 1/18/2019.  Noted to have PSA rise at last visit to 0.08, and given high-risk features at prostatectomy, completed salvage radiotherapy with short-course hormones in 2020.     Sildenafil renewed today. Having some headaches and will switch to tadalafil if persists over next few months. Will continue with sildenafil.   Will renew efforts with his Kegels. Consider return to pelvic floor PT if not improving.     - Follow-up 6 months with PSA    *** minutes spent on the date of the encounter doing chart review, history and exam, documentation and further activities as noted above.    Alan Sin MD  Urology  [facility]        Again, thank you for allowing me to participate in the care of your patient.      Sincerely,    Alan Sin MD    "

## 2023-10-19 NOTE — NURSING NOTE
Chief Complaint   Patient presents with    Prostate Cancer     Follow up and also psa draw today     Talk about the psa today, everything is going well pr patient  Horace Chahal, clinic assistant

## 2023-10-19 NOTE — PROGRESS NOTES
"  CHIEF COMPLAINT   It was my pleasure to see Brice Trevino who is a 63 year old male for follow-up of Prostate Cancer.      HPI  Brice Trevino is a very pleasant 63 year old male who presents with a history of Prostate Cancer.  RALP completed 1/18/2019. [place] 5+4, pT3b with negative lymph nodes. Negative margins. SVI and EPE present. Intraductal carcinoma noted.      PSA had elevated to 0.08. Eligard given 11/3/2020. Completed salvage radiotherapy 1/2021. PSA undetectable today. No longer on ADT.     Good return of continence, but still wears pad for safety. Improving with Kegels.     PSA   10/19/2023 - <0.04  4/20/2023 - <0.04  10/20/2022 - <0.04  4/28/2022 - <0.04  10/22/2021 - <0.04 (Testosterone 269)  4/30/2021 - <0.04  1/292021 - <0.04  10/23/2020 - 0.08  6/9/2020 - <0.04  3/13/2020 - <0.04  12/13/2019 - <0.04  9/9/2019 - <0.04  6/10/2019 - <0.04  3/11/2019 - <0.04     RALP 1/18/2019  Tumor        Histologic Type:   Adenocarcinoma (acinar, not otherwise specified)        Mariano Pattern:             Primary Pattern:   Grade 5 (60%)             Secondary Pattern:   Grade 4 (40%).             Total Mariano Score:   9 (Grade group: 5)   Margins:   Margins uninvolved by invasive carcinoma   Pathologic Staging (pTNM)        Primary Tumor (pT):    pT3b:  Seminal vesicle invasion        Regional Lymph Nodes (pN):   pN0: No regional lymph node metastasis             Number of Lymph Nodes Examined:   8             Number of Lymph Nodes Involved:     0        Distant Metastasis (pM):    pM N/A     Additional Pathologic Findings:  Intraductal carcinoma present.     PHYSICAL EXAM  Patient is a 63 year old  male   Vitals: Blood pressure 109/73, pulse 77, height 1.727 m (5' 8\"), weight 91.6 kg (202 lb), SpO2 95%.  General Appearance Adult: Body mass index is 30.71 kg/m .  Alert, no acute distress, oriented  Lungs: no respiratory distress, or pursed lip breathing  Abdomen: soft, nontender, no organomegaly or " masses  Back: no CVAT  Neuro: Alert, oriented, speech and mentation normal  Psych: affect and mood normal    Outside and Past Medical records:  Review of the result(s) of each unique test - PSA     ASSESSMENT and PLAN  63 year old male with Mariano 5+4=9 prostate cancer, pT3bN0, with +SVI, +EPE, but negative margins. RALP 1/18/2019.  Noted to have PSA rise to 0.08, and given high-risk features at prostatectomy, completed salvage radiotherapy with short-course hormones in 2020.     Sildenafil renewed today. Having some headaches and will switch to tadalafil if persists over next few months. Will continue with sildenafil.   Will renew efforts with his Kegels. Consider return to pelvic floor PT if not improving.     - Follow-up 6 months with PSA    15 minutes spent on the date of the encounter doing chart review, history and exam, documentation and further activities as noted above.    Alan Sin MD  Urology  [facility]

## 2023-11-20 ENCOUNTER — MYC REFILL (OUTPATIENT)
Dept: FAMILY MEDICINE | Facility: CLINIC | Age: 63
End: 2023-11-20
Payer: COMMERCIAL

## 2023-11-20 DIAGNOSIS — R93.1 ELEVATED CORONARY ARTERY CALCIUM SCORE: ICD-10-CM

## 2023-11-21 RX ORDER — ATORVASTATIN CALCIUM 20 MG/1
20 TABLET, FILM COATED ORAL DAILY
Qty: 90 TABLET | Refills: 0 | Status: SHIPPED | OUTPATIENT
Start: 2023-11-21 | End: 2023-12-05

## 2023-12-05 ENCOUNTER — OFFICE VISIT (OUTPATIENT)
Dept: FAMILY MEDICINE | Facility: CLINIC | Age: 63
End: 2023-12-05
Payer: COMMERCIAL

## 2023-12-05 VITALS
OXYGEN SATURATION: 95 % | RESPIRATION RATE: 16 BRPM | SYSTOLIC BLOOD PRESSURE: 118 MMHG | TEMPERATURE: 97.7 F | HEART RATE: 96 BPM | DIASTOLIC BLOOD PRESSURE: 71 MMHG | HEIGHT: 68 IN | BODY MASS INDEX: 31.67 KG/M2 | WEIGHT: 209 LBS

## 2023-12-05 DIAGNOSIS — K21.9 GASTROESOPHAGEAL REFLUX DISEASE, UNSPECIFIED WHETHER ESOPHAGITIS PRESENT: ICD-10-CM

## 2023-12-05 DIAGNOSIS — Z00.00 ENCOUNTER FOR ANNUAL PHYSICAL EXAM: Primary | ICD-10-CM

## 2023-12-05 DIAGNOSIS — Z82.49 FAMILY HISTORY OF CEREBRAL ANEURYSM: ICD-10-CM

## 2023-12-05 DIAGNOSIS — R93.1 ELEVATED CORONARY ARTERY CALCIUM SCORE: ICD-10-CM

## 2023-12-05 DIAGNOSIS — R12 HEARTBURN: ICD-10-CM

## 2023-12-05 DIAGNOSIS — R73.9 BLOOD GLUCOSE ELEVATED: ICD-10-CM

## 2023-12-05 DIAGNOSIS — C61 PROSTATE CANCER (H): ICD-10-CM

## 2023-12-05 LAB
ALBUMIN SERPL BCG-MCNC: 4.4 G/DL (ref 3.5–5.2)
ALP SERPL-CCNC: 77 U/L (ref 40–150)
ALT SERPL W P-5'-P-CCNC: 44 U/L (ref 0–70)
ANION GAP SERPL CALCULATED.3IONS-SCNC: 9 MMOL/L (ref 7–15)
AST SERPL W P-5'-P-CCNC: 31 U/L (ref 0–45)
BILIRUB SERPL-MCNC: 0.4 MG/DL
BUN SERPL-MCNC: 15.3 MG/DL (ref 8–23)
CALCIUM SERPL-MCNC: 9.8 MG/DL (ref 8.8–10.2)
CHLORIDE SERPL-SCNC: 102 MMOL/L (ref 98–107)
CHOLEST SERPL-MCNC: 165 MG/DL
CREAT SERPL-MCNC: 0.96 MG/DL (ref 0.67–1.17)
DEPRECATED HCO3 PLAS-SCNC: 30 MMOL/L (ref 22–29)
EGFRCR SERPLBLD CKD-EPI 2021: 89 ML/MIN/1.73M2
ERYTHROCYTE [DISTWIDTH] IN BLOOD BY AUTOMATED COUNT: 12.4 % (ref 10–15)
GLUCOSE SERPL-MCNC: 215 MG/DL (ref 70–99)
HBA1C MFR BLD: 6.7 % (ref 0–5.6)
HCT VFR BLD AUTO: 43.9 % (ref 40–53)
HDLC SERPL-MCNC: 56 MG/DL
HGB BLD-MCNC: 14.4 G/DL (ref 13.3–17.7)
LDLC SERPL CALC-MCNC: 72 MG/DL
MCH RBC QN AUTO: 31.3 PG (ref 26.5–33)
MCHC RBC AUTO-ENTMCNC: 32.8 G/DL (ref 31.5–36.5)
MCV RBC AUTO: 95 FL (ref 78–100)
NONHDLC SERPL-MCNC: 109 MG/DL
PLATELET # BLD AUTO: 208 10E3/UL (ref 150–450)
POTASSIUM SERPL-SCNC: 3.8 MMOL/L (ref 3.4–5.3)
PROT SERPL-MCNC: 7 G/DL (ref 6.4–8.3)
RBC # BLD AUTO: 4.6 10E6/UL (ref 4.4–5.9)
SODIUM SERPL-SCNC: 141 MMOL/L (ref 135–145)
TRIGL SERPL-MCNC: 187 MG/DL
WBC # BLD AUTO: 4.8 10E3/UL (ref 4–11)

## 2023-12-05 PROCEDURE — 80053 COMPREHEN METABOLIC PANEL: CPT | Performed by: INTERNAL MEDICINE

## 2023-12-05 PROCEDURE — 36415 COLL VENOUS BLD VENIPUNCTURE: CPT | Performed by: INTERNAL MEDICINE

## 2023-12-05 PROCEDURE — 99396 PREV VISIT EST AGE 40-64: CPT | Performed by: INTERNAL MEDICINE

## 2023-12-05 PROCEDURE — 99213 OFFICE O/P EST LOW 20 MIN: CPT | Mod: 25 | Performed by: INTERNAL MEDICINE

## 2023-12-05 PROCEDURE — 80061 LIPID PANEL: CPT | Performed by: INTERNAL MEDICINE

## 2023-12-05 PROCEDURE — 85027 COMPLETE CBC AUTOMATED: CPT | Performed by: INTERNAL MEDICINE

## 2023-12-05 PROCEDURE — 83036 HEMOGLOBIN GLYCOSYLATED A1C: CPT | Performed by: INTERNAL MEDICINE

## 2023-12-05 RX ORDER — ATORVASTATIN CALCIUM 20 MG/1
20 TABLET, FILM COATED ORAL DAILY
Qty: 90 TABLET | Refills: 3 | Status: SHIPPED | OUTPATIENT
Start: 2023-12-05 | End: 2024-09-13

## 2023-12-05 RX ORDER — FAMOTIDINE 40 MG/1
TABLET, FILM COATED ORAL
Qty: 90 TABLET | Refills: 3 | Status: SHIPPED | OUTPATIENT
Start: 2023-12-05 | End: 2024-09-13

## 2023-12-05 ASSESSMENT — PAIN SCALES - GENERAL: PAINLEVEL: NO PAIN (0)

## 2023-12-05 NOTE — RESULT ENCOUNTER NOTE
It was a pleasure seeing you for your physical examination.  I wanted to get back to you with your test results.  I have enclosed a copy for your review.      I am happy to report that your cbc or complete blood count is normal with no signs of anemia, leukemia or platelet abnormalities.  Your chemistry panel shows the elevated blood sugar and also elevated hemoglobin A1c.  The latter is just slightly high than before.  Please work on diet and exercise, diet and weight loss to improve this.  Your blood salts, kidney tests, liver tests, and proteins are all fine.    Your total cholesterol is 165 with the normal range being below 200.  Your HDL or good cholesterol is 56 with the normal range being above 40.  Your LDL or bad cholesterol is 72 with a normal range below 130.  Overall these numbers are very good    Overall the tests look fine but there is room for improvement.  Please let me know if you have questions.     Sai Benedict M.D.

## 2023-12-05 NOTE — PATIENT INSTRUCTIONS
I would recommend getting the new shingles shot called shingrix, but I would do it at your pharmacy as they can check with the insurance company to see if it is paid for.    I would also get the flu shot and covid booster at your pharmacy    Sai Benedict M.D.

## 2023-12-05 NOTE — LETTER
[place] [id number] [address] [facility]  [address]  [place]  [address]  Tel: [phone]    December 5, 2023    Brice Trevino  [address]   [facility] [address]        Dear Mr. Trevino,    It was a pleasure seeing you for your physical examination.  I wanted to get back to you with your test results.  I have enclosed a copy for your review.      I am happy to report that your cbc or complete blood count is normal with no signs of anemia, leukemia or platelet abnormalities.  Your chemistry panel shows the elevated blood sugar and also elevated hemoglobin A1c.  The latter is just slightly high than before.  Please work on diet and exercise, diet and weight loss to improve this.  Your blood salts, kidney tests, liver tests, and proteins are all fine.     Your total cholesterol is 165 with the normal range being below 200.  Your HDL or good cholesterol is 56 with the normal range being above 40.  Your LDL or bad cholesterol is 72 with a normal range below 130.  Overall these numbers are very good     Overall the tests look fine but there is room for improvement.  Please let me know if you have questions.      Sai Benedict M.D./L      Enclosure: Lab Results  Results for orders placed or performed in visit on 12/05/23   CBC with platelets     Status: Normal   Result Value Ref Range    WBC Count 4.8 4.0 - 11.0 10e3/uL    RBC Count 4.60 4.40 - 5.90 10e6/uL    Hemoglobin 14.4 13.3 - 17.7 g/dL    Hematocrit 43.9 40.0 - 53.0 %    MCV 95 78 - 100 fL    MCH 31.3 26.5 - 33.0 pg    MCHC 32.8 31.5 - 36.5 g/dL    RDW 12.4 10.0 - 15.0 %    Platelet Count 208 150 - 450 10e3/uL   Comprehensive metabolic panel     Status: Abnormal   Result Value Ref Range    Sodium 141 135 - 145 mmol/L    Potassium 3.8 3.4 - 5.3 mmol/L    Carbon Dioxide (CO2) 30 (H) 22 - 29 mmol/L    Anion Gap 9 7 - 15 mmol/L    Urea Nitrogen 15.3 8.0 - 23.0 mg/dL    Creatinine 0.96 0.67 - 1.17 mg/dL    GFR Estimate 89 >60 mL/min/1.73m2     Calcium 9.8 8.8 - 10.2 mg/dL    Chloride 102 98 - 107 mmol/L    Glucose 215 (H) 70 - 99 mg/dL    Alkaline Phosphatase 77 40 - 150 U/L    AST 31 0 - 45 U/L    ALT 44 0 - 70 U/L    Protein Total 7.0 6.4 - 8.3 g/dL    Albumin 4.4 3.5 - 5.2 g/dL    Bilirubin Total 0.4 <=1.2 mg/dL   Lipid panel reflex to direct LDL Fasting     Status: Abnormal   Result Value Ref Range    Cholesterol 165 <200 mg/dL    Triglycerides 187 (H) <150 mg/dL    Direct Measure HDL 56 >=40 mg/dL    LDL Cholesterol Calculated 72 <=100 mg/dL    Non HDL Cholesterol 109 <130 mg/dL    Narrative    Cholesterol  Desirable:  <200 mg/dL    Triglycerides  Normal:  Less than 150 mg/dL  Borderline High:  150-199 mg/dL  High:  200-499 mg/dL  Very High:  Greater than or equal to 500 mg/dL    Direct Measure HDL  Female:  Greater than or equal to 50 mg/dL   Male:  Greater than or equal to 40 mg/dL    LDL Cholesterol  Desirable:  <100mg/dL  Above Desirable:  100-129 mg/dL   Borderline High:  130-159 mg/dL   High:  160-189 mg/dL   Very High:  >= 190 mg/dL    Non HDL Cholesterol  Desirable:  130 mg/dL  Above Desirable:  130-159 mg/dL  Borderline High:  160-189 mg/dL  High:  190-219 mg/dL  Very High:  Greater than or equal to 220 mg/dL

## 2023-12-05 NOTE — PROGRESS NOTES
This is a 63-year-old here for his annual physical.  Overall he is doing well and his heartburn is not an issue.  He has a history of elevated sugar and a family history of diabetes in his father and unfortunately his weight is up.  He is not exercising regularly.  He recently found out that his brother had a CNS aneurysm and wonders about that.  He does have a history of migraines although not recently new.  No other complaints.               Past Medical History:      Past Medical History:   Diagnosis Date    Blood glucose elevated     Dermatitis     Elevated coronary artery calcium score 11/2018    16.8    Gastroesophageal reflux disease     History of colonoscopy 07/2023    tics only    Inguinal hernia     right very small    Prostate cancer (H) 2019    robotic surgery done 1/19 by Dr. Sin, slight rise psa 10/20 had xrt and hormone therapy             Past Surgical History:      Past Surgical History:   Procedure Laterality Date    BIOPSY  12/03/2018    Biopsy of Prostate    COLONOSCOPY  04/19/2013    Procedure: COLONOSCOPY;  Colonoscopy;  Surgeon: Yovany Alcaraz MD;  Location:  GI    COLONOSCOPY N/A 7/27/2023    Procedure: Colonoscopy;  Surgeon: Cathi Robles MD;  [facility]    DAVINCI PROSTATECTOMY, LYMPHADENECTOMY Bilateral 01/18/2019    Procedure: robotic assisted laparoscopic radical prostatectomy bilateral pelvic lymphandectomy;  Surgeon: Alan Sin MD;  Location: [facility] OR    RHINOPLASTY               Social History:     Social History     Socioeconomic History    Marital status: Single     Spouse name: Not on file    Number of children: 0    Years of education: Not on file    Highest education level: Not on file   Occupational History    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 No   Social History Narrative    Dairy/d 2 servings/d.     Caffeine 2 servings/d    Exercise 5 x week    Sunscreen used - Yes    Seatbelts used - Yes    Working smoke/CO detectors in the home - Yes    Guns stored in the home - No    Self Breast Exams - No    Self Testicular Exam - No    Eye Exam up to date - Yes    Dental Exam up to date - Yes    Pap Smear up to date - NA    Mammogram up to date - NA    PSA up to date - No    Dexa Scan up to date - No    Flex Sig / Colonoscopy up to date - Yes    Immunizations up to date - No    Abuse: Current or Past(Physical, Sexual or Emotional)- No    Do you feel safe in your environment - Yes             Social Determinants of Health     Financial Resource Strain: Low Risk  (12/4/2023)    Financial Resource Strain     Within the past 12 months, have you or your family members you live with been unable to get utilities (heat, electricity) when it was really needed?: No   Food Insecurity: Low Risk  (12/4/2023)    Food Insecurity     Within the past 12 months, did you worry that your food would run out before you got money to buy more?: No     Within the past 12 months, did the food you bought just not last and you didn t have money to get more?: No   Transportation Needs: Low Risk  (12/4/2023)    Transportation Needs     Within the past 12 months, has lack of transportation kept you from medical appointments, getting your medicines, non-medical meetings or appointments, work, or from getting things that you need?: No   Physical Activity: Not on file   Stress: Not on file   Social Connections: Not on file   Interpersonal Safety: Not on file   Housing Stability: Low Risk  (12/4/2023)    Housing Stability     Do you have housing? : Yes     Are you worried about losing your housing?: No             Family History:   reviewed         Allergies:     Allergies   Allergen Reactions    No  "Known Drug Allergy              Medications:     Current Outpatient Medications   Medication Sig Dispense Refill    aspirin (ASA) 81 MG EC tablet Take 1 tablet (81 mg) by mouth daily      atorvastatin (LIPITOR) 20 MG tablet Take 1 tablet (20 mg) by mouth daily 90 tablet 3    famotidine (PEPCID) 40 MG tablet Take one pill daily but you can use one pill twice daily if needed. 90 tablet 3    fluocinonide (LIDEX) 0.05 % external cream Apply topically 2 times daily 60 g 3    sildenafil (REVATIO) 20 MG tablet Take 1 tablet (20 mg) by mouth daily as needed (sexual activity) Take 1-5 tabs, 30-60 mins prior to sexual activity. Do not take with nitrates. 30 tablet 11    terbinafine (LAMISIL) 1 % external cream DENILSON AA BID FOR FUNGAL INFECTION  1    triamcinolone (KENALOG) 0.1 % external cream Apply topically 2 times daily 454 g 3               Review of Systems:     The 10 point Review of Systems is negative other than noted in the HPI           Physical Exam:   Blood pressure 118/71, pulse 96, temperature 97.7  F (36.5  C), temperature source Temporal, resp. rate 16, height 1.727 m (5' 8\"), weight 94.8 kg (209 lb), SpO2 95%.    Exam:  Constitutional: healthy appearing, alert and in no distress  Heent: Normocephalic. Head without obvious masses or lesions. PERRLDC, EOMI. Mouth exam within normal limits: tongue, mucous membranes, posterior pharynx all normal, no lesions or abnormalities seen.  Tm's and canals within normal limits bilaterally. Neck supple, no nuchal rigidity or masses. No supraclavicular, or cervical adenopathy. Thyroid symmetric, no masses.  Cardiovascular: Regular rate and rhythm, no murmer, rub or gallops.  JVP not elevated, no edema.  Carotids within normal limits bilaterally, no bruits.  Respiratory: Normal respiratory effort.  Lungs clear, normal flow, no wheezing or crackles.  Breasts: Normal bilaterally.  No masses or lesions.  Nipples within normal limites.  No axillary lesions or " nodes.  Gastrointestinal: Normal active bowel sounds.   Soft, not tender, no masses, guarding or rebound.  No hepatosplenomegaly.   Genitourinary: Rectal not done  Musculoskeletal: extremities normal, no gross deformities noted.  Skin: no suspicious lesions or rashes   Neurologic: Mental status within normal limits.  Speech fluent.  No gross motor abnormalities and gait intact.  Psychiatric: mentation appears normal and affect normal.         Data:   Labs sent        Assessment:   Normal complete physical exam  Cap, melissa, has reg iurol follow up  Gerd, no issues  Elevated sugar, exercise, diet and weight loss  Positive coronary calcium score, med mgmt  Family history aneurysm  hcm         Plan:   Immunizations at pharm  Mri brain/mra  Exercise, diet and weight loss  Up to date colon  Letter with labs      Sai Benedict M.D.    ,

## 2024-04-19 ENCOUNTER — OFFICE VISIT (OUTPATIENT)
Dept: UROLOGY | Facility: CLINIC | Age: 64
End: 2024-04-19
Payer: COMMERCIAL

## 2024-04-19 VITALS
HEIGHT: 68 IN | HEART RATE: 84 BPM | BODY MASS INDEX: 31.67 KG/M2 | OXYGEN SATURATION: 97 % | WEIGHT: 209 LBS | SYSTOLIC BLOOD PRESSURE: 107 MMHG | DIASTOLIC BLOOD PRESSURE: 71 MMHG

## 2024-04-19 DIAGNOSIS — C61 PROSTATE CANCER (H): ICD-10-CM

## 2024-04-19 DIAGNOSIS — N52.9 ERECTILE DYSFUNCTION, UNSPECIFIED ERECTILE DYSFUNCTION TYPE: Primary | ICD-10-CM

## 2024-04-19 LAB — PSA SERPL-MCNC: <0.04 UG/L (ref 0–4)

## 2024-04-19 PROCEDURE — 36415 COLL VENOUS BLD VENIPUNCTURE: CPT | Performed by: NURSE PRACTITIONER

## 2024-04-19 PROCEDURE — 84153 ASSAY OF PSA TOTAL: CPT | Performed by: NURSE PRACTITIONER

## 2024-04-19 PROCEDURE — 99213 OFFICE O/P EST LOW 20 MIN: CPT | Performed by: NURSE PRACTITIONER

## 2024-04-19 ASSESSMENT — PAIN SCALES - GENERAL: PAINLEVEL: NO PAIN (0)

## 2024-04-19 NOTE — PROGRESS NOTES
Urology Outpatient Visit    Name: Brice Trevino    MRN: [id number]   YOB: 1960               Chief Complaint:   Hx Prostate Cancer          Impression and Plan:   Impression / Plan:   Brice Tervino is a 63 year old male with Hx Pr Ca [place] 5+4, pT3b w SVI & EPE s/p RALP 2019. Here for PSA monitoring & ED.       PSA remains undetectable.    Plan for follow-up PSA in 6 mo.    Will continue on sildenafil for ED.    Provided with pelvic floor exercises. If he wishes to pursue a formal pelvic floor PT consult he will reach out to our office and let us know.    Thank you for the opportunity to participate in the care of Brice Trevino.     LENARD Webb, CNP  [facility] - Department of Urology  [phone]          History of Present Illness:   Brice Trevino is a 63 year old male with history of Prostate Cancer.  RALP completed 1/18/2019. [place] 5+4, pT3b with negative lymph nodes. Negative margins. SVI and EPE present. Intraductal carcinoma noted. PSA bety to 0.08 10/23/20 and given high-risk features at prostatectomy he was given short-course of hormones in 2020 and completed salvage radiotherapy 1/2021. PSA undetectable today. No longer on ADT. Has been having good success with sildenafil 40 mg, he plans to try 60 mg. Reports since radiation has had more leakage.     He has no complaints today.     History is obtained from patient & EMR          Past Medical History:     Past Medical History:   Diagnosis Date    Blood glucose elevated     Dermatitis     Elevated coronary artery calcium score 11/2018    16.8    Gastroesophageal reflux disease     History of colonoscopy 07/2023    tics only    Inguinal hernia     right very small    Prostate cancer (H) 2019    robotic surgery done 1/19 by Dr. Sin, slight rise psa 10/20 had xrt and hormone therapy          Past Surgical History:     Past Surgical History:   Procedure Laterality Date    BIOPSY  12/03/2018    Biopsy of Prostate     COLONOSCOPY  04/19/2013    Procedure: COLONOSCOPY;  Colonoscopy;  Surgeon: Yovany Alcaraz MD;  Location:  GI    COLONOSCOPY N/A 7/27/2023    Procedure: Colonoscopy;  Surgeon: Cathi Robles MD;  Location:  GI    DAVINCI PROSTATECTOMY, LYMPHADENECTOMY Bilateral 01/18/2019    Procedure: robotic assisted laparoscopic radical prostatectomy bilateral pelvic lymphandectomy;  Surgeon: Alan Sin MD;  Location: [facility] OR    RHINOPLASTY            Social History:     Social History     Tobacco Use    Smoking status: Never    Smokeless tobacco: Never   Substance Use Topics    Alcohol use: No          Family History:     Family History   Problem Relation Age of Onset    Gastrointestinal Disease Mother         reflux    Early Death Mother     Diabetes Father         type 2    Prostate Cancer Father     Aneurysm Brother     Eye Disorder Maternal Grandfather         cateracts    Heart Disease Paternal Grandfather         heart attack age 60's    Melanoma No family hx of     Skin Cancer No family hx of           Allergies:     Allergies   Allergen Reactions    No Known Drug Allergy           Medications:     Current Outpatient Medications   Medication Sig Dispense Refill    aspirin (ASA) 81 MG EC tablet Take 1 tablet (81 mg) by mouth daily      atorvastatin (LIPITOR) 20 MG tablet Take 1 tablet (20 mg) by mouth daily 90 tablet 3    famotidine (PEPCID) 40 MG tablet Take one pill daily but you can use one pill twice daily if needed. 90 tablet 3    fluocinonide (LIDEX) 0.05 % external cream Apply topically 2 times daily 60 g 3    sildenafil (REVATIO) 20 MG tablet Take 1 tablet (20 mg) by mouth daily as needed (sexual activity) Take 1-5 tabs, 30-60 mins prior to sexual activity. Do not take with nitrates. 30 tablet 11    terbinafine (LAMISIL) 1 % external cream DENILSON AA BID FOR FUNGAL INFECTION  1    triamcinolone (KENALOG) 0.1 % external cream Apply topically 2 times daily 454 g 3     No current  facility-administered medications for this visit.          Review of Systems:    ROS: See HPI for pertinent details.  Remainder of 10-point ROS negative.         Physical Exam:   VS:  T: Data Unavailable    HR: 84    BP: 107/71    RR: Data Unavailable     GEN:  Alert.  NAD.   HEENT:  Sclerae anicteric.    CV:  No obvious jugular venous distension.  LUNGS: No respiratory distress, breathing comfortably wo accessory muscle use.  ABD:  ND.     SKIN:  Dry. No visible rashes.   NEURO:  CN grossly intact.          Laboratory Data:     Lab Results   Component Value Date    PSA <0.04 10/19/2023    PSA <0.04 04/20/2023    PSA <0.04 10/20/2022    PSA <0.04 04/28/2022    PSA <0.04 10/22/2021    PSA 6.24 01/11/2019    PSA 4.52 11/09/2018    PSA 3.57 05/04/2018    PSA 1.12 02/09/2016    PSA 0.66 09/26/2014     Lab Results   Component Value Date    CR 0.96 12/05/2023    CR 0.97 12/09/2022    CR 0.96 06/10/2021    CR 0.84 01/19/2019    CR 0.75 01/18/2019    CR 0.93 05/04/2018     Lab Results   Component Value Date    GFRESTIMATED 89 12/05/2023    GFRESTIMATED 88 12/09/2022    GFRESTIMATED 85 06/10/2021    GFRESTIMATED >90 01/19/2019    GFRESTIMATED >90 01/18/2019    GFRESTIMATED 84 05/04/2018

## 2024-04-19 NOTE — LETTER
4/19/2024       RE: Brice Trevino  [address]  [facility] [address]     Dear Colleague,    Thank you for referring your patient, Brice Trevino, to the [facility]OLOG[facility] at [facility]. Please see a copy of my visit note below.      Urology Outpatient Visit    Name: Brice Trevino    MRN: [id number]   YOB: 1960               Chief Complaint:   Hx Prostate Cancer          Impression and Plan:   Impression / Plan:   Brice Trevino is a 63 year old male with Hx Pr Ca [place] 5+4, pT3b w SVI & EPE s/p RALP 2019. Here for PSA monitoring & ED.       PSA remains undetectable.    Plan for follow-up PSA in 6 mo.    Will continue on sildenafil for ED.    Provided with pelvic floor exercises. If he wishes to pursue a formal pelvic floor PT consult he will reach out to our office and let us know.    Thank you for the opportunity to participate in the care of Brice Trevino.     LENARD Webb, CNP   [facility] - Department of Urology  [phone]          History of Present Illness:   Brice Trevino is a 63 year old male with history of Prostate Cancer.  RALP completed 1/18/2019. [place] 5+4, pT3b with negative lymph nodes. Negative margins. SVI and EPE present. Intraductal carcinoma noted. PSA bety to 0.08 10/23/20 and given high-risk features at prostatectomy he was given short-course of hormones in 2020 and completed salvage radiotherapy 1/2021. PSA undetectable today. No longer on ADT. Has been having good success with sildenafil 40 mg, he plans to try 60 mg. Reports since radiation has had more leakage.     He has no complaints today.     History is obtained from patient & EMR          Past Medical History:     Past Medical History:   Diagnosis Date    Blood glucose elevated     Dermatitis     Elevated coronary artery calcium score 11/2018    16.8    Gastroesophageal reflux disease     History of colonoscopy  07/2023    tics only    Inguinal hernia     right very small    Prostate cancer (H) 2019    robotic surgery done 1/19 by Dr. Sin, slight rise psa 10/20 had xrt and hormone therapy          Past Surgical History:     Past Surgical History:   Procedure Laterality Date    BIOPSY  12/03/2018    Biopsy of Prostate    COLONOSCOPY  04/19/2013    Procedure: COLONOSCOPY;  Colonoscopy;  Surgeon: Yovany Alcaraz MD;  Location:  GI    COLONOSCOPY N/A 7/27/2023    Procedure: Colonoscopy;  Surgeon: Cathi Robles MD;  Location:  GI    DAVINCI PROSTATECTOMY, LYMPHADENECTOMY Bilateral 01/18/2019    Procedure: robotic assisted laparoscopic radical prostatectomy bilateral pelvic lymphandectomy;  Surgeon: Alan Sin MD;  Location: [facility]    RHINOPLASTY            Social History:     Social History     Tobacco Use    Smoking status: Never    Smokeless tobacco: Never   Substance Use Topics    Alcohol use: No          Family History:     Family History   Problem Relation Age of Onset    Gastrointestinal Disease Mother         reflux    Early Death Mother     Diabetes Father         type 2    Prostate Cancer Father     Aneurysm Brother     Eye Disorder Maternal Grandfather         cateracts    Heart Disease Paternal Grandfather         heart attack age 60's    Melanoma No family hx of     Skin Cancer No family hx of           Allergies:     Allergies   Allergen Reactions    No Known Drug Allergy           Medications:     Current Outpatient Medications   Medication Sig Dispense Refill    aspirin (ASA) 81 MG EC tablet Take 1 tablet (81 mg) by mouth daily      atorvastatin (LIPITOR) 20 MG tablet Take 1 tablet (20 mg) by mouth daily 90 tablet 3    famotidine (PEPCID) 40 MG tablet Take one pill daily but you can use one pill twice daily if needed. 90 tablet 3    fluocinonide (LIDEX) 0.05 % external cream Apply topically 2 times daily 60 g 3    sildenafil (REVATIO) 20 MG tablet Take 1 tablet (20 mg) by mouth  daily as needed (sexual activity) Take 1-5 tabs, 30-60 mins prior to sexual activity. Do not take with nitrates. 30 tablet 11    terbinafine (LAMISIL) 1 % external cream DENILSON AA BID FOR FUNGAL INFECTION  1    triamcinolone (KENALOG) 0.1 % external cream Apply topically 2 times daily 454 g 3     No current facility-administered medications for this visit.          Review of Systems:    ROS: See HPI for pertinent details.  Remainder of 10-point ROS negative.         Physical Exam:   VS:  T: Data Unavailable    HR: 84    BP: 107/71    RR: Data Unavailable     GEN:  Alert.  NAD.   HEENT:  Sclerae anicteric.    CV:  No obvious jugular venous distension.  LUNGS: No respiratory distress, breathing comfortably wo accessory muscle use.  ABD:  ND.     SKIN:  Dry. No visible rashes.   NEURO:  CN grossly intact.          Laboratory Data:     Lab Results   Component Value Date    PSA <0.04 10/19/2023    PSA <0.04 04/20/2023    PSA <0.04 10/20/2022    PSA <0.04 04/28/2022    PSA <0.04 10/22/2021    PSA 6.24 01/11/2019    PSA 4.52 11/09/2018    PSA 3.57 05/04/2018    PSA 1.12 02/09/2016    PSA 0.66 09/26/2014     Lab Results   Component Value Date    CR 0.96 12/05/2023    CR 0.97 12/09/2022    CR 0.96 06/10/2021    CR 0.84 01/19/2019    CR 0.75 01/18/2019    CR 0.93 05/04/2018     Lab Results   Component Value Date    GFRESTIMATED 89 12/05/2023    GFRESTIMATED 88 12/09/2022    GFRESTIMATED 85 06/10/2021    GFRESTIMATED >90 01/19/2019    GFRESTIMATED >90 01/18/2019    GFRESTIMATED 84 05/04/2018

## 2024-04-19 NOTE — NURSING NOTE
Chief Complaint   Patient presents with    hx prostate cancer     Here in clinic for a psa draw today     Talk about the psa today   Everything is going good Horace Chahal, CMA

## 2024-09-13 ENCOUNTER — OFFICE VISIT (OUTPATIENT)
Dept: FAMILY MEDICINE | Facility: CLINIC | Age: 64
End: 2024-09-13
Payer: COMMERCIAL

## 2024-09-13 VITALS
OXYGEN SATURATION: 96 % | WEIGHT: 205 LBS | SYSTOLIC BLOOD PRESSURE: 116 MMHG | HEIGHT: 68 IN | BODY MASS INDEX: 31.07 KG/M2 | RESPIRATION RATE: 16 BRPM | TEMPERATURE: 98.6 F | HEART RATE: 69 BPM | DIASTOLIC BLOOD PRESSURE: 78 MMHG

## 2024-09-13 DIAGNOSIS — R12 HEARTBURN: ICD-10-CM

## 2024-09-13 DIAGNOSIS — E66.811 CLASS 1 OBESITY WITH SERIOUS COMORBIDITY AND BODY MASS INDEX (BMI) OF 31.0 TO 31.9 IN ADULT, UNSPECIFIED OBESITY TYPE: ICD-10-CM

## 2024-09-13 DIAGNOSIS — R93.1 ELEVATED CORONARY ARTERY CALCIUM SCORE: ICD-10-CM

## 2024-09-13 DIAGNOSIS — C61 PROSTATE CANCER (H): ICD-10-CM

## 2024-09-13 DIAGNOSIS — R73.9 BLOOD GLUCOSE ELEVATED: Primary | ICD-10-CM

## 2024-09-13 DIAGNOSIS — Z23 NEED FOR PROPHYLACTIC VACCINATION AND INOCULATION AGAINST INFLUENZA: ICD-10-CM

## 2024-09-13 LAB
CHOLEST SERPL-MCNC: 154 MG/DL
CREAT UR-MCNC: 158 MG/DL
FASTING STATUS PATIENT QL REPORTED: NO
HBA1C MFR BLD: 6.7 % (ref 0–5.6)
HDLC SERPL-MCNC: 49 MG/DL
LDLC SERPL CALC-MCNC: 85 MG/DL
MICROALBUMIN UR-MCNC: <12 MG/L
MICROALBUMIN/CREAT UR: NORMAL MG/G{CREAT}
NONHDLC SERPL-MCNC: 105 MG/DL
TRIGL SERPL-MCNC: 98 MG/DL

## 2024-09-13 PROCEDURE — 83036 HEMOGLOBIN GLYCOSYLATED A1C: CPT | Performed by: INTERNAL MEDICINE

## 2024-09-13 PROCEDURE — 82570 ASSAY OF URINE CREATININE: CPT | Performed by: INTERNAL MEDICINE

## 2024-09-13 PROCEDURE — 99214 OFFICE O/P EST MOD 30 MIN: CPT | Mod: 25 | Performed by: INTERNAL MEDICINE

## 2024-09-13 PROCEDURE — 90471 IMMUNIZATION ADMIN: CPT | Performed by: INTERNAL MEDICINE

## 2024-09-13 PROCEDURE — 90673 RIV3 VACCINE NO PRESERV IM: CPT | Performed by: INTERNAL MEDICINE

## 2024-09-13 PROCEDURE — 36415 COLL VENOUS BLD VENIPUNCTURE: CPT | Performed by: INTERNAL MEDICINE

## 2024-09-13 PROCEDURE — 82043 UR ALBUMIN QUANTITATIVE: CPT | Performed by: INTERNAL MEDICINE

## 2024-09-13 PROCEDURE — 80061 LIPID PANEL: CPT | Performed by: INTERNAL MEDICINE

## 2024-09-13 RX ORDER — FAMOTIDINE 40 MG/1
TABLET, FILM COATED ORAL
Qty: 90 TABLET | Refills: 3 | Status: SHIPPED | OUTPATIENT
Start: 2024-09-13

## 2024-09-13 RX ORDER — ATORVASTATIN CALCIUM 20 MG/1
20 TABLET, FILM COATED ORAL DAILY
Qty: 90 TABLET | Refills: 3 | Status: SHIPPED | OUTPATIENT
Start: 2024-09-13

## 2024-09-13 ASSESSMENT — PAIN SCALES - GENERAL: PAINLEVEL: NO PAIN (0)

## 2024-09-13 NOTE — PROGRESS NOTES
This is a 64-year-old who I am seeing for follow-up to multiple issues.    The patient has a history of elevated blood sugar with his A1c done in December of 6.7.  His weight remains an issue.  We discussed the need for diet and exercise as well as weight loss.    The patient has a history of prostate cancer which is being monitored by urology.    The patient has a history of an elevated coronary calcium score for which she is on statin therapy.  His triglycerides were high last time.    With respect to his weight we discussed exercise and diet.    His heartburn is controlled on therapy.    Past Medical History:   Diagnosis Date    Blood glucose elevated     Dermatitis     Elevated coronary artery calcium score 11/2018    16.8    Gastroesophageal reflux disease     History of colonoscopy 07/2023    tics only    Inguinal hernia     right very small    Prostate cancer (H) 2019    robotic surgery done 1/19 by Dr. Sin, slight rise psa 10/20 had xrt and hormone therapy     Past Surgical History:   Procedure Laterality Date    BIOPSY  12/03/2018    Biopsy of Prostate    COLONOSCOPY  04/19/2013    Procedure: COLONOSCOPY;  Colonoscopy;  Surgeon: Yovany Alcaraz MD;  Location: [facility]    COLONOSCOPY N/A 7/27/2023    Procedure: Colonoscopy;  Surgeon: Cathi Robles MD;  Location: [facility]    DAVINCI PROSTATECTOMY, LYMPHADENECTOMY Bilateral 01/18/2019    Procedure: robotic assisted laparoscopic radical prostatectomy bilateral pelvic lymphandectomy;  Surgeon: Alan Sin MD;  Location: [facility]    RHINOPLASTY       Social History     Socioeconomic History    Marital status: Single     Spouse name: Not on file    Number of children: 0    Years of education: Not on file    Highest education level: Not on file   Occupational History    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 No   Social History Narrative    Dairy/d 2 servings/d.     Caffeine 2 servings/d    Exercise 5 x week    Sunscreen used - Yes    Seatbelts used - Yes    Working smoke/CO detectors in the home - Yes    Guns stored in the home - No    Self Breast Exams - No    Self Testicular Exam - No    Eye Exam up to date - Yes    Dental Exam up to date - Yes    Pap Smear up to date - NA    Mammogram up to date - NA    PSA up to date - No    Dexa Scan up to date - No    Flex Sig / Colonoscopy up to date - Yes    Immunizations up to date - No    Abuse: Current or Past(Physical, Sexual or Emotional)- No    Do you feel safe in your environment - Yes             Social Determinants of Health     Financial Resource Strain: Low Risk  (12/4/2023)    Financial Resource Strain     Within the past 12 months, have you or your family members you live with been unable to get utilities (heat, electricity) when it was really needed?: No   Food Insecurity: Low Risk  (12/4/2023)    Food Insecurity     Within the past 12 months, did you worry that your food would run out before you got money to buy more?: No     Within the past 12 months, did the food you bought just not last and you didn t have money to get more?: No   Transportation Needs: Low Risk  (12/4/2023)    Transportation Needs     Within the past 12 months, has lack of transportation kept you from medical appointments, getting your medicines, non-medical meetings or appointments, work, or from getting things that you need?: No   Physical Activity: Not on file   Stress: Not on file   Social Connections: Not on file   Interpersonal Safety: Not on file   Housing Stability: Low Risk  (12/4/2023)    Housing Stability     Do you have housing? : Yes     Are you worried about losing your housing?: No     Current Outpatient Medications  "  Medication Sig Dispense Refill    aspirin (ASA) 81 MG EC tablet Take 1 tablet (81 mg) by mouth daily      atorvastatin (LIPITOR) 20 MG tablet Take 1 tablet (20 mg) by mouth daily. 90 tablet 3    famotidine (PEPCID) 40 MG tablet Take one pill daily but you can use one pill twice daily if needed. 90 tablet 3    fluocinonide (LIDEX) 0.05 % external cream Apply topically 2 times daily 60 g 3    sildenafil (REVATIO) 20 MG tablet Take 1 tablet (20 mg) by mouth daily as needed (sexual activity) Take 1-5 tabs, 30-60 mins prior to sexual activity. Do not take with nitrates. 30 tablet 11    terbinafine (LAMISIL) 1 % external cream DENILSON AA BID FOR FUNGAL INFECTION  1    triamcinolone (KENALOG) 0.1 % external cream Apply topically 2 times daily 454 g 3     Allergies   Allergen Reactions    No Known Drug Allergy      FAMILY HISTORY NOTED AND REVIEWED    REVIEW OF SYSTEMS: above    PHYSICAL EXAM    /78 (BP Location: Right arm, Patient Position: Sitting, Cuff Size: Adult Large)   Pulse 69   Temp 98.6  F (37  C)   Resp 16   Ht 1.727 m (5' 8\")   Wt 93 kg (205 lb)   SpO2 96%   BMI 31.17 kg/m      Patient appears non toxic  Lungs clear  Cv reglar rate and rhythm    Labs sent    ASSESSMENT:  Elevated sugar, last A1c in the diabetic range.  I discussed the need for exercise, diet and weight loss.  Labs today  Prostate cancer, no evidence of disease, follow-up urology  Elevated coronary calcium score, med therapy  Obesity, exercise, diet and weight loss  Heartburn, controlled    PLAN:  Labs  COVID and flu shot  Shingrix at pharmacy  Exercise, diet and weight loss  Physical in 6 months    Sai Benedict M.D.      "

## 2024-10-23 ENCOUNTER — OFFICE VISIT (OUTPATIENT)
Dept: UROLOGY | Facility: CLINIC | Age: 64
End: 2024-10-23
Payer: COMMERCIAL

## 2024-10-23 VITALS — HEART RATE: 78 BPM | DIASTOLIC BLOOD PRESSURE: 72 MMHG | OXYGEN SATURATION: 96 % | SYSTOLIC BLOOD PRESSURE: 113 MMHG

## 2024-10-23 DIAGNOSIS — C61 PROSTATE CANCER (H): Primary | ICD-10-CM

## 2024-10-23 LAB — PSA SERPL-MCNC: <0.04 UG/L (ref 0–4)

## 2024-10-23 PROCEDURE — 36415 COLL VENOUS BLD VENIPUNCTURE: CPT | Performed by: NURSE PRACTITIONER

## 2024-10-23 PROCEDURE — 84153 ASSAY OF PSA TOTAL: CPT | Performed by: NURSE PRACTITIONER

## 2024-10-23 PROCEDURE — 99213 OFFICE O/P EST LOW 20 MIN: CPT | Performed by: NURSE PRACTITIONER

## 2024-10-23 NOTE — LETTER
10/23/2024       RE: Brice Trevino  [address]  [facility] [address]     Dear Colleague,    Thank you for referring your patient, Brice Trevino, to the [facility]OLOGY [facility] at [facility]. Please see a copy of my visit note below.      Urology Outpatient Visit    Name: Brice Trevino    MRN: [id number]   YOB: 1960               Chief Complaint:   Hx Prostate Cancer          Impression and Plan:   Impression / Plan:   Brice Trevino is a 64 year old male with Hx Pr Ca [place] 5+4, pT3b w SVI & EPE s/p RALP 2019. Here for PSA monitoring & ED.       PSA remains undetectable.    Plan for follow-up PSA in 6 mo.    Will continue on sildenafil for ED.    Thank you for the opportunity to participate in the care of Brice Trevino.     LENARD Webb, CNP   [facility] - Department of Urology  [phone]          History of Present Illness:   Brice Trevino is a pleasant 64 year old male with history of Prostate Cancer.  RALP completed 1/18/2019. [place] 5+4, pT3b with negative lymph nodes. Negative margins. SVI and EPE present. Intraductal carcinoma noted. PSA bety to 0.08 10/23/20 and given high-risk features at prostatectomy he was given short-course of hormones in 2020 and completed salvage radiotherapy 1/2021. PSA undetectable today. No longer on ADT. Has been having good success with sildenafil. Reports since radiation has had more leakage.      He has no complaints today.     History is obtained from patient & EMR          Past Medical History:     Past Medical History:   Diagnosis Date     Blood glucose elevated      Dermatitis      Elevated coronary artery calcium score 11/2018    16.8     Gastroesophageal reflux disease      History of colonoscopy 07/2023    tics only     Inguinal hernia     right very small     Prostate cancer (H) 2019    robotic surgery done 1/19 by Dr. Sin, slight rise psa 10/20 had  xrt and hormone therapy          Past Surgical History:     Past Surgical History:   Procedure Laterality Date     BIOPSY  12/03/2018    Biopsy of Prostate     COLONOSCOPY  04/19/2013    Procedure: COLONOSCOPY;  Colonoscopy;  Surgeon: Yovany Alcaraz MD;  Location:  GI     COLONOSCOPY N/A 7/27/2023    Procedure: Colonoscopy;  Surgeon: Cathi Robles MD;  Location:  GI     DAVINCI PROSTATECTOMY, LYMPHADENECTOMY Bilateral 01/18/2019    Procedure: robotic assisted laparoscopic radical prostatectomy bilateral pelvic lymphandectomy;  Surgeon: Alan Sin MD;  Location: [facility]     RHINOPLASTY            Social History:     Social History     Tobacco Use     Smoking status: Never     Smokeless tobacco: Never   Substance Use Topics     Alcohol use: No          Family History:     Family History   Problem Relation Age of Onset     Gastrointestinal Disease Mother         reflux     Early Death Mother      Diabetes Father         type 2     Prostate Cancer Father      Aneurysm Brother      Diabetes Type 2  Brother      Eye Disorder Maternal Grandfather         cateracts     Heart Disease Paternal Grandfather         heart attack age 60's     Melanoma No family hx of      Skin Cancer No family hx of           Allergies:     Allergies   Allergen Reactions     No Known Drug Allergy           Medications:     Current Outpatient Medications   Medication Sig Dispense Refill     aspirin (ASA) 81 MG EC tablet Take 1 tablet (81 mg) by mouth daily       atorvastatin (LIPITOR) 20 MG tablet Take 1 tablet (20 mg) by mouth daily. 90 tablet 3     famotidine (PEPCID) 40 MG tablet Take one pill daily but you can use one pill twice daily if needed. 90 tablet 3     fluocinonide (LIDEX) 0.05 % external cream Apply topically 2 times daily 60 g 3     sildenafil (REVATIO) 20 MG tablet Take 1 tablet (20 mg) by mouth daily as needed (sexual activity) Take 1-5 tabs, 30-60 mins prior to sexual activity. Do not take with  nitrates. 30 tablet 11     terbinafine (LAMISIL) 1 % external cream DENILSON AA BID FOR FUNGAL INFECTION  1     triamcinolone (KENALOG) 0.1 % external cream Apply topically 2 times daily 454 g 3     No current facility-administered medications for this visit.          Review of Systems:    ROS: See HPI for pertinent details.  Remainder of 10-point ROS negative.         Physical Exam:   VS:  T: Data Unavailable    HR: 78    BP: 113/72    RR: Data Unavailable     GEN:  Alert.  NAD.   HEENT:  Sclerae anicteric.    CV:  No obvious jugular venous distension.  LUNGS: No respiratory distress, breathing comfortably wo accessory muscle use.  ABD:  ND.     SKIN:  Dry. No visible rashes.   NEURO:  CN grossly intact.          Laboratory Data:     Lab Results   Component Value Date    PSA <0.04 04/19/2024    PSA <0.04 10/19/2023    PSA <0.04 04/20/2023    PSA <0.04 10/20/2022    PSA <0.04 04/28/2022    PSA <0.04 10/22/2021    PSA 6.24 01/11/2019    PSA 4.52 11/09/2018    PSA 3.57 05/04/2018    PSA 1.12 02/09/2016    PSA 0.66 09/26/2014     Lab Results   Component Value Date    CR 0.96 12/05/2023    CR 0.97 12/09/2022    CR 0.96 06/10/2021    CR 0.84 01/19/2019    CR 0.75 01/18/2019    CR 0.93 05/04/2018     Lab Results   Component Value Date    GFRESTIMATED 89 12/05/2023    GFRESTIMATED 88 12/09/2022    GFRESTIMATED 85 06/10/2021    GFRESTIMATED >90 01/19/2019    GFRESTIMATED >90 01/18/2019    GFRESTIMATED 84 05/04/2018            Again, thank you for allowing me to participate in the care of your patient.      Sincerely,    LENARD Ragland CNP

## 2024-10-23 NOTE — PROGRESS NOTES
Urology Outpatient Visit    Name: Brice Trevino    MRN: [id number]   YOB: 1960               Chief Complaint:   Hx Prostate Cancer          Impression and Plan:   Impression / Plan:   Brice Trevino is a 64 year old male with Hx Pr Ca [place] 5+4, pT3b w SVI & EPE s/p RALP 2019. Here for PSA monitoring & ED.       PSA remains undetectable.    Plan for follow-up PSA in 6 mo.    Will continue on sildenafil for ED.    Thank you for the opportunity to participate in the care of Brice Trevino.     LENARD Webb, CNP  [facility] - Department of Urology  [phone]          History of Present Illness:   Brice Trevino is a pleasant 64 year old male with history of Prostate Cancer.  RALP completed 1/18/2019. [place] 5+4, pT3b with negative lymph nodes. Negative margins. SVI and EPE present. Intraductal carcinoma noted. PSA bety to 0.08 10/23/20 and given high-risk features at prostatectomy he was given short-course of hormones in 2020 and completed salvage radiotherapy 1/2021. PSA undetectable today. No longer on ADT. Has been having good success with sildenafil. Reports since radiation has had more leakage.      He has no complaints today.     History is obtained from patient & EMR          Past Medical History:     Past Medical History:   Diagnosis Date    Blood glucose elevated     Dermatitis     Elevated coronary artery calcium score 11/2018    16.8    Gastroesophageal reflux disease     History of colonoscopy 07/2023    tics only    Inguinal hernia     right very small    Prostate cancer (H) 2019    robotic surgery done 1/19 by Dr. Sin, slight rise psa 10/20 had xrt and hormone therapy          Past Surgical History:     Past Surgical History:   Procedure Laterality Date    BIOPSY  12/03/2018    Biopsy of Prostate    COLONOSCOPY  04/19/2013    Procedure: COLONOSCOPY;  Colonoscopy;  Surgeon: Yovany Alcaraz MD;  Location:  GI    COLONOSCOPY N/A 7/27/2023    Procedure:  Colonoscopy;  Surgeon: Cathi Robles MD;  Location:  GI    DAVINCI PROSTATECTOMY, LYMPHADENECTOMY Bilateral 01/18/2019    Procedure: robotic assisted laparoscopic radical prostatectomy bilateral pelvic lymphandectomy;  Surgeon: Alan Sin MD;  Location: [facility]    RHINOPLASTY            Social History:     Social History     Tobacco Use    Smoking status: Never    Smokeless tobacco: Never   Substance Use Topics    Alcohol use: No          Family History:     Family History   Problem Relation Age of Onset    Gastrointestinal Disease Mother         reflux    Early Death Mother     Diabetes Father         type 2    Prostate Cancer Father     Aneurysm Brother     Diabetes Type 2  Brother     Eye Disorder Maternal Grandfather         cateracts    Heart Disease Paternal Grandfather         heart attack age 60's    Melanoma No family hx of     Skin Cancer No family hx of           Allergies:     Allergies   Allergen Reactions    No Known Drug Allergy           Medications:     Current Outpatient Medications   Medication Sig Dispense Refill    aspirin (ASA) 81 MG EC tablet Take 1 tablet (81 mg) by mouth daily      atorvastatin (LIPITOR) 20 MG tablet Take 1 tablet (20 mg) by mouth daily. 90 tablet 3    famotidine (PEPCID) 40 MG tablet Take one pill daily but you can use one pill twice daily if needed. 90 tablet 3    fluocinonide (LIDEX) 0.05 % external cream Apply topically 2 times daily 60 g 3    sildenafil (REVATIO) 20 MG tablet Take 1 tablet (20 mg) by mouth daily as needed (sexual activity) Take 1-5 tabs, 30-60 mins prior to sexual activity. Do not take with nitrates. 30 tablet 11    terbinafine (LAMISIL) 1 % external cream DENILSON AA BID FOR FUNGAL INFECTION  1    triamcinolone (KENALOG) 0.1 % external cream Apply topically 2 times daily 454 g 3     No current facility-administered medications for this visit.          Review of Systems:    ROS: See HPI for pertinent details.  Remainder of 10-point  ROS negative.         Physical Exam:   VS:  T: Data Unavailable    HR: 78    BP: 113/72    RR: Data Unavailable     GEN:  Alert.  NAD.   HEENT:  Sclerae anicteric.    CV:  No obvious jugular venous distension.  LUNGS: No respiratory distress, breathing comfortably wo accessory muscle use.  ABD:  ND.     SKIN:  Dry. No visible rashes.   NEURO:  CN grossly intact.          Laboratory Data:     Lab Results   Component Value Date    PSA <0.04 04/19/2024    PSA <0.04 10/19/2023    PSA <0.04 04/20/2023    PSA <0.04 10/20/2022    PSA <0.04 04/28/2022    PSA <0.04 10/22/2021    PSA 6.24 01/11/2019    PSA 4.52 11/09/2018    PSA 3.57 05/04/2018    PSA 1.12 02/09/2016    PSA 0.66 09/26/2014     Lab Results   Component Value Date    CR 0.96 12/05/2023    CR 0.97 12/09/2022    CR 0.96 06/10/2021    CR 0.84 01/19/2019    CR 0.75 01/18/2019    CR 0.93 05/04/2018     Lab Results   Component Value Date    GFRESTIMATED 89 12/05/2023    GFRESTIMATED 88 12/09/2022    GFRESTIMATED 85 06/10/2021    GFRESTIMATED >90 01/19/2019    GFRESTIMATED >90 01/18/2019    GFRESTIMATED 84 05/04/2018

## 2024-10-23 NOTE — PATIENT INSTRUCTIONS
Go to the website shown below for a database of different pelvic floor exercises you can choose from to perform at home:    [email or web address]

## 2025-03-12 ENCOUNTER — MYC MEDICAL ADVICE (OUTPATIENT)
Dept: FAMILY MEDICINE | Facility: CLINIC | Age: 65
End: 2025-03-12
Payer: COMMERCIAL

## 2025-03-12 NOTE — TELEPHONE ENCOUNTER
See MY CHART  -- YUNIER   Sent pt message to discuss at appt.    9 day lapse in taking Atorvastatin.   Pt ? if delay checking lab when in for Phys on 3-14-25  Last Lipids done Sep 2024     Ivonne BILLS MA

## 2025-03-17 ENCOUNTER — TELEPHONE (OUTPATIENT)
Dept: DERMATOLOGY | Facility: CLINIC | Age: 65
End: 2025-03-17
Payer: COMMERCIAL

## 2025-03-17 ENCOUNTER — MYC REFILL (OUTPATIENT)
Dept: DERMATOLOGY | Facility: CLINIC | Age: 65
End: 2025-03-17
Payer: COMMERCIAL

## 2025-03-17 DIAGNOSIS — R21 RASH AND NONSPECIFIC SKIN ERUPTION: ICD-10-CM

## 2025-03-17 RX ORDER — TRIAMCINOLONE ACETONIDE 1 MG/G
CREAM TOPICAL 2 TIMES DAILY
Qty: 454 G | Refills: 3 | Status: CANCELLED | OUTPATIENT
Start: 2025-03-17

## 2025-03-17 NOTE — TELEPHONE ENCOUNTER
[facility] Call Center    Phone Message    May a detailed message be left on voicemail: yes     Reason for Call: Other: Patient is willing to see  if needed at anytime for his medication refill. Patient is willing to go to any location but there aren't any available times before he leaves town. Please call patient back to discuss. Thanks.      Action Taken: te sent    Travel Screening: Not Applicable     Date of Service:

## 2025-03-20 ENCOUNTER — TELEPHONE (OUTPATIENT)
Dept: ONCOLOGY | Facility: CLINIC | Age: 65
End: 2025-03-20
Payer: COMMERCIAL

## 2025-03-20 NOTE — TELEPHONE ENCOUNTER
[facility] Call Center    Phone Message    May a detailed message be left on voicemail: yes     Reason for Call: Other: Adan called and stated he had a bloody discharge when he urinated, it was only once but he's concerned. Please give him a call back.      Action Taken: Other: [facility]    Travel Screening: Not Applicable     Date of Service:

## 2025-04-08 ENCOUNTER — OFFICE VISIT (OUTPATIENT)
Dept: DERMATOLOGY | Facility: CLINIC | Age: 65
End: 2025-04-08
Payer: COMMERCIAL

## 2025-04-08 DIAGNOSIS — L57.0 AK (ACTINIC KERATOSIS): ICD-10-CM

## 2025-04-08 DIAGNOSIS — D49.2 NEOPLASM OF UNSPECIFIED BEHAVIOR OF BONE, SOFT TISSUE, AND SKIN: Primary | ICD-10-CM

## 2025-04-08 DIAGNOSIS — L82.0 INFLAMED SEBORRHEIC KERATOSIS: ICD-10-CM

## 2025-04-08 PROCEDURE — 17000 DESTRUCT PREMALG LESION: CPT | Mod: XS | Performed by: DERMATOLOGY

## 2025-04-08 PROCEDURE — 1126F AMNT PAIN NOTED NONE PRSNT: CPT | Performed by: DERMATOLOGY

## 2025-04-08 PROCEDURE — 11103 TANGNTL BX SKIN EA SEP/ADDL: CPT | Mod: XS | Performed by: DERMATOLOGY

## 2025-04-08 PROCEDURE — 17110 DESTRUCTION B9 LES UP TO 14: CPT | Performed by: DERMATOLOGY

## 2025-04-08 PROCEDURE — 99203 OFFICE O/P NEW LOW 30 MIN: CPT | Mod: 25 | Performed by: DERMATOLOGY

## 2025-04-08 PROCEDURE — 11104 PUNCH BX SKIN SINGLE LESION: CPT | Mod: XS | Performed by: DERMATOLOGY

## 2025-04-08 PROCEDURE — 88305 TISSUE EXAM BY PATHOLOGIST: CPT | Mod: TC | Performed by: DERMATOLOGY

## 2025-04-08 ASSESSMENT — PAIN SCALES - GENERAL: PAINLEVEL_OUTOF10: NO PAIN (0)

## 2025-04-08 NOTE — PROGRESS NOTES
[facility] Dermatology Note  Encounter Date: Apr 8, 2025  Office Visit     Dermatology Problem List:  1. Dysplastic nevus, L anterior shoulder, s/p excision 11/16/21  2. ISKs  - 4 ISK's removed 4/8/2025, Right cheek ISKs x2, left sideburn ISK x1, left lower eyelid ISK x1  3.. Onychomycosis and history of tinea pedis  4. History of dyshidrotic eczema  5. History of skin cancer  SCCis, right forearm   - s/p shave biopsy 7/19/22  - ED&C 8/16/2022  6. Actinic Keratosis, right forearm 4/8/2025  -Treated with cryo  7. Potential BCC, upper back 4/8/2025  - shave biopsy 4/8/2025  8. Stasis Dermatitis 4/8/2025   ____________________________________________    Assessment & Plan:    # History of nonmelanoma skin cancer, no clincial evidence of recurrence. AK at site  - Scar healing well s/p ED&C, scaly slightly red rough papule seen at procedure site. Considering superficial nature of prior SCC makes recurrence unlikely.  - Cryotherapy  - Sun safety habits reviewed with pt.   - Monitor: Patient to continue monitoring at home and will contact the clinic for any changes. Plan for more consistent follow-up if AK persists despite cryotherapy      # Neoplasm of unspecified behavior of the skin (D49.2) on the central upper back.   - Small arborizing macule found in central upper back.   - Shave biopsy performed and sent to pathology.     # ISK  - 4 ISK's treated with cryotherapy. Right cheek ISKs x2, left sideburn ISK x1, left lower eyelid ISK x1    # Stasis Dermatitis   - Educated pt on etiology, and the benign nature of the condition  - Suggested management techniques such as compression socks and feet elevation    Procedures Performed:   - Shave biopsy procedure note, location(s): Central upper back. After discussion of benefits and risks including but not limited to bleeding, infection, scar, incomplete removal, recurrence, and non-diagnostic biopsy, verbal consent and photographs were obtained. The area was  cleaned with isopropyl alcohol. 0.5mL of 1% lidocaine with epinephrine was injected to obtain adequate anesthesia of lesion(s). Shave biopsy at site(s) performed. Hemostasis was achieved with aluminium chloride. Petrolatum ointment and a sterile dressing were applied. The patient tolerated the procedure and no complications were noted. The patient was provided with verbal and written post care instructions.   - Cryotherapy procedure note, location(s): Right cheek ISKs x2, left sideburn ISK x1, left lower eyelid ISK x1, right forearm AK x1 After verbal consent and discussion of risks and benefits including, but not limited to, dyspigmentation/scar, blister, and pain, 4 benign and 1 pre-malignant lesion(s) was(were) treated with 1-2 mm freeze border for 1-2 cycles with liquid nitrogen. Post cryotherapy instructions were provided.    Follow-up: 1 year, 1 month if AK returns    Staff and Scribe:     Scribe Disclosure:   I, Charu Chaves, am serving as a scribe; to document services personally performed by Ryan Bañuelos MD -based on data collection and the provider's statements to me.     Provider Disclosure:  I agree with above History, Review of Systems, Physical exam and Plan.  I have reviewed the content of the documentation and have edited it as needed. I have personally performed the services documented here and the documentation accurately represents those services and the decisions I have made.      Electronically signed by:    Victor M Caruso. MS3     ***  ____________________________________________    CC: No chief complaint on file.    HPI:  Mr. Brice Trevino is a(n) 64 year old male who presents today as a return patient for FBSE with concerns over new lesions on the right cheek, left sideburn, and left lower eyelid. He's also concerned about SCC recurrence due to increased scale at the former SCC site. Denies any tenderness, itchiness, or bleeding with these lesions.       Patient is otherwise feeling well,  without additional skin concerns.    Labs Reviewed:  N/A    Physical Exam:  Vitals: There were no vitals taken for this visit.  SKIN: Total skin excluding the undergarment areas was performed. The exam included the head/face, neck, both arms, chest, back, abdomen, both legs, digits and/or nails.   - There is an erythematous macule with overyling adherent scale on the ED&C site.  - There are tan to brown waxy stuck on papules with surrounding erythema on the ight cheek, left sideburn, and left lower eyelid.  - Multiple regular brown pigmented macules and papules are identified on the trunk and extremities.   - There are waxy stuck on tan to brown papules on the trunk and extremities.   - No other lesions of concern on areas examined.     Medications:  Current Outpatient Medications   Medication Sig Dispense Refill    aspirin (ASA) 81 MG EC tablet Take 1 tablet (81 mg) by mouth daily      atorvastatin (LIPITOR) 20 MG tablet Take 1 tablet (20 mg) by mouth daily. 90 tablet 3    clobetasol propionate (TEMOVATE) 0.05 % external cream Apply topically 2 times daily. To hands for hand eczema. 60 g 3    famotidine (PEPCID) 40 MG tablet Take one pill daily but you can use one pill twice daily if needed. 90 tablet 3    fluocinonide (LIDEX) 0.05 % external cream Apply topically 2 times daily 60 g 3    sildenafil (REVATIO) 20 MG tablet Take 1 tablet (20 mg) by mouth daily as needed (sexual activity) Take 1-5 tabs, 30-60 mins prior to sexual activity. Do not take with nitrates. 30 tablet 11    terbinafine (LAMISIL) 1 % external cream DENILSON AA BID FOR FUNGAL INFECTION  1    triamcinolone (KENALOG) 0.1 % external cream Apply topically 2 times daily. 454 g 3     No current facility-administered medications for this visit.      Past Medical History:   Patient Active Problem List   Diagnosis    Obesity    Migraine    Prostate cancer (H)    Gastroesophageal reflux disease    Blood glucose elevated    Elevated coronary artery calcium score      Past Medical History:   Diagnosis Date    Blood glucose elevated     Dermatitis     Elevated coronary artery calcium score 11/2018    16.8    Gastroesophageal reflux disease     History of colonoscopy 07/2023    tics only    Inguinal hernia     right very small    Prostate cancer (H) 2019    robotic surgery done 1/19 by Dr. Sin, slight rise psa 10/20 had xrt and hormone therapy        CC No referring provider defined for this encounter. on close of this encounter.

## 2025-04-08 NOTE — NURSING NOTE
Dermatology Rooming Note    Brice Trevino's goals for this visit include:   Chief Complaint   Patient presents with    Skin Check     Adan is here today for a skin check; Spot of concern on left cheek, under left eye, and right side of face. He is also wondering about discoloration on lower legs.     Simona Simpson, Visit Facilitator

## 2025-04-08 NOTE — PATIENT INSTRUCTIONS
Numbing from shave biopsy will last 3-5 hours. Take tylenol as needed for any pain.     .um    Cryotherapy    What is it?  Use of a very cold liquid, such as liquid nitrogen, to freeze and destroy abnormal skin cells that need to be removed    What should I expect?  Tenderness and redness  A small blister that might grow and fill with dark purple blood. There may be crusting.  More than one treatment may be needed if the lesions do not go away.    How do I care for the treated area?  Gently wash the area with your hands when bathing.  Use a thin layer of Vaseline to help with healing. You may use a Band-Aid.   The area should heal within 7-10 days and may leave behind a pink or lighter color.   Do not use an antibiotic or Neosporin ointment.   You may take acetaminophen (Tylenol) for pain.     Call your doctor if you have:  Severe pain  Signs of infection (warmth, redness, cloudy yellow drainage, and or a bad smell)  Questions or concerns    Who should I call with questions?      [facility]: [phone]      [facility]: [phone]      For urgent needs outside of business hours call the [facility] at [phone] and ask for the dermatology resident on call

## 2025-04-08 NOTE — PROGRESS NOTES
Lidocaine-epinephrine 1-1:308701 % injection   1mL once for one use, starting 4/8/2025 ending 4/8/2025,  2mL disp, R-0, injection  Injected by SHOBHA Sears

## 2025-04-08 NOTE — LETTER
4/8/2025       RE: Brice Trevino  [address]  [place] [address]     Dear Colleague,    Thank you for referring your patient, Brice Trevino, to the [facility] [place] at [facility]. Please see a copy of my visit note below.    [facility] Dermatology Note  Encounter Date: Apr 8, 2025  Office Visit     Dermatology Problem List:  1. Dysplastic nevus, L anterior shoulder, s/p excision 11/16/21  2. ISKs  - 4 ISK's removed 4/8/2025, Right cheek ISKs x2, left sideburn ISK x1, left lower eyelid ISK x1  3.. Onychomycosis and history of tinea pedis  4. History of dyshidrotic eczema  5. History of skin cancer  SCCis, right forearm   - s/p shave biopsy 7/19/22  - ED&C 8/16/2022  6. Actinic Keratosis, right forearm 4/8/2025  -Treated with cryo  7. Potential BCC, upper back 4/8/2025  - shave biopsy 4/8/2025  8. Stasis Dermatitis 4/8/2025   ____________________________________________    Assessment & Plan:    # History of nonmelanoma skin cancer, no clincial evidence of recurrence. AK at site  - Scar healing well s/p ED&C, scaly slightly red rough papule seen at procedure site. Considering superficial nature of prior SCC makes recurrence unlikely.  - Cryotherapy  - Sun safety habits reviewed with pt.   - Monitor: Patient to continue monitoring at home and will contact the clinic for any changes. Plan for more consistent follow-up if AK persists despite cryotherapy      # Neoplasm of unspecified behavior of the skin (D49.2) on the central upper back.   - Small arborizing macule found in central upper back.   - Shave biopsy performed and sent to pathology.     # ISK  - 4 ISK's treated with cryotherapy. Right cheek ISKs x2, left sideburn ISK x1, left lower eyelid ISK x1    # Actinic keratosis  - 1 R forearm, LN2 today    # Stasis Dermatitis   - Educated pt on etiology, and the benign nature of the condition  -  Suggested management techniques such as compression socks and feet elevation    Procedures Performed:   - Shave biopsy procedure note, location(s): Central upper back. After discussion of benefits and risks including but not limited to bleeding, infection, scar, incomplete removal, recurrence, and non-diagnostic biopsy, verbal consent and photographs were obtained. The area was cleaned with isopropyl alcohol. 0.5mL of 1% lidocaine with epinephrine was injected to obtain adequate anesthesia of lesion(s). Shave biopsy at site(s) performed. Hemostasis was achieved with aluminium chloride. Petrolatum ointment and a sterile dressing were applied. The patient tolerated the procedure and no complications were noted. The patient was provided with verbal and written post care instructions.   - Cryotherapy procedure note, location(s): Right cheek ISKs x2, left sideburn ISK x1, left lower eyelid ISK x1, right forearm AK x1 After verbal consent and discussion of risks and benefits including, but not limited to, dyspigmentation/scar, blister, and pain, 4 benign and 1 pre-malignant lesion(s) was(were) treated with 1-2 mm freeze border for 1-2 cycles with liquid nitrogen. Post cryotherapy instructions were provided.    Follow-up: 1 year, 1 month if AK returns    Staff and Scribe:     Scribe Disclosure:   I, Charu Chaves, am serving as a scribe; to document services personally performed by Ryan Bañuelos MD -based on data collection and the provider's statements to me.     Provider Disclosure:  I agree with above History, Review of Systems, Physical exam and Plan.  I have reviewed the content of the documentation and have edited it as needed. I have personally performed the services documented here and the documentation accurately represents those services and the decisions I have made.      Electronically signed by:    Victor M Caruso. MS3     Staff Physician:  I was present with the medical student who participated in the service  and in the documentation of the note. I have verified the history and personally performed the physical exam and medical decision making. I agree with the assessment and plan of care as documented in the note.     Ryan Bañuelos MD  Staff Dermatologist and Dermatopathologist  , Department of Dermatology   ____________________________________________    CC: Skin Check (Adan is here today for a skin check; Spots of concern on face and left forearm. He is also wondering about discoloration on lower legs.)    HPI:  Mr. Brice Trevino is a(n) 64 year old male who presents today as a return patient for FBSE with concerns over new lesions on the right cheek, left sideburn, and left lower eyelid. He's also concerned about SCC recurrence due to increased scale at the former SCC site. Denies any tenderness, itchiness, or bleeding with these lesions.       Patient is otherwise feeling well, without additional skin concerns.    Labs Reviewed:  N/A    Physical Exam:  Vitals: There were no vitals taken for this visit.  SKIN: Total skin excluding the undergarment areas was performed. The exam included the head/face, neck, both arms, chest, back, abdomen, both legs, digits and/or nails.   - There is an erythematous macule with overyling adherent scale on the ED&C site.  - There are tan to brown waxy stuck on papules with surrounding erythema on the ight cheek, left sideburn, and left lower eyelid.  - Multiple regular brown pigmented macules and papules are identified on the trunk and extremities.   - There are waxy stuck on tan to brown papules on the trunk and extremities.   - No other lesions of concern on areas examined.     Medications:  Current Outpatient Medications   Medication Sig Dispense Refill     aspirin (ASA) 81 MG EC tablet Take 1 tablet (81 mg) by mouth daily       atorvastatin (LIPITOR) 20 MG tablet Take 1 tablet (20 mg) by mouth daily. 90 tablet 3     clobetasol propionate (TEMOVATE) 0.05 %  external cream Apply topically 2 times daily. To hands for hand eczema. 60 g 3     famotidine (PEPCID) 40 MG tablet Take one pill daily but you can use one pill twice daily if needed. 90 tablet 3     fluocinonide (LIDEX) 0.05 % external cream Apply topically 2 times daily 60 g 3     sildenafil (REVATIO) 20 MG tablet Take 1 tablet (20 mg) by mouth daily as needed (sexual activity) Take 1-5 tabs, 30-60 mins prior to sexual activity. Do not take with nitrates. 30 tablet 11     terbinafine (LAMISIL) 1 % external cream DENILSON AA BID FOR FUNGAL INFECTION  1     triamcinolone (KENALOG) 0.1 % external cream Apply topically 2 times daily. 454 g 3     sildenafil (VIAGRA) 25 MG tablet Take 1-4 tablets ( mg) by mouth daily as needed (ED). 30 tablet 11     No current facility-administered medications for this visit.      Past Medical History:   Patient Active Problem List   Diagnosis     Obesity     Migraine     Prostate cancer (H)     Gastroesophageal reflux disease     Blood glucose elevated     Elevated coronary artery calcium score     Past Medical History:   Diagnosis Date     Blood glucose elevated      Dermatitis      Elevated coronary artery calcium score 11/2018    16.8     Gastroesophageal reflux disease      History of colonoscopy 07/2023    tics only     Inguinal hernia     right very small     Prostate cancer (H) 2019    robotic surgery done 1/19 by Dr. Sin, slight rise psa 10/20 had xrt and hormone therapy        CC No referring provider defined for this encounter. on close of this encounter.     Lidocaine-epinephrine 1-1:290512 % injection   1mL once for one use, starting 4/8/2025 ending 4/8/2025,  2mL disp, R-0, injection  Injected by SHOBHA Sears      Again, thank you for allowing me to participate in the care of your patient.      Sincerely,    Ryan Bañuelos MD

## 2025-04-16 ENCOUNTER — LAB (OUTPATIENT)
Dept: LAB | Facility: CLINIC | Age: 65
End: 2025-04-16
Payer: COMMERCIAL

## 2025-04-16 DIAGNOSIS — Z00.00 ENCOUNTER FOR ANNUAL PHYSICAL EXAM: ICD-10-CM

## 2025-04-16 DIAGNOSIS — R73.9 BLOOD GLUCOSE ELEVATED: ICD-10-CM

## 2025-04-16 DIAGNOSIS — R93.1 ELEVATED CORONARY ARTERY CALCIUM SCORE: ICD-10-CM

## 2025-04-16 LAB
ALBUMIN SERPL BCG-MCNC: 4.5 G/DL (ref 3.5–5.2)
ALP SERPL-CCNC: 68 U/L (ref 40–150)
ALT SERPL W P-5'-P-CCNC: 31 U/L (ref 0–70)
ANION GAP SERPL CALCULATED.3IONS-SCNC: 9 MMOL/L (ref 7–15)
AST SERPL W P-5'-P-CCNC: 27 U/L (ref 0–45)
BILIRUB SERPL-MCNC: 0.4 MG/DL
BUN SERPL-MCNC: 20 MG/DL (ref 8–23)
CALCIUM SERPL-MCNC: 9.8 MG/DL (ref 8.8–10.4)
CHLORIDE SERPL-SCNC: 104 MMOL/L (ref 98–107)
CHOLEST SERPL-MCNC: 168 MG/DL
CREAT SERPL-MCNC: 0.97 MG/DL (ref 0.67–1.17)
CREAT UR-MCNC: 161 MG/DL
EGFRCR SERPLBLD CKD-EPI 2021: 87 ML/MIN/1.73M2
ERYTHROCYTE [DISTWIDTH] IN BLOOD BY AUTOMATED COUNT: 12.7 % (ref 10–15)
EST. AVERAGE GLUCOSE BLD GHB EST-MCNC: 140 MG/DL
FASTING STATUS PATIENT QL REPORTED: YES
FASTING STATUS PATIENT QL REPORTED: YES
GLUCOSE SERPL-MCNC: 127 MG/DL (ref 70–99)
HBA1C MFR BLD: 6.5 % (ref 0–5.6)
HCO3 SERPL-SCNC: 27 MMOL/L (ref 22–29)
HCT VFR BLD AUTO: 42.1 % (ref 40–53)
HDLC SERPL-MCNC: 48 MG/DL
HGB BLD-MCNC: 13.9 G/DL (ref 13.3–17.7)
LDLC SERPL CALC-MCNC: 102 MG/DL
MCH RBC QN AUTO: 31.2 PG (ref 26.5–33)
MCHC RBC AUTO-ENTMCNC: 33 G/DL (ref 31.5–36.5)
MCV RBC AUTO: 95 FL (ref 78–100)
MICROALBUMIN UR-MCNC: <12 MG/L
MICROALBUMIN/CREAT UR: NORMAL MG/G{CREAT}
NONHDLC SERPL-MCNC: 120 MG/DL
PLATELET # BLD AUTO: 184 10E3/UL (ref 150–450)
POTASSIUM SERPL-SCNC: 4.1 MMOL/L (ref 3.4–5.3)
PROT SERPL-MCNC: 6.8 G/DL (ref 6.4–8.3)
RBC # BLD AUTO: 4.45 10E6/UL (ref 4.4–5.9)
SODIUM SERPL-SCNC: 140 MMOL/L (ref 135–145)
TRIGL SERPL-MCNC: 88 MG/DL
WBC # BLD AUTO: 5.2 10E3/UL (ref 4–11)

## 2025-04-16 PROCEDURE — 80053 COMPREHEN METABOLIC PANEL: CPT

## 2025-04-16 PROCEDURE — 36415 COLL VENOUS BLD VENIPUNCTURE: CPT

## 2025-04-16 PROCEDURE — 82043 UR ALBUMIN QUANTITATIVE: CPT

## 2025-04-16 PROCEDURE — 85027 COMPLETE CBC AUTOMATED: CPT

## 2025-04-16 PROCEDURE — 82570 ASSAY OF URINE CREATININE: CPT

## 2025-04-16 PROCEDURE — 80061 LIPID PANEL: CPT

## 2025-04-16 PROCEDURE — 83036 HEMOGLOBIN GLYCOSYLATED A1C: CPT

## 2025-04-16 NOTE — RESULT ENCOUNTER NOTE
So wanted to get back to you with your test results.    Your CBC or blood count is normal with no signs of anemia or blood disorders.  Your diabetes test or hemoglobin A1c is stable at 6.5.  Your blood salts, kidney test, liver tests, proteins, and urine study are all normal.    Your total cholesterol is 168.  Your HDL or good cholesterol is very good but the LDL or bad is slightly higher.  Please continue to take your cholesterol medication and watch her diet.    Overall the tests look fine.  If you have questions let me know    Sai Benedict M.D.

## 2025-04-18 ENCOUNTER — MYC MEDICAL ADVICE (OUTPATIENT)
Dept: FAMILY MEDICINE | Facility: CLINIC | Age: 65
End: 2025-04-18

## 2025-04-24 ENCOUNTER — OFFICE VISIT (OUTPATIENT)
Dept: UROLOGY | Facility: CLINIC | Age: 65
End: 2025-04-24
Payer: COMMERCIAL

## 2025-04-24 VITALS
BODY MASS INDEX: 29.77 KG/M2 | OXYGEN SATURATION: 96 % | HEART RATE: 85 BPM | WEIGHT: 201 LBS | HEIGHT: 69 IN | DIASTOLIC BLOOD PRESSURE: 78 MMHG | SYSTOLIC BLOOD PRESSURE: 120 MMHG

## 2025-04-24 DIAGNOSIS — C61 PROSTATE CANCER (H): Primary | ICD-10-CM

## 2025-04-24 DIAGNOSIS — N52.9 VASCULOGENIC ERECTILE DYSFUNCTION, UNSPECIFIED VASCULOGENIC ERECTILE DYSFUNCTION TYPE: ICD-10-CM

## 2025-04-24 LAB — PSA SERPL-MCNC: <0.04 UG/L (ref 0–4)

## 2025-04-24 RX ORDER — SILDENAFIL 25 MG/1
25-100 TABLET, FILM COATED ORAL DAILY PRN
Qty: 30 TABLET | Refills: 11 | Status: SHIPPED | OUTPATIENT
Start: 2025-04-24

## 2025-04-24 NOTE — LETTER
4/24/2025       RE: Brice Trevino  [address]  [place] [address]     Dear Colleague,    Thank you for referring your patient, Brice Trevino, to the [facility] [place] at [facility]. Please see a copy of my visit note below.      Urology Outpatient Visit    Name: Brice Trevino    MRN: [id number]   YOB: 1960               Chief Complaint:   Hx Prostate Cancer          Impression and Plan:   Impression / Plan:   Brice Trevino is a 64 year old male with Hx Pr Ca [place] 5+4, pT3b w SVI & EPE s/p RALP 2019. Here for PSA monitoring & ED.       The AUA/LUCI guidelines recommend monitoring PSA levels every 6 months from years 2-5, and annually thereafter if PSA remains undetectable. Since he finished salvage radiation 1/2021, we will check his PSA once more in October and if it remains undetectable it would be reasonable to step out to annual PSAs at that time.     Viagra refilled.    Kegel exercises as discussed.     Thank you for the opportunity to participate in the care of Brice Trevino.     LENARD Webb, CNP   [facility] - Department of Urology  [phone]          History of Present Illness:   Brice Trevino is a 64 year old male with history of Prostate Cancer.  RALP completed 1/18/2019. [place] 5+4, pT3b with negative lymph nodes. Negative margins. SVI and EPE present. Intraductal carcinoma noted. PSA bety to 0.08 10/23/20 and given high-risk features at prostatectomy he was given short-course of hormones in 2020 and completed salvage radiotherapy 1/2021. PSA undetectable today. Has been having good success with sildenafil. He has no complaints today. He plans to more regularly implement kegels to help with leakage.     History is obtained from patient & EMR          Past Medical History:     Past Medical History:   Diagnosis Date     Blood glucose elevated      Dermatitis      Elevated  coronary artery calcium score 11/2018    16.8     Gastroesophageal reflux disease      History of colonoscopy 07/2023    tics only     Inguinal hernia     right very small     Prostate cancer (H) 2019    robotic surgery done 1/19 by Dr. Sin, slight rise psa 10/20 had xrt and hormone therapy          Past Surgical History:     Past Surgical History:   Procedure Laterality Date     BIOPSY  12/03/2018    Biopsy of Prostate     COLONOSCOPY  04/19/2013    Procedure: COLONOSCOPY;  Colonoscopy;  Surgeon: Yovany Alcaraz MD;  Location:  GI     COLONOSCOPY N/A 07/27/2023    Procedure: Colonoscopy;  Surgeon: Cathi Robles MD;  Location: [facility]     DAVINCI PROSTATECTOMY, LYMPHADENECTOMY Bilateral 01/18/2019    Procedure: robotic assisted laparoscopic radical prostatectomy bilateral pelvic lymphandectomy;  Surgeon: Alan Sin MD;  Location:  OR     GENITOURINARY SURGERY  1/18/2019    Prostatectomy     RHINOPLASTY            Social History:     Social History     Tobacco Use     Smoking status: Never     Smokeless tobacco: Never   Substance Use Topics     Alcohol use: No          Family History:     Family History   Problem Relation Age of Onset     Gastrointestinal Disease Mother         reflux     Early Death Mother      Diabetes Father         type 2     Prostate Cancer Father      Aneurysm Brother      Diabetes Type 2  Brother      Eye Disorder Maternal Grandfather         cateracts     Heart Disease Paternal Grandfather         heart attack age 60's     Melanoma No family hx of      Skin Cancer No family hx of           Allergies:     Allergies   Allergen Reactions     No Known Drug Allergy           Medications:     Current Outpatient Medications   Medication Sig Dispense Refill     aspirin (ASA) 81 MG EC tablet Take 1 tablet (81 mg) by mouth daily       atorvastatin (LIPITOR) 20 MG tablet Take 1 tablet (20 mg) by mouth daily. 90 tablet 3     clobetasol propionate (TEMOVATE) 0.05 %  external cream Apply topically 2 times daily. To hands for hand eczema. 60 g 3     famotidine (PEPCID) 40 MG tablet Take one pill daily but you can use one pill twice daily if needed. 90 tablet 3     fluocinonide (LIDEX) 0.05 % external cream Apply topically 2 times daily 60 g 3     sildenafil (REVATIO) 20 MG tablet Take 1 tablet (20 mg) by mouth daily as needed (sexual activity) Take 1-5 tabs, 30-60 mins prior to sexual activity. Do not take with nitrates. 30 tablet 11     terbinafine (LAMISIL) 1 % external cream DENILSON AA BID FOR FUNGAL INFECTION  1     triamcinolone (KENALOG) 0.1 % external cream Apply topically 2 times daily. 454 g 3     No current facility-administered medications for this visit.          Review of Systems:    ROS: See HPI for pertinent details.  Remainder of 10-point ROS negative.         Physical Exam:   VS:  T: Data Unavailable    HR: 85    BP: 120/78    RR: Data Unavailable     GEN:  Alert.  NAD.   HEENT:  Sclerae anicteric.    CV:  No obvious jugular venous distension.  LUNGS: No respiratory distress, breathing comfortably wo accessory muscle use.  ABD:  ND.     SKIN:  Dry. No visible rashes.   NEURO:  CN grossly intact.          Laboratory Data:     Lab Results   Component Value Date    PSA <0.04 10/23/2024    PSA <0.04 04/19/2024    PSA <0.04 10/19/2023    PSA <0.04 04/20/2023    PSA <0.04 10/20/2022    PSA <0.04 04/28/2022    PSA <0.04 10/22/2021    PSA 6.24 01/11/2019    PSA 4.52 11/09/2018    PSA 3.57 05/04/2018    PSA 1.12 02/09/2016    PSA 0.66 09/26/2014     Lab Results   Component Value Date    CR 0.97 04/16/2025    CR 0.96 12/05/2023    CR 0.97 12/09/2022    CR 0.96 06/10/2021    CR 0.84 01/19/2019    CR 0.75 01/18/2019    CR 0.93 05/04/2018     Lab Results   Component Value Date    GFRESTIMATED 87 04/16/2025    GFRESTIMATED 89 12/05/2023    GFRESTIMATED 88 12/09/2022    GFRESTIMATED 85 06/10/2021    GFRESTIMATED >90 01/19/2019    GFRESTIMATED >90 01/18/2019    GFRESTIMATED 84  05/04/2018            Again, thank you for allowing me to participate in the care of your patient.      Sincerely,    LENARD Ragland CNP

## 2025-04-24 NOTE — PROGRESS NOTES
Urology Outpatient Visit    Name: Brice Trevino    MRN: [id number]   YOB: 1960               Chief Complaint:   Hx Prostate Cancer          Impression and Plan:   Impression / Plan:   Brice Trevino is a 64 year old male with Hx Pr Ca Kimberly 5+4, pT3b w SVI & EPE s/p RALP 2019. Here for PSA monitoring & ED.       The AUA/LUCI guidelines recommend monitoring PSA levels every 6 months from years 2-5, and annually thereafter if PSA remains undetectable. Since he finished salvage radiation 1/2021, we will check his PSA once more in October and if it remains undetectable it would be reasonable to step out to annual PSAs at that time.     Viagra refilled.    Kegel exercises as discussed.     Thank you for the opportunity to participate in the care of Brice Trevino.     LENARD eWbb, CNP  [facility] - Department of Urology  [phone]          History of Present Illness:   Brice Trevino is a 64 year old male with history of Prostate Cancer.  RALP completed 1/18/2019. Kimberly 5+4, pT3b with negative lymph nodes. Negative margins. SVI and EPE present. Intraductal carcinoma noted. PSA bety to 0.08 10/23/20 and given high-risk features at prostatectomy he was given short-course of hormones in 2020 and completed salvage radiotherapy 1/2021. PSA undetectable today. Has been having good success with sildenafil. He has no complaints today. He plans to more regularly implement kegels to help with leakage.     History is obtained from patient & EMR          Past Medical History:     Past Medical History:   Diagnosis Date    Blood glucose elevated     Dermatitis     Elevated coronary artery calcium score 11/2018    16.8    Gastroesophageal reflux disease     History of colonoscopy 07/2023    tics only    Inguinal hernia     right very small    Prostate cancer (H) 2019    robotic surgery done 1/19 by Dr. Sin, slight rise psa 10/20 had xrt and hormone therapy          Past Surgical History:      Past Surgical History:   Procedure Laterality Date    BIOPSY  12/03/2018    Biopsy of Prostate    COLONOSCOPY  04/19/2013    Procedure: COLONOSCOPY;  Colonoscopy;  Surgeon: Yovany Alcaraz MD;  Location:  GI    COLONOSCOPY N/A 07/27/2023    Procedure: Colonoscopy;  Surgeon: Cathi Robles MD;  Location:  GI    DAVINCI PROSTATECTOMY, LYMPHADENECTOMY Bilateral 01/18/2019    Procedure: robotic assisted laparoscopic radical prostatectomy bilateral pelvic lymphandectomy;  Surgeon: Alan Sin MD;  Location:  OR    GENITOURINARY SURGERY  1/18/2019    Prostatectomy    RHINOPLASTY            Social History:     Social History     Tobacco Use    Smoking status: Never    Smokeless tobacco: Never   Substance Use Topics    Alcohol use: No          Family History:     Family History   Problem Relation Age of Onset    Gastrointestinal Disease Mother         reflux    Early Death Mother     Diabetes Father         type 2    Prostate Cancer Father     Aneurysm Brother     Diabetes Type 2  Brother     Eye Disorder Maternal Grandfather         cateracts    Heart Disease Paternal Grandfather         heart attack age 60's    Melanoma No family hx of     Skin Cancer No family hx of           Allergies:     Allergies   Allergen Reactions    No Known Drug Allergy           Medications:     Current Outpatient Medications   Medication Sig Dispense Refill    aspirin (ASA) 81 MG EC tablet Take 1 tablet (81 mg) by mouth daily      atorvastatin (LIPITOR) 20 MG tablet Take 1 tablet (20 mg) by mouth daily. 90 tablet 3    clobetasol propionate (TEMOVATE) 0.05 % external cream Apply topically 2 times daily. To hands for hand eczema. 60 g 3    famotidine (PEPCID) 40 MG tablet Take one pill daily but you can use one pill twice daily if needed. 90 tablet 3    fluocinonide (LIDEX) 0.05 % external cream Apply topically 2 times daily 60 g 3    sildenafil (REVATIO) 20 MG tablet Take 1 tablet (20 mg) by mouth daily as  needed (sexual activity) Take 1-5 tabs, 30-60 mins prior to sexual activity. Do not take with nitrates. 30 tablet 11    terbinafine (LAMISIL) 1 % external cream DENILSON AA BID FOR FUNGAL INFECTION  1    triamcinolone (KENALOG) 0.1 % external cream Apply topically 2 times daily. 454 g 3     No current facility-administered medications for this visit.          Review of Systems:    ROS: See HPI for pertinent details.  Remainder of 10-point ROS negative.         Physical Exam:   VS:  T: Data Unavailable    HR: 85    BP: 120/78    RR: Data Unavailable     GEN:  Alert.  NAD.   HEENT:  Sclerae anicteric.    CV:  No obvious jugular venous distension.  LUNGS: No respiratory distress, breathing comfortably wo accessory muscle use.  ABD:  ND.     SKIN:  Dry. No visible rashes.   NEURO:  CN grossly intact.          Laboratory Data:     Lab Results   Component Value Date    PSA <0.04 10/23/2024    PSA <0.04 04/19/2024    PSA <0.04 10/19/2023    PSA <0.04 04/20/2023    PSA <0.04 10/20/2022    PSA <0.04 04/28/2022    PSA <0.04 10/22/2021    PSA 6.24 01/11/2019    PSA 4.52 11/09/2018    PSA 3.57 05/04/2018    PSA 1.12 02/09/2016    PSA 0.66 09/26/2014     Lab Results   Component Value Date    CR 0.97 04/16/2025    CR 0.96 12/05/2023    CR 0.97 12/09/2022    CR 0.96 06/10/2021    CR 0.84 01/19/2019    CR 0.75 01/18/2019    CR 0.93 05/04/2018     Lab Results   Component Value Date    GFRESTIMATED 87 04/16/2025    GFRESTIMATED 89 12/05/2023    GFRESTIMATED 88 12/09/2022    GFRESTIMATED 85 06/10/2021    GFRESTIMATED >90 01/19/2019    GFRESTIMATED >90 01/18/2019    GFRESTIMATED 84 05/04/2018

## 2025-04-24 NOTE — NURSING NOTE
Chief Complaint   Patient presents with    Hx of Prostate cancer      Patient here today for SD PSA      Patient had blood drawn today   Patient state he doing well      [place] [employer], A

## (undated) DEVICE — Device

## (undated) DEVICE — BLADE CLIPPER 3M 9670

## (undated) DEVICE — SU DERMABOND ADVANCED .7ML DNX12

## (undated) DEVICE — DAVINCI HOT SHEARS TIP COVER  400180

## (undated) DEVICE — SUCTION IRR STRYKERFLOW II W/TIP 250-070-520

## (undated) DEVICE — TAPE CLOTH ADHESIVE 3" LATEX FREE

## (undated) DEVICE — LINEN ORTHO ACL PACK 5447

## (undated) DEVICE — KIT PATIENT POSITIONING PIGAZZI LATEX FREE 40580

## (undated) DEVICE — ESU GROUND PAD ADULT W/CORD E7507

## (undated) DEVICE — SOL WATER IRRIG 1000ML BOTTLE 07139-09

## (undated) DEVICE — NDL INSUFFLATION 13GA 120MM C2201

## (undated) DEVICE — GLOVE PROTEXIS BLUE W/NEU-THERA 8.0  2D73EB80

## (undated) DEVICE — CLIP ENDO HEMO-LOC PURPLE LG 544240

## (undated) DEVICE — SUCTION MANIFOLD NEPTUNE 2 SYS 4 PORT 0702-020-000

## (undated) DEVICE — SU WND CLOSURE V-LOC 3-0 CV-23 6" VLOCM1904

## (undated) DEVICE — SOL NACL 0.9% INJ 1000ML BAG 2B1324X

## (undated) DEVICE — ENDO TROCAR FIRST ENTRY KII FIOS Z-THRD 12X100MM CTF73

## (undated) DEVICE — DRAIN BLAKE 19FR W/TROCAR SIL

## (undated) DEVICE — DRSG TELFA 2X3"

## (undated) DEVICE — SU VICRYL 0 UR-6 27" J603H

## (undated) DEVICE — GLOVE PROTEXIS W/NEU-THERA 7.5  2D73TE75

## (undated) DEVICE — PACK DAVINCI UROLOGY SBA15UDFSG

## (undated) DEVICE — GLOVE PROTEXIS BLUE W/NEU-THERA 6.5  2D73EB65

## (undated) DEVICE — CATH FOLEY 20FR 5ML LUBRICATH LATEX 0165L20

## (undated) DEVICE — ENDO SHEARS EXTRA LONG 5MM 174601

## (undated) DEVICE — SU MONOCRYL 4-0 PS-2 27" UND Y426H

## (undated) DEVICE — SU VICRYL 0 CT-1 27" J340H

## (undated) DEVICE — TUBING CONMED AIRSEAL SMOKE EVAC INSUFFLATION ASM-EVAC

## (undated) DEVICE — DRSG GAUZE 4X4" 8044

## (undated) DEVICE — ENDO TROCAR CONMED AIRSEAL BLADELESS 12X120MM IAS12-120LP

## (undated) DEVICE — DAVINCI DRAPE KIT 4-ARM 420291

## (undated) DEVICE — SPONGE RAY-TEC 4X8" 7318

## (undated) DEVICE — ESU CORD BIPOLAR GREEN 10-4000

## (undated) DEVICE — ESU CORD MONOPOLAR 10'  E0510

## (undated) DEVICE — DRAIN JACKSON PRATT RESERVOIR 100ML SU130-1305

## (undated) DEVICE — PROTECTOR ARM ONE-STEP TRENDELENBURG 40418

## (undated) DEVICE — SU SILK 2-0 PSL 18" 673H

## (undated) RX ORDER — ONDANSETRON 2 MG/ML
INJECTION INTRAMUSCULAR; INTRAVENOUS
Status: DISPENSED
Start: 2023-07-27

## (undated) RX ORDER — BUPIVACAINE HYDROCHLORIDE 2.5 MG/ML
INJECTION, SOLUTION EPIDURAL; INFILTRATION; INTRACAUDAL
Status: DISPENSED
Start: 2019-01-18

## (undated) RX ORDER — PROMETHAZINE HYDROCHLORIDE 25 MG/ML
INJECTION, SOLUTION INTRAMUSCULAR; INTRAVENOUS
Status: DISPENSED
Start: 2019-01-18

## (undated) RX ORDER — FENTANYL CITRATE 50 UG/ML
INJECTION, SOLUTION INTRAMUSCULAR; INTRAVENOUS
Status: DISPENSED
Start: 2019-01-18

## (undated) RX ORDER — CEFAZOLIN SODIUM 2 G/100ML
INJECTION, SOLUTION INTRAVENOUS
Status: DISPENSED
Start: 2019-01-18

## (undated) RX ORDER — FENTANYL CITRATE 50 UG/ML
INJECTION, SOLUTION INTRAMUSCULAR; INTRAVENOUS
Status: DISPENSED
Start: 2023-07-27

## (undated) RX ORDER — CEFAZOLIN SODIUM 1 G/3ML
INJECTION, POWDER, FOR SOLUTION INTRAMUSCULAR; INTRAVENOUS
Status: DISPENSED
Start: 2019-01-18

## (undated) RX ORDER — HEPARIN SODIUM 5000 [USP'U]/.5ML
INJECTION, SOLUTION INTRAVENOUS; SUBCUTANEOUS
Status: DISPENSED
Start: 2019-01-18

## (undated) RX ORDER — EPHEDRINE SULFATE 50 MG/ML
INJECTION, SOLUTION INTRAVENOUS
Status: DISPENSED
Start: 2019-01-18

## (undated) RX ORDER — KETAMINE HCL IN 0.9 % NACL 50 MG/5 ML
SYRINGE (ML) INTRAVENOUS
Status: DISPENSED
Start: 2019-01-18

## (undated) RX ORDER — ONDANSETRON 2 MG/ML
INJECTION INTRAMUSCULAR; INTRAVENOUS
Status: DISPENSED
Start: 2019-01-18

## (undated) RX ORDER — GENTAMICIN 40 MG/ML
INJECTION, SOLUTION INTRAMUSCULAR; INTRAVENOUS
Status: DISPENSED
Start: 2018-12-04

## (undated) RX ORDER — LIDOCAINE HYDROCHLORIDE 10 MG/ML
INJECTION, SOLUTION EPIDURAL; INFILTRATION; INTRACAUDAL; PERINEURAL
Status: DISPENSED
Start: 2018-12-04